# Patient Record
Sex: FEMALE | Race: WHITE | URBAN - NONMETROPOLITAN AREA
[De-identification: names, ages, dates, MRNs, and addresses within clinical notes are randomized per-mention and may not be internally consistent; named-entity substitution may affect disease eponyms.]

---

## 2024-04-11 ENCOUNTER — HOSPITAL ENCOUNTER (OUTPATIENT)
Dept: GENERAL RADIOLOGY | Age: 49
Discharge: HOME | End: 2024-04-11
Payer: COMMERCIAL

## 2024-04-11 ENCOUNTER — HOSPITAL ENCOUNTER (OUTPATIENT)
Age: 49
Discharge: HOME | End: 2024-04-11
Payer: COMMERCIAL

## 2024-04-11 DIAGNOSIS — R52 PAIN: ICD-10-CM

## 2024-04-11 PROCEDURE — 72100 X-RAY EXAM L-S SPINE 2/3 VWS: CPT

## 2024-06-30 ENCOUNTER — APPOINTMENT (OUTPATIENT)
Dept: CT IMAGING | Age: 49
DRG: 030 | End: 2024-06-30
Payer: COMMERCIAL

## 2024-06-30 ENCOUNTER — ANESTHESIA EVENT (OUTPATIENT)
Age: 49
End: 2024-06-30
Payer: COMMERCIAL

## 2024-06-30 ENCOUNTER — HOSPITAL ENCOUNTER (INPATIENT)
Age: 49
LOS: 14 days | Discharge: HOME | DRG: 030 | End: 2024-07-14
Attending: EMERGENCY MEDICINE | Admitting: INTERNAL MEDICINE
Payer: COMMERCIAL

## 2024-06-30 ENCOUNTER — APPOINTMENT (OUTPATIENT)
Age: 49
DRG: 030 | End: 2024-06-30
Attending: PSYCHIATRY & NEUROLOGY
Payer: COMMERCIAL

## 2024-06-30 ENCOUNTER — ANESTHESIA (OUTPATIENT)
Age: 49
End: 2024-06-30
Payer: COMMERCIAL

## 2024-06-30 DIAGNOSIS — I60.9 SAH (SUBARACHNOID HEMORRHAGE) (HCC): Primary | ICD-10-CM

## 2024-06-30 LAB
ABO: NORMAL
ALBUMIN SERPL BCG-MCNC: 4.1 G/DL (ref 3.5–5.1)
ALP SERPL-CCNC: 102 U/L (ref 38–126)
ALT SERPL W/O P-5'-P-CCNC: 15 U/L (ref 11–66)
AMPHETAMINES UR QL SCN: NEGATIVE
ANION GAP SERPL CALC-SCNC: 15 MEQ/L (ref 8–16)
ANTIBODY SCREEN: NORMAL
APTT PPP: 27.6 SECONDS (ref 22–38)
AST SERPL-CCNC: 24 U/L (ref 5–40)
B-HCG SERPL QL: NEGATIVE
BACTERIA URNS QL MICRO: ABNORMAL /HPF
BARBITURATES UR QL SCN: NEGATIVE
BASOPHILS ABSOLUTE: 0.1 THOU/MM3 (ref 0–0.1)
BASOPHILS NFR BLD AUTO: 0.4 %
BENZODIAZ UR QL SCN: NEGATIVE
BILIRUB SERPL-MCNC: 0.2 MG/DL (ref 0.3–1.2)
BILIRUB UR QL STRIP.AUTO: NEGATIVE
BUN SERPL-MCNC: 13 MG/DL (ref 7–22)
BZE UR QL SCN: NEGATIVE
CALCIUM SERPL-MCNC: 9.4 MG/DL (ref 8.5–10.5)
CANNABINOIDS UR QL SCN: NEGATIVE
CASTS #/AREA URNS LPF: ABNORMAL /LPF
CASTS 2: ABNORMAL /LPF
CHARACTER UR: CLEAR
CHLORIDE SERPL-SCNC: 103 MEQ/L (ref 98–111)
CO2 SERPL-SCNC: 19 MEQ/L (ref 23–33)
COLOR, UA: YELLOW
CREAT SERPL-MCNC: 1.2 MG/DL (ref 0.4–1.2)
CRYSTALS URNS MICRO: ABNORMAL
DEPRECATED RDW RBC AUTO: 43.9 FL (ref 35–45)
ECHO BSA: 1.84 M2
EOSINOPHIL NFR BLD AUTO: 0.5 %
EOSINOPHILS ABSOLUTE: 0.1 THOU/MM3 (ref 0–0.4)
EPITHELIAL CELLS, UA: ABNORMAL /HPF
ERYTHROCYTE [DISTWIDTH] IN BLOOD BY AUTOMATED COUNT: 14.2 % (ref 11.5–14.5)
FENTANYL: POSITIVE
GFR SERPL CREATININE-BSD FRML MDRD: 55 ML/MIN/1.73M2
GLUCOSE SERPL-MCNC: 179 MG/DL (ref 70–108)
GLUCOSE UR QL STRIP.AUTO: NEGATIVE MG/DL
HCT VFR BLD AUTO: 44 % (ref 37–47)
HGB BLD-MCNC: 14.5 GM/DL (ref 12–16)
HGB UR QL STRIP.AUTO: ABNORMAL
IMM GRANULOCYTES # BLD AUTO: 0.2 THOU/MM3 (ref 0–0.07)
IMM GRANULOCYTES NFR BLD AUTO: 0.9 %
INR PPP: 1.16 (ref 0.85–1.13)
KETONES UR QL STRIP.AUTO: NEGATIVE
LYMPHOCYTES ABSOLUTE: 2 THOU/MM3 (ref 1–4.8)
LYMPHOCYTES NFR BLD AUTO: 9.4 %
MCH RBC QN AUTO: 28.1 PG (ref 26–33)
MCHC RBC AUTO-ENTMCNC: 33 GM/DL (ref 32.2–35.5)
MCV RBC AUTO: 85.3 FL (ref 81–99)
MISCELLANEOUS 2: ABNORMAL
MONOCYTES ABSOLUTE: 0.8 THOU/MM3 (ref 0.4–1.3)
MONOCYTES NFR BLD AUTO: 3.8 %
NEUTROPHILS ABSOLUTE: 18.3 THOU/MM3 (ref 1.8–7.7)
NEUTROPHILS NFR BLD AUTO: 85 %
NITRITE UR QL STRIP: NEGATIVE
NRBC BLD AUTO-RTO: 0 /100 WBC
OPIATES UR QL SCN: NEGATIVE
OSMOLALITY SERPL CALC.SUM OF ELEC: 278.4 MOSMOL/KG (ref 275–300)
OXYCODONE: NEGATIVE
PCP UR QL SCN: NEGATIVE
PH UR STRIP.AUTO: 7.5 [PH] (ref 5–9)
PLATELET # BLD AUTO: 331 THOU/MM3 (ref 130–400)
PMV BLD AUTO: 9.6 FL (ref 9.4–12.4)
POTASSIUM SERPL-SCNC: 3.9 MEQ/L (ref 3.5–5.2)
PROT SERPL-MCNC: 7 G/DL (ref 6.1–8)
PROT UR STRIP.AUTO-MCNC: NEGATIVE MG/DL
RBC # BLD AUTO: 5.16 MILL/MM3 (ref 4.2–5.4)
RBC URINE: ABNORMAL /HPF
RENAL EPI CELLS #/AREA URNS HPF: ABNORMAL /[HPF]
RH FACTOR: NORMAL
SODIUM SERPL-SCNC: 137 MEQ/L (ref 135–145)
SP GR UR REFRACT.AUTO: > 1.03 (ref 1–1.03)
UROBILINOGEN, URINE: 0.2 EU/DL (ref 0–1)
WBC # BLD AUTO: 21.5 THOU/MM3 (ref 4.8–10.8)
WBC #/AREA URNS HPF: ABNORMAL /HPF
WBC #/AREA URNS HPF: ABNORMAL /[HPF]
YEAST LIKE FUNGI URNS QL MICRO: ABNORMAL

## 2024-06-30 PROCEDURE — B3131ZZ FLUOROSCOPY OF RIGHT COMMON CAROTID ARTERY USING LOW OSMOLAR CONTRAST: ICD-10-PCS | Performed by: PSYCHIATRY & NEUROLOGY

## 2024-06-30 PROCEDURE — 86900 BLOOD TYPING SEROLOGIC ABO: CPT

## 2024-06-30 PROCEDURE — 6360000002 HC RX W HCPCS: Performed by: NURSE ANESTHETIST, CERTIFIED REGISTERED

## 2024-06-30 PROCEDURE — 86901 BLOOD TYPING SEROLOGIC RH(D): CPT

## 2024-06-30 PROCEDURE — 2580000003 HC RX 258

## 2024-06-30 PROCEDURE — 80053 COMPREHEN METABOLIC PANEL: CPT

## 2024-06-30 PROCEDURE — 36415 COLL VENOUS BLD VENIPUNCTURE: CPT

## 2024-06-30 PROCEDURE — 2500000003 HC RX 250 WO HCPCS

## 2024-06-30 PROCEDURE — 2500000003 HC RX 250 WO HCPCS: Performed by: NURSE ANESTHETIST, CERTIFIED REGISTERED

## 2024-06-30 PROCEDURE — 2580000003 HC RX 258: Performed by: NURSE ANESTHETIST, CERTIFIED REGISTERED

## 2024-06-30 PROCEDURE — 6360000002 HC RX W HCPCS: Performed by: PSYCHIATRY & NEUROLOGY

## 2024-06-30 PROCEDURE — 70496 CT ANGIOGRAPHY HEAD: CPT

## 2024-06-30 PROCEDURE — 6360000002 HC RX W HCPCS

## 2024-06-30 PROCEDURE — 86850 RBC ANTIBODY SCREEN: CPT

## 2024-06-30 PROCEDURE — 61624 TCAT PERM OCCLS/EMBOLJ CNS: CPT

## 2024-06-30 PROCEDURE — 75894 X-RAYS TRANSCATH THERAPY: CPT

## 2024-06-30 PROCEDURE — 85610 PROTHROMBIN TIME: CPT

## 2024-06-30 PROCEDURE — 99285 EMERGENCY DEPT VISIT HI MDM: CPT

## 2024-06-30 PROCEDURE — C1887 CATHETER, GUIDING: HCPCS

## 2024-06-30 PROCEDURE — 81001 URINALYSIS AUTO W/SCOPE: CPT

## 2024-06-30 PROCEDURE — 3700000000 HC ANESTHESIA ATTENDED CARE

## 2024-06-30 PROCEDURE — 3700000001 HC ADD 15 MINUTES (ANESTHESIA)

## 2024-06-30 PROCEDURE — 6370000000 HC RX 637 (ALT 250 FOR IP)

## 2024-06-30 PROCEDURE — 6360000002 HC RX W HCPCS: Performed by: STUDENT IN AN ORGANIZED HEALTH CARE EDUCATION/TRAINING PROGRAM

## 2024-06-30 PROCEDURE — 6370000000 HC RX 637 (ALT 250 FOR IP): Performed by: STUDENT IN AN ORGANIZED HEALTH CARE EDUCATION/TRAINING PROGRAM

## 2024-06-30 PROCEDURE — B3161ZZ FLUOROSCOPY OF RIGHT INTERNAL CAROTID ARTERY USING LOW OSMOLAR CONTRAST: ICD-10-PCS | Performed by: PSYCHIATRY & NEUROLOGY

## 2024-06-30 PROCEDURE — 70498 CT ANGIOGRAPHY NECK: CPT

## 2024-06-30 PROCEDURE — 36224 PLACE CATH CAROTD ART: CPT

## 2024-06-30 PROCEDURE — 80307 DRUG TEST PRSMV CHEM ANLYZR: CPT

## 2024-06-30 PROCEDURE — 2000000000 HC ICU R&B

## 2024-06-30 PROCEDURE — C1760 CLOSURE DEV, VASC: HCPCS

## 2024-06-30 PROCEDURE — 85025 COMPLETE CBC W/AUTO DIFF WBC: CPT

## 2024-06-30 PROCEDURE — C1769 GUIDE WIRE: HCPCS

## 2024-06-30 PROCEDURE — 70450 CT HEAD/BRAIN W/O DYE: CPT

## 2024-06-30 PROCEDURE — 6360000004 HC RX CONTRAST MEDICATION: Performed by: EMERGENCY MEDICINE

## 2024-06-30 PROCEDURE — 36228 PLACE CATH INTRACRANIAL ART: CPT

## 2024-06-30 PROCEDURE — 36221 PLACE CATH THORACIC AORTA: CPT

## 2024-06-30 PROCEDURE — 84703 CHORIONIC GONADOTROPIN ASSAY: CPT

## 2024-06-30 PROCEDURE — 96375 TX/PRO/DX INJ NEW DRUG ADDON: CPT

## 2024-06-30 PROCEDURE — 03LG3DZ OCCLUSION OF INTRACRANIAL ARTERY WITH INTRALUMINAL DEVICE, PERCUTANEOUS APPROACH: ICD-10-PCS | Performed by: PSYCHIATRY & NEUROLOGY

## 2024-06-30 PROCEDURE — 2580000003 HC RX 258: Performed by: PSYCHIATRY & NEUROLOGY

## 2024-06-30 PROCEDURE — 85730 THROMBOPLASTIN TIME PARTIAL: CPT

## 2024-06-30 PROCEDURE — 96374 THER/PROPH/DIAG INJ IV PUSH: CPT

## 2024-06-30 PROCEDURE — 6360000004 HC RX CONTRAST MEDICATION: Performed by: PSYCHIATRY & NEUROLOGY

## 2024-06-30 PROCEDURE — 72125 CT NECK SPINE W/O DYE: CPT

## 2024-06-30 PROCEDURE — C1889 IMPLANT/INSERT DEVICE, NOC: HCPCS

## 2024-06-30 PROCEDURE — 99291 CRITICAL CARE FIRST HOUR: CPT | Performed by: NURSE PRACTITIONER

## 2024-06-30 RX ORDER — ONDANSETRON 2 MG/ML
INJECTION INTRAMUSCULAR; INTRAVENOUS PRN
Status: DISCONTINUED | OUTPATIENT
Start: 2024-06-30 | End: 2024-06-30

## 2024-06-30 RX ORDER — PROCHLORPERAZINE EDISYLATE 5 MG/ML
10 INJECTION INTRAMUSCULAR; INTRAVENOUS ONCE
Status: COMPLETED | OUTPATIENT
Start: 2024-07-01 | End: 2024-06-30

## 2024-06-30 RX ORDER — ONDANSETRON 2 MG/ML
4 INJECTION INTRAMUSCULAR; INTRAVENOUS EVERY 6 HOURS PRN
Status: DISCONTINUED | OUTPATIENT
Start: 2024-06-30 | End: 2024-07-14 | Stop reason: HOSPADM

## 2024-06-30 RX ORDER — SODIUM CHLORIDE 0.9 % (FLUSH) 0.9 %
5-40 SYRINGE (ML) INJECTION EVERY 12 HOURS SCHEDULED
Status: DISCONTINUED | OUTPATIENT
Start: 2024-06-30 | End: 2024-07-14 | Stop reason: HOSPADM

## 2024-06-30 RX ORDER — EPHEDRINE SULFATE/0.9% NACL/PF 25 MG/5 ML
SYRINGE (ML) INTRAVENOUS PRN
Status: DISCONTINUED | OUTPATIENT
Start: 2024-06-30 | End: 2024-06-30 | Stop reason: SDUPTHER

## 2024-06-30 RX ORDER — DEXAMETHASONE SODIUM PHOSPHATE 10 MG/ML
INJECTION, EMULSION INTRAMUSCULAR; INTRAVENOUS PRN
Status: DISCONTINUED | OUTPATIENT
Start: 2024-06-30 | End: 2024-06-30 | Stop reason: SDUPTHER

## 2024-06-30 RX ORDER — SODIUM CHLORIDE 0.9 % (FLUSH) 0.9 %
5-40 SYRINGE (ML) INJECTION PRN
Status: DISCONTINUED | OUTPATIENT
Start: 2024-06-30 | End: 2024-07-03 | Stop reason: SDUPTHER

## 2024-06-30 RX ORDER — FENTANYL CITRATE 50 UG/ML
50 INJECTION, SOLUTION INTRAMUSCULAR; INTRAVENOUS ONCE
Status: COMPLETED | OUTPATIENT
Start: 2024-06-30 | End: 2024-06-30

## 2024-06-30 RX ORDER — PROCHLORPERAZINE EDISYLATE 5 MG/ML
10 INJECTION INTRAMUSCULAR; INTRAVENOUS EVERY 6 HOURS PRN
Status: DISCONTINUED | OUTPATIENT
Start: 2024-06-30 | End: 2024-06-30

## 2024-06-30 RX ORDER — LIDOCAINE HYDROCHLORIDE 20 MG/ML
INJECTION, SOLUTION EPIDURAL; INFILTRATION; INTRACAUDAL; PERINEURAL PRN
Status: DISCONTINUED | OUTPATIENT
Start: 2024-06-30 | End: 2024-06-30 | Stop reason: SDUPTHER

## 2024-06-30 RX ORDER — SODIUM CHLORIDE 0.9 % (FLUSH) 0.9 %
5-40 SYRINGE (ML) INJECTION EVERY 12 HOURS SCHEDULED
Status: DISCONTINUED | OUTPATIENT
Start: 2024-06-30 | End: 2024-07-03 | Stop reason: SDUPTHER

## 2024-06-30 RX ORDER — MORPHINE SULFATE 2 MG/ML
2 INJECTION, SOLUTION INTRAMUSCULAR; INTRAVENOUS ONCE
Status: COMPLETED | OUTPATIENT
Start: 2024-06-30 | End: 2024-06-30

## 2024-06-30 RX ORDER — SODIUM CHLORIDE 9 MG/ML
INJECTION, SOLUTION INTRAVENOUS CONTINUOUS PRN
Status: DISCONTINUED | OUTPATIENT
Start: 2024-06-30 | End: 2024-06-30

## 2024-06-30 RX ORDER — PROPOFOL 10 MG/ML
INJECTION, EMULSION INTRAVENOUS PRN
Status: DISCONTINUED | OUTPATIENT
Start: 2024-06-30 | End: 2024-06-30 | Stop reason: SDUPTHER

## 2024-06-30 RX ORDER — ONDANSETRON 2 MG/ML
4 INJECTION INTRAMUSCULAR; INTRAVENOUS ONCE
Status: COMPLETED | OUTPATIENT
Start: 2024-06-30 | End: 2024-06-30

## 2024-06-30 RX ORDER — METHADONE HYDROCHLORIDE 10 MG/1
60 TABLET ORAL DAILY
Status: ON HOLD | COMMUNITY
End: 2024-07-02

## 2024-06-30 RX ORDER — ACETAMINOPHEN 325 MG/1
650 TABLET ORAL EVERY 4 HOURS PRN
Status: DISCONTINUED | OUTPATIENT
Start: 2024-06-30 | End: 2024-07-14 | Stop reason: HOSPADM

## 2024-06-30 RX ORDER — 0.9 % SODIUM CHLORIDE 0.9 %
1000 INTRAVENOUS SOLUTION INTRAVENOUS ONCE
Status: COMPLETED | OUTPATIENT
Start: 2024-06-30 | End: 2024-06-30

## 2024-06-30 RX ORDER — SODIUM CHLORIDE 0.9 % (FLUSH) 0.9 %
5-40 SYRINGE (ML) INJECTION PRN
Status: DISCONTINUED | OUTPATIENT
Start: 2024-06-30 | End: 2024-07-14 | Stop reason: HOSPADM

## 2024-06-30 RX ORDER — TRAMADOL HYDROCHLORIDE 50 MG/1
50 TABLET ORAL ONCE
Status: COMPLETED | OUTPATIENT
Start: 2024-06-30 | End: 2024-06-30

## 2024-06-30 RX ORDER — DIPHENHYDRAMINE HYDROCHLORIDE 50 MG/ML
25 INJECTION INTRAMUSCULAR; INTRAVENOUS ONCE
Status: COMPLETED | OUTPATIENT
Start: 2024-06-30 | End: 2024-06-30

## 2024-06-30 RX ORDER — METHADONE HYDROCHLORIDE 5 MG/5ML
10 SOLUTION ORAL ONCE
Status: COMPLETED | OUTPATIENT
Start: 2024-06-30 | End: 2024-06-30

## 2024-06-30 RX ORDER — FENTANYL CITRATE 50 UG/ML
INJECTION, SOLUTION INTRAMUSCULAR; INTRAVENOUS PRN
Status: DISCONTINUED | OUTPATIENT
Start: 2024-06-30 | End: 2024-06-30 | Stop reason: SDUPTHER

## 2024-06-30 RX ORDER — PROCHLORPERAZINE EDISYLATE 5 MG/ML
10 INJECTION INTRAMUSCULAR; INTRAVENOUS ONCE
Status: COMPLETED | OUTPATIENT
Start: 2024-06-30 | End: 2024-06-30

## 2024-06-30 RX ORDER — SODIUM CHLORIDE 9 MG/ML
INJECTION, SOLUTION INTRAVENOUS PRN
Status: DISCONTINUED | OUTPATIENT
Start: 2024-06-30 | End: 2024-07-14

## 2024-06-30 RX ORDER — GABAPENTIN 300 MG/1
300 CAPSULE ORAL 3 TIMES DAILY
Status: DISCONTINUED | OUTPATIENT
Start: 2024-07-01 | End: 2024-07-01

## 2024-06-30 RX ORDER — SUCCINYLCHOLINE/SOD CL,ISO/PF 200MG/10ML
SYRINGE (ML) INTRAVENOUS PRN
Status: DISCONTINUED | OUTPATIENT
Start: 2024-06-30 | End: 2024-06-30 | Stop reason: SDUPTHER

## 2024-06-30 RX ORDER — TRANEXAMIC ACID 10 MG/ML
1000 INJECTION, SOLUTION INTRAVENOUS ONCE
Status: COMPLETED | OUTPATIENT
Start: 2024-06-30 | End: 2024-06-30

## 2024-06-30 RX ADMIN — LIDOCAINE HYDROCHLORIDE 100 MG: 20 INJECTION, SOLUTION EPIDURAL; INFILTRATION; INTRACAUDAL; PERINEURAL at 18:29

## 2024-06-30 RX ADMIN — PHENYLEPHRINE HYDROCHLORIDE 200 MCG: 10 INJECTION INTRAVENOUS at 19:04

## 2024-06-30 RX ADMIN — SODIUM CHLORIDE 7.5 MG/HR: 9 INJECTION, SOLUTION INTRAVENOUS at 22:53

## 2024-06-30 RX ADMIN — TRAMADOL HYDROCHLORIDE 50 MG: 50 TABLET ORAL at 22:38

## 2024-06-30 RX ADMIN — LEVETIRACETAM 500 MG: 100 INJECTION INTRAVENOUS at 18:10

## 2024-06-30 RX ADMIN — DEXAMETHASONE SODIUM PHOSPHATE 10 MG: 10 INJECTION, EMULSION INTRAMUSCULAR; INTRAVENOUS at 19:14

## 2024-06-30 RX ADMIN — EPHEDRINE SULFATE 5 MG: 5 INJECTION INTRAVENOUS at 19:06

## 2024-06-30 RX ADMIN — PHENYLEPHRINE HYDROCHLORIDE 200 MCG: 10 INJECTION INTRAVENOUS at 18:53

## 2024-06-30 RX ADMIN — FENTANYL CITRATE 50 MCG: 50 INJECTION INTRAMUSCULAR; INTRAVENOUS at 15:46

## 2024-06-30 RX ADMIN — PROPOFOL 150 MG: 10 INJECTION, EMULSION INTRAVENOUS at 18:29

## 2024-06-30 RX ADMIN — ONDANSETRON 4 MG: 2 INJECTION INTRAMUSCULAR; INTRAVENOUS at 22:19

## 2024-06-30 RX ADMIN — PHENYLEPHRINE HYDROCHLORIDE 200 MCG: 10 INJECTION INTRAVENOUS at 19:00

## 2024-06-30 RX ADMIN — SODIUM CHLORIDE 1000 ML: 9 INJECTION, SOLUTION INTRAVENOUS at 14:07

## 2024-06-30 RX ADMIN — PROCHLORPERAZINE EDISYLATE 10 MG: 5 INJECTION INTRAMUSCULAR; INTRAVENOUS at 23:42

## 2024-06-30 RX ADMIN — IOPAMIDOL 80 ML: 755 INJECTION, SOLUTION INTRAVENOUS at 17:20

## 2024-06-30 RX ADMIN — Medication 160 MG: at 18:29

## 2024-06-30 RX ADMIN — PHENYLEPHRINE HYDROCHLORIDE 200 MCG: 10 INJECTION INTRAVENOUS at 18:50

## 2024-06-30 RX ADMIN — PHENYLEPHRINE HYDROCHLORIDE 200 MCG: 10 INJECTION INTRAVENOUS at 19:07

## 2024-06-30 RX ADMIN — METHADONE HYDROCHLORIDE 10 MG: 5 SOLUTION ORAL at 15:49

## 2024-06-30 RX ADMIN — PHENYLEPHRINE HYDROCHLORIDE 200 MCG: 10 INJECTION INTRAVENOUS at 18:57

## 2024-06-30 RX ADMIN — DIPHENHYDRAMINE HYDROCHLORIDE 25 MG: 50 INJECTION INTRAMUSCULAR; INTRAVENOUS at 14:05

## 2024-06-30 RX ADMIN — PROPOFOL 50 MG: 10 INJECTION, EMULSION INTRAVENOUS at 18:35

## 2024-06-30 RX ADMIN — SODIUM CHLORIDE 5 MG/HR: 9 INJECTION, SOLUTION INTRAVENOUS at 18:20

## 2024-06-30 RX ADMIN — FENTANYL CITRATE 100 MCG: 50 INJECTION, SOLUTION INTRAMUSCULAR; INTRAVENOUS at 18:33

## 2024-06-30 RX ADMIN — METHYLPREDNISOLONE SODIUM SUCCINATE 250 MG: 500 INJECTION INTRAMUSCULAR; INTRAVENOUS at 23:24

## 2024-06-30 RX ADMIN — ONDANSETRON 4 MG: 2 INJECTION INTRAMUSCULAR; INTRAVENOUS at 14:05

## 2024-06-30 RX ADMIN — MORPHINE SULFATE 2 MG: 2 INJECTION, SOLUTION INTRAMUSCULAR; INTRAVENOUS at 20:37

## 2024-06-30 RX ADMIN — PROCHLORPERAZINE EDISYLATE 10 MG: 5 INJECTION INTRAMUSCULAR; INTRAVENOUS at 14:05

## 2024-06-30 RX ADMIN — TRANEXAMIC ACID 1000 MG: 10 INJECTION, SOLUTION INTRAVENOUS at 17:44

## 2024-06-30 RX ADMIN — EPHEDRINE SULFATE 10 MG: 5 INJECTION INTRAVENOUS at 18:58

## 2024-06-30 RX ADMIN — MORPHINE SULFATE 2 MG: 2 INJECTION, SOLUTION INTRAMUSCULAR; INTRAVENOUS at 21:52

## 2024-06-30 RX ADMIN — ONDANSETRON 4 MG: 2 INJECTION INTRAMUSCULAR; INTRAVENOUS at 19:14

## 2024-06-30 RX ADMIN — EPHEDRINE SULFATE 10 MG: 5 INJECTION INTRAVENOUS at 19:03

## 2024-06-30 RX ADMIN — SODIUM CHLORIDE: 9 INJECTION, SOLUTION INTRAVENOUS at 18:24

## 2024-06-30 RX ADMIN — IOPAMIDOL 55 ML: 612 INJECTION, SOLUTION INTRAVENOUS at 19:27

## 2024-06-30 ASSESSMENT — PAIN DESCRIPTION - ORIENTATION
ORIENTATION: MID
ORIENTATION: MID

## 2024-06-30 ASSESSMENT — PAIN DESCRIPTION - LOCATION
LOCATION: HEAD
LOCATION: BACK
LOCATION: HEAD

## 2024-06-30 ASSESSMENT — PAIN SCALES - GENERAL
PAINLEVEL_OUTOF10: 9
PAINLEVEL_OUTOF10: 10
PAINLEVEL_OUTOF10: 9
PAINLEVEL_OUTOF10: 4
PAINLEVEL_OUTOF10: 7
PAINLEVEL_OUTOF10: 8

## 2024-06-30 ASSESSMENT — PAIN DESCRIPTION - PAIN TYPE
TYPE: ACUTE PAIN
TYPE: ACUTE PAIN

## 2024-06-30 ASSESSMENT — PAIN - FUNCTIONAL ASSESSMENT
PAIN_FUNCTIONAL_ASSESSMENT: PREVENTS OR INTERFERES SOME ACTIVE ACTIVITIES AND ADLS
PAIN_FUNCTIONAL_ASSESSMENT: 0-10
PAIN_FUNCTIONAL_ASSESSMENT: 0-10
PAIN_FUNCTIONAL_ASSESSMENT: PREVENTS OR INTERFERES SOME ACTIVE ACTIVITIES AND ADLS

## 2024-06-30 ASSESSMENT — PAIN DESCRIPTION - ONSET
ONSET: ON-GOING
ONSET: ON-GOING

## 2024-06-30 ASSESSMENT — PAIN DESCRIPTION - FREQUENCY
FREQUENCY: CONTINUOUS
FREQUENCY: CONTINUOUS

## 2024-06-30 ASSESSMENT — PAIN DESCRIPTION - DESCRIPTORS
DESCRIPTORS: SHARP
DESCRIPTORS: SHARP

## 2024-06-30 NOTE — H&P
Interventional Neurology Pre-procedure H&P Note    Date:6/30/2024       Room:[address] [place] [address]  Patient Name:Marika Peters     YOB: 1975     Age:49 y.o.    Performing Physician: Dr. Nye  Procedure: Diagnostic cerebral angiogram with coiling of ruptured ACOM aneurysm  Reason for Procedure: Ruptured ACOM aneurysm      Chief Complaint:   Chief Complaint   Patient presents with    Emesis    Headache       Subjective     Marika Peters is a 49 y.o. female with past medical history of cervical cancer, substance abuse on methadone, hypertension who presents to [facility] on 6/30/2024 with complaints of nausea, vomiting, and severe headache.  Around 11 AM this morning she took her methadone and 400 mg of caffeine pills as she was feeling tired.  Shortly thereafter she began to develop a severe headache and vomit.  Patient's family reports that she also began to clench her hands and would not respond to them.  She apparently had refused her imaging multiple times.  At 1705 she agreed to do her CT of her head and this revealed moderately extensive subarachnoid hemorrhage.  She was stroke alerted at 1718.  CTA head and neck completed and revealed ruptured ACOM aneurysm.  [facility] alert activated at 1733.  Procedure explained with patient and her daughter.  Risks(kidney toxicity, dissection, infection, hemorrhage, hematoma, allergic reaction, death, stroke) and benefits explained to patient and her daughter and they deny any further questions.  Her daughter Viola Forbes signed consent and it was placed in her heart rate.  She did not know of any history of aneurysms.  She does have an aunt that about 1 year ago underwent coiling for a ruptured aneurysm.  Patient is a current cigarette smoker and smokes about 1 pack/day.    Review of Systems   Review of Systems   Unable to perform ROS: Acuity of condition (Stroke alert)   Gastrointestinal:  Positive for nausea and vomiting.   Neurological:  
COMPARISON: No prior study. TECHNIQUE: Multiple axial 3 mm images of the entire cervical spine were obtained without the administration of intravenous contrast material.. Computer generated sagittal images of the cervical spine were reconstructed. ALL CT SCANS AT THIS FACILITY use dose modulation, iterative reconstruction, and/or weight-based dosing when appropriate to reduce radiation dose to as low as reasonably achievable. FINDINGS: Study somewhat limited by motion artifact. Mild degenerative spurring is scattered in the cervical spine. No acute fracture seen. . The bony spinal canal well-maintained. The facet joints are normally aligned. The prevertebral soft tissues are unremarkable.     No acute findings. **This report has been created using voice recognition software.  It may contain minor errors which are inherent in voice recognition technology.** Electronically signed by Dr. Jose G Rae    CT Head W/O Contrast    Result Date: 6/30/2024  PROCEDURE: NONCONTRAST CT BRAIN CLINICAL INFORMATION: headache, vomiting COMPARISON: No prior study TECHNIQUE: Multiple axial 5 mm images of the brain were obtained without administration of intravenous contrast material. ALL CT SCANS AT THIS FACILITY use dose modulation, iterative reconstruction, and/or weight-based dosing when appropriate to reduce radiation dose to as low as reasonably achievable. FINDINGS: VENTRICLES: Normal in size, contour, position... PARENCHYMA: There is subarachnoid hemorrhage in both sylvian fissures as well as in the midline superiorly. No acute infarct or mass lesion is seen. MASTOID PROCESSES: Well aerated. Normal in appearance..  PARANASAL SINUSES/CALVARIUM: Unremarkable     Moderately extensive subarachnoid hemorrhage.. **This report has been created using voice recognition software.  It may contain minor errors which are inherent in voice recognition technology.** Electronically signed by Dr. Jose G Rae      CONSULTS:-  [] Cardiology

## 2024-06-30 NOTE — ED PROVIDER NOTES
[facility] EMERGENCY DEPT  EMERGENCY DEPARTMENT ENCOUNTER          Pt Name: Marika Peters  MRN: [id number]  Birthdate 1975  Date of evaluation: 6/30/2024  Physician: dA Luna MD  Supervising Attending Physician:  Dr. Barahona       CHIEF COMPLAINT       Chief Complaint   Patient presents with    Emesis    Headache         HISTORY OF PRESENT ILLNESS    HPI  Marika Peters is a 49 y.o. female who presents to the emergency department from home, brought in by EMS for evaluation of vomiting and headache.  Patient reports that around 11:00 she took her methadone and then some caffeine pills.  Patient reports that she took 2 200 mg caffeine pills because she was feeling tired.  Patient reports that 20 minutes afterwards she began to have a headache and began to vomit.  Patient's family at bedside states that she also began to clench her hands and would not respond.  Mom was concerned that he called for EMS.  On arrival, patient states that she has a headache all over her head.  She also reports some pain in her neck.  States that the light does make the pain worse.  She reports associated nausea with vomiting.  Denies any recent trauma.  She denies any chest pain or shortness of breath.  Denies any abdominal pain.  Denies any numbness or tingling in her extremities.  She denies any weakness.  The patient has no other acute complaints at this time.            PAST MEDICAL AND SURGICAL HISTORY   No past medical history on file.  No past surgical history on file.      MEDICATIONS     Current Facility-Administered Medications:     ondansetron (ZOFRAN) injection 4 mg, 4 mg, IntraVENous, Once, Ad Luna MD    sodium chloride 0.9 % bolus 1,000 mL, 1,000 mL, IntraVENous, Once, Ad Luna MD    prochlorperazine (COMPAZINE) injection 10 mg, 10 mg, IntraVENous, Once, Ad Luna MD    diphenhydrAMINE (BENADRYL) injection 25 mg, 25 mg, IntraVENous, Once, Jeremy

## 2024-06-30 NOTE — ED TRIAGE NOTES
Pt presents to ED with complaints of emesis and headache. EMS reports that pt took her methadone this morning around 1030 and took 2 200mg caffeine pills today due to \"not having any energy\" and had a headache and vomiting about 20-30 minutes after. Pt denies chest pain or shortness of breath, HR within normal limits (65 bmp). EMS gave 4 mg zofran IV in route.

## 2024-06-30 NOTE — PROCEDURES
BRIEF POST-OPERATIVE PROCEDURE NOTE  (Full Note Pending)    Date of Procedure: 6/30/24      Surgeon(s): Popeye.    Pre-operative Diagnosis:  SAH    Post-operative Diagnosis:  Ruptured A. COm aneurysm. S/P coiling.    Procedure(s):  Aneurysm coiling through right femoral artery.     Anesthesia:  GA    Additional Procedure Details:  asa above.     Brief Findings:  asa above    Estimated Blood Loss:  < 10  ML    Specimens:  NA    Complications:  None.    Total Operative Time:  per anesthesia.     Disposition: ICU - extubated and stable.           Condition: Critical.    Surgeon:   Denise Nye MD;   Interventional Neurology, Stroke, Neuro Critical Care, Neurology  [facility]

## 2024-06-30 NOTE — PROCEDURES
DATE OF PROCEDURE: 6/30/24.    PREOPERATIVE DIAGNOSIS: SAH.     POSTOPERATIVE DIAGNOSIS: Ruptured ACOM aneurysm. S/P coiling (Target soft 3x6).     SURGEON: Denise Nye MD.      ANESTHESIA: GA.    MEDICATIONS: 3 mg IA verapamil.     CONTRAST: 55 ml iso bernice.    FLUORO TIME: 12.5 minutes.    Estimated Blood Loss: < 10 ml.     INTERVENTIONAL PROCEDURE:  ANEURYSM COILING  1-VESSEL CEREBRAL ANGIOGRAM.   US-GUIDED ARTERIAL ACCESS    CATHETERIZATION OF THE FOLLOWING VESSEL:  RIGHT COMMON CAROTID ARTERY.  RIGHT INTERNAL CAROTID ARTERY.     ANGIOGRAPHY AND INTERPRETATION OF THE FOLLOWING IMAGES  RIGHT COMMON CAROTID ARTERY CERVICAL STANDARD AP AND LATERAL ANGIOGRAM.  RIGHT INTERNAL CAROTID ARTERY CRANIAL STANDARD AP AND LATERAL ANGIOGRAM.   RIGHT INTERNAL CAROTID ARTERY CRANIAL WORKING POSITION AP AND LATERAL ANGIOGRAM.    FOLLOW UP# 1: RIGHT INTERNAL CAROTID ARTERY CRANIAL WORKING POSITION AP AND LATERAL ANGIOGRAM AFTER CANNULATION OF THE ANEURYSM WITH MICROCATHETER TO CONFIRM PROPER POSITION FOR EMBOLIZATION.  FOLLOW UP # 2: RIGHT INTERNAL CAROTID INTERNAL CAROTID CRANIAL WORKING POSITION AP AND LATERAL ANGIOGRAM AFTER DEPLOYMENT OF COIL # 1 (TARGET SOFT 3X6) BEFORE DETACHMENT TO ASSESS ANEURYSM OCCLUSION AND ENSURE PARENT VESSEL PATENCY.  FOLLOW UP # 3: RIGHT INTERNAL CAROTID ARTERY CRANIAL WORKING POSITION AP AND LATERAL ANGIOGRAM AFTER MICROCATHETER REMOVAL TO ASSESS COIL MASS MOVEMENT AND TO ASSESS ANEURYSM NECK OCCLUSION.  FOLLOW UP # 4: RIGHT INTERNAL CAROTID ARTERY CRANIAL STANDARD AP AND LATERAL ANGIOGRAM AFTER MICROCATHETER REMOVAL TO RULE OUT THROMBOEMBOLIC COMPLICATIONS.  RIGHT FEMORAL ARE TRY OBLIQUE AP ANGIOGRAM.      INDICATION:     49-year-old lady with SAH. CTA showed anterior communicating aneurysm. She is here for diagnostic cerebral angiogram and aneurysm coiling. Consent was obtained from the patient after explaining benefits and risks. All questions were answered.      DESCRIPTION OF PROCEDURE AND

## 2024-06-30 NOTE — ED NOTES
RN called CT to find out delay on scan, pt refused CT multiple times per CT, see rad tracking notes.

## 2024-07-01 ENCOUNTER — APPOINTMENT (OUTPATIENT)
Dept: CT IMAGING | Age: 49
DRG: 030 | End: 2024-07-01
Payer: COMMERCIAL

## 2024-07-01 LAB
ANION GAP SERPL CALC-SCNC: 16 MEQ/L (ref 8–16)
BUN SERPL-MCNC: 11 MG/DL (ref 7–22)
CALCIUM SERPL-MCNC: 9.1 MG/DL (ref 8.5–10.5)
CHLORIDE SERPL-SCNC: 103 MEQ/L (ref 98–111)
CO2 SERPL-SCNC: 18 MEQ/L (ref 23–33)
CREAT SERPL-MCNC: 0.9 MG/DL (ref 0.4–1.2)
DEPRECATED RDW RBC AUTO: 44.1 FL (ref 35–45)
EKG ATRIAL RATE: 62 BPM
EKG P AXIS: 60 DEGREES
EKG P-R INTERVAL: 142 MS
EKG Q-T INTERVAL: 458 MS
EKG QRS DURATION: 90 MS
EKG QTC CALCULATION (BAZETT): 464 MS
EKG R AXIS: 15 DEGREES
EKG T AXIS: 28 DEGREES
EKG VENTRICULAR RATE: 62 BPM
ERYTHROCYTE [DISTWIDTH] IN BLOOD BY AUTOMATED COUNT: 14.4 % (ref 11.5–14.5)
GFR SERPL CREATININE-BSD FRML MDRD: 78 ML/MIN/1.73M2
GLUCOSE BLD STRIP.AUTO-MCNC: 145 MG/DL (ref 70–108)
GLUCOSE SERPL-MCNC: 162 MG/DL (ref 70–108)
HCT VFR BLD AUTO: 41.3 % (ref 37–47)
HGB BLD-MCNC: 14.2 GM/DL (ref 12–16)
MCH RBC QN AUTO: 28.9 PG (ref 26–33)
MCHC RBC AUTO-ENTMCNC: 34.4 GM/DL (ref 32.2–35.5)
MCV RBC AUTO: 83.9 FL (ref 81–99)
PLATELET # BLD AUTO: 270 THOU/MM3 (ref 130–400)
PMV BLD AUTO: 10 FL (ref 9.4–12.4)
POTASSIUM SERPL-SCNC: 4 MEQ/L (ref 3.5–5.2)
RBC # BLD AUTO: 4.92 MILL/MM3 (ref 4.2–5.4)
SODIUM SERPL-SCNC: 137 MEQ/L (ref 135–145)
WBC # BLD AUTO: 18.7 THOU/MM3 (ref 4.8–10.8)

## 2024-07-01 PROCEDURE — 92610 EVALUATE SWALLOWING FUNCTION: CPT

## 2024-07-01 PROCEDURE — 82948 REAGENT STRIP/BLOOD GLUCOSE: CPT

## 2024-07-01 PROCEDURE — 2580000003 HC RX 258: Performed by: PSYCHIATRY & NEUROLOGY

## 2024-07-01 PROCEDURE — 70450 CT HEAD/BRAIN W/O DYE: CPT

## 2024-07-01 PROCEDURE — 36415 COLL VENOUS BLD VENIPUNCTURE: CPT

## 2024-07-01 PROCEDURE — 6370000000 HC RX 637 (ALT 250 FOR IP): Performed by: STUDENT IN AN ORGANIZED HEALTH CARE EDUCATION/TRAINING PROGRAM

## 2024-07-01 PROCEDURE — 99232 SBSQ HOSP IP/OBS MODERATE 35: CPT

## 2024-07-01 PROCEDURE — 6370000000 HC RX 637 (ALT 250 FOR IP)

## 2024-07-01 PROCEDURE — 93010 ELECTROCARDIOGRAM REPORT: CPT | Performed by: INTERNAL MEDICINE

## 2024-07-01 PROCEDURE — 99291 CRITICAL CARE FIRST HOUR: CPT | Performed by: INTERNAL MEDICINE

## 2024-07-01 PROCEDURE — 6370000000 HC RX 637 (ALT 250 FOR IP): Performed by: PSYCHIATRY & NEUROLOGY

## 2024-07-01 PROCEDURE — 6360000002 HC RX W HCPCS

## 2024-07-01 PROCEDURE — 2000000000 HC ICU R&B

## 2024-07-01 PROCEDURE — 80048 BASIC METABOLIC PNL TOTAL CA: CPT

## 2024-07-01 PROCEDURE — 6360000002 HC RX W HCPCS: Performed by: STUDENT IN AN ORGANIZED HEALTH CARE EDUCATION/TRAINING PROGRAM

## 2024-07-01 PROCEDURE — 2580000003 HC RX 258

## 2024-07-01 PROCEDURE — 93005 ELECTROCARDIOGRAM TRACING: CPT | Performed by: STUDENT IN AN ORGANIZED HEALTH CARE EDUCATION/TRAINING PROGRAM

## 2024-07-01 PROCEDURE — 85027 COMPLETE CBC AUTOMATED: CPT

## 2024-07-01 RX ORDER — GABAPENTIN 300 MG/1
300 CAPSULE ORAL 2 TIMES DAILY
COMMUNITY

## 2024-07-01 RX ORDER — BUPROPION HYDROCHLORIDE 300 MG/1
300 TABLET ORAL EVERY MORNING
COMMUNITY

## 2024-07-01 RX ORDER — LEVOCETIRIZINE DIHYDROCHLORIDE 5 MG/1
1 TABLET, FILM COATED ORAL NIGHTLY
COMMUNITY

## 2024-07-01 RX ORDER — HEPARIN SODIUM 5000 [USP'U]/ML
5000 INJECTION, SOLUTION INTRAVENOUS; SUBCUTANEOUS EVERY 8 HOURS SCHEDULED
Status: DISCONTINUED | OUTPATIENT
Start: 2024-07-01 | End: 2024-07-14 | Stop reason: HOSPADM

## 2024-07-01 RX ORDER — METHYLPREDNISOLONE 4 MG/1
4 TABLET ORAL
Status: COMPLETED | OUTPATIENT
Start: 2024-07-02 | End: 2024-07-03

## 2024-07-01 RX ORDER — POLYETHYLENE GLYCOL 3350 17 G/17G
17 POWDER, FOR SOLUTION ORAL DAILY PRN
Status: DISCONTINUED | OUTPATIENT
Start: 2024-07-01 | End: 2024-07-14 | Stop reason: HOSPADM

## 2024-07-01 RX ORDER — MORPHINE SULFATE 2 MG/ML
2 INJECTION, SOLUTION INTRAMUSCULAR; INTRAVENOUS ONCE
Status: COMPLETED | OUTPATIENT
Start: 2024-07-01 | End: 2024-07-01

## 2024-07-01 RX ORDER — METHYLPREDNISOLONE 4 MG/1
4 TABLET ORAL
Status: COMPLETED | OUTPATIENT
Start: 2024-07-02 | End: 2024-07-04

## 2024-07-01 RX ORDER — HYDROXYZINE 50 MG/1
50 TABLET, FILM COATED ORAL NIGHTLY
COMMUNITY

## 2024-07-01 RX ORDER — NIMODIPINE 30 MG/1
60 CAPSULE, LIQUID FILLED ORAL
Status: DISCONTINUED | OUTPATIENT
Start: 2024-07-01 | End: 2024-07-14 | Stop reason: HOSPADM

## 2024-07-01 RX ORDER — PREGABALIN 50 MG/1
50 CAPSULE ORAL 3 TIMES DAILY
Status: DISCONTINUED | OUTPATIENT
Start: 2024-07-01 | End: 2024-07-08

## 2024-07-01 RX ORDER — FENTANYL CITRATE 50 UG/ML
50 INJECTION, SOLUTION INTRAMUSCULAR; INTRAVENOUS ONCE
Status: COMPLETED | OUTPATIENT
Start: 2024-07-01 | End: 2024-07-01

## 2024-07-01 RX ORDER — MORPHINE SULFATE 2 MG/ML
2 INJECTION, SOLUTION INTRAMUSCULAR; INTRAVENOUS
Status: DISCONTINUED | OUTPATIENT
Start: 2024-07-01 | End: 2024-07-01

## 2024-07-01 RX ORDER — METHYLPREDNISOLONE 4 MG/1
4 TABLET ORAL NIGHTLY
Status: COMPLETED | OUTPATIENT
Start: 2024-07-03 | End: 2024-07-05

## 2024-07-01 RX ORDER — SENNOSIDES A AND B 8.6 MG/1
1 TABLET, FILM COATED ORAL NIGHTLY
Status: DISCONTINUED | OUTPATIENT
Start: 2024-07-01 | End: 2024-07-05

## 2024-07-01 RX ORDER — METHYLPREDNISOLONE 4 MG/1
24 TABLET ORAL ONCE
Status: COMPLETED | OUTPATIENT
Start: 2024-07-01 | End: 2024-07-01

## 2024-07-01 RX ORDER — METHYLPREDNISOLONE 4 MG/1
4 TABLET ORAL
Status: COMPLETED | OUTPATIENT
Start: 2024-07-02 | End: 2024-07-06

## 2024-07-01 RX ORDER — ASPIRIN 81 MG/1
81 TABLET, CHEWABLE ORAL DAILY
Status: DISCONTINUED | OUTPATIENT
Start: 2024-07-01 | End: 2024-07-14 | Stop reason: HOSPADM

## 2024-07-01 RX ORDER — ONDANSETRON 2 MG/ML
4 INJECTION INTRAMUSCULAR; INTRAVENOUS EVERY 6 HOURS PRN
Status: DISCONTINUED | OUTPATIENT
Start: 2024-07-01 | End: 2024-07-01 | Stop reason: SDUPTHER

## 2024-07-01 RX ORDER — METHYLPREDNISOLONE 4 MG/1
8 TABLET ORAL NIGHTLY
Status: COMPLETED | OUTPATIENT
Start: 2024-07-02 | End: 2024-07-02

## 2024-07-01 RX ORDER — DOCUSATE SODIUM 100 MG/1
100 CAPSULE, LIQUID FILLED ORAL DAILY
Status: DISCONTINUED | OUTPATIENT
Start: 2024-07-01 | End: 2024-07-05

## 2024-07-01 RX ADMIN — SODIUM CHLORIDE 5 MG/HR: 9 INJECTION, SOLUTION INTRAVENOUS at 20:44

## 2024-07-01 RX ADMIN — NIMODIPINE 60 MG: 30 CAPSULE, LIQUID FILLED ORAL at 12:05

## 2024-07-01 RX ADMIN — NIMODIPINE 60 MG: 30 CAPSULE, LIQUID FILLED ORAL at 20:23

## 2024-07-01 RX ADMIN — SODIUM CHLORIDE, PRESERVATIVE FREE 10 ML: 5 INJECTION INTRAVENOUS at 20:33

## 2024-07-01 RX ADMIN — ACETAMINOPHEN 650 MG: 325 TABLET ORAL at 20:30

## 2024-07-01 RX ADMIN — NIMODIPINE 60 MG: 30 CAPSULE, LIQUID FILLED ORAL at 23:36

## 2024-07-01 RX ADMIN — SODIUM CHLORIDE 7.5 MG/HR: 9 INJECTION, SOLUTION INTRAVENOUS at 12:21

## 2024-07-01 RX ADMIN — DOCUSATE SODIUM 100 MG: 100 CAPSULE, LIQUID FILLED ORAL at 14:03

## 2024-07-01 RX ADMIN — SENNOSIDES 8.6 MG: 8.6 TABLET, FILM COATED ORAL at 20:23

## 2024-07-01 RX ADMIN — FENTANYL CITRATE 50 MCG: 50 INJECTION, SOLUTION INTRAMUSCULAR; INTRAVENOUS at 04:15

## 2024-07-01 RX ADMIN — ACETAMINOPHEN 650 MG: 325 TABLET ORAL at 16:20

## 2024-07-01 RX ADMIN — MORPHINE SULFATE 2 MG: 2 INJECTION, SOLUTION INTRAMUSCULAR; INTRAVENOUS at 05:20

## 2024-07-01 RX ADMIN — SODIUM CHLORIDE 5 MG/HR: 9 INJECTION, SOLUTION INTRAVENOUS at 20:31

## 2024-07-01 RX ADMIN — PREGABALIN 50 MG: 50 CAPSULE ORAL at 20:23

## 2024-07-01 RX ADMIN — ACETAMINOPHEN 650 MG: 325 TABLET ORAL at 12:19

## 2024-07-01 RX ADMIN — GABAPENTIN 300 MG: 300 CAPSULE ORAL at 08:53

## 2024-07-01 RX ADMIN — ASPIRIN 81 MG 81 MG: 81 TABLET ORAL at 12:05

## 2024-07-01 RX ADMIN — METHYLPREDNISOLONE 24 MG: 4 TABLET ORAL at 16:12

## 2024-07-01 RX ADMIN — GABAPENTIN 300 MG: 300 CAPSULE ORAL at 14:00

## 2024-07-01 RX ADMIN — HEPARIN SODIUM 5000 UNITS: 5000 INJECTION INTRAVENOUS; SUBCUTANEOUS at 20:23

## 2024-07-01 RX ADMIN — NIMODIPINE 60 MG: 30 CAPSULE, LIQUID FILLED ORAL at 16:12

## 2024-07-01 RX ADMIN — MORPHINE SULFATE 2 MG: 2 INJECTION, SOLUTION INTRAMUSCULAR; INTRAVENOUS at 09:38

## 2024-07-01 RX ADMIN — SODIUM CHLORIDE, PRESERVATIVE FREE 10 ML: 5 INJECTION INTRAVENOUS at 09:38

## 2024-07-01 ASSESSMENT — PAIN DESCRIPTION - DESCRIPTORS
DESCRIPTORS: THROBBING;SHARP
DESCRIPTORS: SHARP
DESCRIPTORS: ACHING
DESCRIPTORS: SHARP;THROBBING
DESCRIPTORS: SHARP;THROBBING

## 2024-07-01 ASSESSMENT — PAIN DESCRIPTION - ORIENTATION
ORIENTATION: MID
ORIENTATION: ANTERIOR
ORIENTATION: MID

## 2024-07-01 ASSESSMENT — PAIN DESCRIPTION - LOCATION
LOCATION: HEAD

## 2024-07-01 ASSESSMENT — PAIN SCALES - GENERAL
PAINLEVEL_OUTOF10: 5
PAINLEVEL_OUTOF10: 5
PAINLEVEL_OUTOF10: 8
PAINLEVEL_OUTOF10: 9
PAINLEVEL_OUTOF10: 8
PAINLEVEL_OUTOF10: 5
PAINLEVEL_OUTOF10: 7
PAINLEVEL_OUTOF10: 6
PAINLEVEL_OUTOF10: 6
PAINLEVEL_OUTOF10: 7

## 2024-07-01 ASSESSMENT — PAIN DESCRIPTION - ONSET
ONSET: AWAKENED FROM SLEEP
ONSET: ON-GOING
ONSET: ON-GOING

## 2024-07-01 ASSESSMENT — PAIN - FUNCTIONAL ASSESSMENT
PAIN_FUNCTIONAL_ASSESSMENT: PREVENTS OR INTERFERES SOME ACTIVE ACTIVITIES AND ADLS
PAIN_FUNCTIONAL_ASSESSMENT: ACTIVITIES ARE NOT PREVENTED
PAIN_FUNCTIONAL_ASSESSMENT: PREVENTS OR INTERFERES SOME ACTIVE ACTIVITIES AND ADLS

## 2024-07-01 ASSESSMENT — PAIN DESCRIPTION - PAIN TYPE
TYPE: ACUTE PAIN

## 2024-07-01 ASSESSMENT — PAIN DESCRIPTION - FREQUENCY
FREQUENCY: CONTINUOUS

## 2024-07-01 NOTE — FLOWSHEET NOTE
Left message with the office of Kathy Buenrostro CNP at [phone] regarding medical records/ last visit summary and medication list.     Pt stated she had been seen at [facility] in [place]. Through \"care everywhere\", unable to find records of her being a patient there.     Talked with admissions nurse, Marilee. Pt unable to assist with data collection at this time. Marilee states she will reach out to pt's daughter, Viola.

## 2024-07-01 NOTE — FLOWSHEET NOTE
Fax received from [facility] from 4/10/2024 office visit with Kathy Buenrostor. Medication list included.    Medication list updated from these records.

## 2024-07-01 NOTE — CARE COORDINATION
Case Management Assessment Initial Evaluation    Date/Time of Evaluation: 2024 9:18 AM  Assessment Completed by: Katarzyna Valdivia RN    If patient is discharged prior to next notation, then this note serves as note for discharge by case management.    Patient Name: Marika Peters                   YOB: 1975  Diagnosis: SAH (subarachnoid hemorrhage) (HCC) [I60.9]  Subarachnoid bleed (HCC) [I60.9]                   Date / Time: 2024 12:06 PM  Location: [facility]     Patient Admission Status: Inpatient   Readmission Risk Low 0-14, Mod 15-19), High > 20: Readmission Risk Score: 10.1    Current PCP: Unknown, Provider, APRN - NP    Additional Case Management Notes: Presented to ED with c/o emesis and headache. Reports taking her methadone and caffeine pills, then 20-30 minutes later started having symptoms. Found to have SAH in both sylvian fissures as well as in midline superiorly. CTA revealed ruptured ACOM aneurysm. TXA given. [facility] alert. Taken to cath lab and had coiling to right ACOM. ICU post-op for monitoring.     NIH 1: facial palsy. Ox4. Follows commands. Afebrile. NSR. On room air. SLP/PT/OT. Telemetry, neuro checks, n/v checks, external urinary catheter, SCDs. Low stim guidelines. Intensivist and Neuro Intervention following. Cardene @ 7.5 mg/hr, asa, neurontin, IV solumedrol 250 mg daily, prn IV morphine, nimotop, prn zofran. Received IV benadryl x1, methadone x1, IV compazine x2, 1L fld bolus x1, 50 mg tramadol x1, and loading dose of keppra x1 yesterday. CO2 18, WBC 21.5 - now 18.7. Urine w/trace leukocytes.     Procedures:    ACOM aneurysm coiling    Imagin/30 CT Head: Moderately extensive subarachnoid hemorrhage..    CT Cervical spine: No acute findings   CTA Head/Neck: 1. 3.3 x 3 mm aneurysm arising from the anterior communicating artery. There is subarachnoid hemorrhage seen on the noncontrast CT scan of the brain.  2. Otherwise negative CTA of the head

## 2024-07-01 NOTE — NURSE NAVIGATOR
Neuro Nurse Navigator Follow Up    This RN in to attempt stroke education with patient. Patient's nurse, Collette, reports patient c/o headache this morning. Upon entering room, patient found sleeping in bed. Attempted to wake patient by calling her name. No response. This RN left stroke folder at bedside. Pt will need stroke education once awake and able to receive.     Stroke Folder given.   What is Stroke/CVA  Signs and Symptoms of stroke (BEFAST)  Treatments for Stroke  Personal Risk Factors for Stroke discussed  Education--Call [phone]

## 2024-07-01 NOTE — PROCEDURES
PROCEDURE NOTE  Date: 7/1/2024   Name: Marika Peters  YOB: 1975    Procedures  12 lead EKG completed. Results handed to Cal Tapia

## 2024-07-02 ENCOUNTER — APPOINTMENT (OUTPATIENT)
Age: 49
DRG: 030 | End: 2024-07-02
Payer: COMMERCIAL

## 2024-07-02 ENCOUNTER — APPOINTMENT (OUTPATIENT)
Dept: CT IMAGING | Age: 49
DRG: 030 | End: 2024-07-02
Payer: COMMERCIAL

## 2024-07-02 LAB
ANION GAP SERPL CALC-SCNC: 10 MEQ/L (ref 8–16)
ANION GAP SERPL CALC-SCNC: 11 MEQ/L (ref 8–16)
ANION GAP SERPL CALC-SCNC: 14 MEQ/L (ref 8–16)
BUN SERPL-MCNC: 21 MG/DL (ref 7–22)
BUN SERPL-MCNC: 22 MG/DL (ref 7–22)
BUN SERPL-MCNC: 22 MG/DL (ref 7–22)
CALCIUM SERPL-MCNC: 8.9 MG/DL (ref 8.5–10.5)
CALCIUM SERPL-MCNC: 9 MG/DL (ref 8.5–10.5)
CALCIUM SERPL-MCNC: 9 MG/DL (ref 8.5–10.5)
CHLORIDE SERPL-SCNC: 102 MEQ/L (ref 98–111)
CHLORIDE SERPL-SCNC: 102 MEQ/L (ref 98–111)
CHLORIDE SERPL-SCNC: 103 MEQ/L (ref 98–111)
CHOLEST SERPL-MCNC: 197 MG/DL (ref 100–199)
CO2 SERPL-SCNC: 18 MEQ/L (ref 23–33)
CO2 SERPL-SCNC: 18 MEQ/L (ref 23–33)
CO2 SERPL-SCNC: 22 MEQ/L (ref 23–33)
CREAT SERPL-MCNC: 0.9 MG/DL (ref 0.4–1.2)
CREAT SERPL-MCNC: 0.9 MG/DL (ref 0.4–1.2)
CREAT SERPL-MCNC: 1 MG/DL (ref 0.4–1.2)
DEPRECATED RDW RBC AUTO: 45.7 FL (ref 35–45)
ECHO AR MAX VEL PISA: 4.3 M/S
ECHO AV AREA PEAK VELOCITY: 1 CM2
ECHO AV AREA VTI: 0.9 CM2
ECHO AV AREA/BSA PEAK VELOCITY: 0.6 CM2/M2
ECHO AV AREA/BSA VTI: 0.5 CM2/M2
ECHO AV CUSP MM: 1.3 CM
ECHO AV MEAN GRADIENT: 44 MMHG
ECHO AV MEAN VELOCITY: 2.7 M/S
ECHO AV PEAK GRADIENT: 80 MMHG
ECHO AV PEAK VELOCITY: 4.5 M/S
ECHO AV REGURGITANT PHT: 623 MS
ECHO AV VELOCITY RATIO: 0.33
ECHO AV VTI: 87.9 CM
ECHO BSA: 1.84 M2
ECHO LA AREA 2C: 18.2 CM2
ECHO LA AREA 4C: 18.5 CM2
ECHO LA DIAMETER INDEX: 2.05 CM/M2
ECHO LA DIAMETER: 3.6 CM
ECHO LA MAJOR AXIS: 5 CM
ECHO LA MINOR AXIS: 4.7 CM
ECHO LA VOL BP: 57 ML (ref 22–52)
ECHO LA VOL MOD A2C: 57 ML (ref 22–52)
ECHO LA VOL MOD A4C: 55 ML (ref 22–52)
ECHO LA VOL/BSA BIPLANE: 32 ML/M2 (ref 16–34)
ECHO LA VOLUME INDEX MOD A2C: 32 ML/M2 (ref 16–34)
ECHO LA VOLUME INDEX MOD A4C: 31 ML/M2 (ref 16–34)
ECHO LV E' LATERAL VELOCITY: 11 CM/S
ECHO LV E' SEPTAL VELOCITY: 10 CM/S
ECHO LV FRACTIONAL SHORTENING: 45 % (ref 28–44)
ECHO LV INTERNAL DIMENSION DIASTOLE INDEX: 2.9 CM/M2
ECHO LV INTERNAL DIMENSION DIASTOLIC: 5.1 CM (ref 3.9–5.3)
ECHO LV INTERNAL DIMENSION SYSTOLIC INDEX: 1.59 CM/M2
ECHO LV INTERNAL DIMENSION SYSTOLIC: 2.8 CM
ECHO LV IVSD: 0.9 CM (ref 0.6–0.9)
ECHO LV MASS 2D: 163.6 G (ref 67–162)
ECHO LV MASS INDEX 2D: 92.9 G/M2 (ref 43–95)
ECHO LV POSTERIOR WALL DIASTOLIC: 0.9 CM (ref 0.6–0.9)
ECHO LV RELATIVE WALL THICKNESS RATIO: 0.35
ECHO LVOT AREA: 2.8 CM2
ECHO LVOT AV VTI INDEX: 0.34
ECHO LVOT DIAM: 1.9 CM
ECHO LVOT MEAN GRADIENT: 5 MMHG
ECHO LVOT PEAK GRADIENT: 9 MMHG
ECHO LVOT PEAK VELOCITY: 1.5 M/S
ECHO LVOT STROKE VOLUME INDEX: 48.1 ML/M2
ECHO LVOT SV: 84.7 ML
ECHO LVOT VTI: 29.9 CM
ECHO MV A VELOCITY: 1.01 M/S
ECHO MV E DECELERATION TIME (DT): 218 MS
ECHO MV E VELOCITY: 1.27 M/S
ECHO MV E/A RATIO: 1.26
ECHO MV E/E' LATERAL: 11.55
ECHO MV E/E' RATIO (AVERAGED): 12.12
ECHO MV E/E' SEPTAL: 12.7
ECHO MV REGURGITANT PEAK GRADIENT: 55 MMHG
ECHO MV REGURGITANT PEAK VELOCITY: 3.7 M/S
ECHO PV MAX VELOCITY: 0.9 M/S
ECHO PV PEAK GRADIENT: 3 MMHG
ECHO RV INTERNAL DIMENSION: 2.7 CM
ECHO TV E WAVE: 0.8 M/S
ECHO TV REGURGITANT MAX VELOCITY: 2.61 M/S
ECHO TV REGURGITANT MAX VELOCITY: 2.61 M/S
ECHO TV REGURGITANT PEAK GRADIENT: 27 MMHG
ERYTHROCYTE [DISTWIDTH] IN BLOOD BY AUTOMATED COUNT: 14.6 % (ref 11.5–14.5)
GFR SERPL CREATININE-BSD FRML MDRD: 69 ML/MIN/1.73M2
GFR SERPL CREATININE-BSD FRML MDRD: 78 ML/MIN/1.73M2
GFR SERPL CREATININE-BSD FRML MDRD: 78 ML/MIN/1.73M2
GLUCOSE SERPL-MCNC: 113 MG/DL (ref 70–108)
GLUCOSE SERPL-MCNC: 120 MG/DL (ref 70–108)
GLUCOSE SERPL-MCNC: 121 MG/DL (ref 70–108)
HCT VFR BLD AUTO: 39.4 % (ref 37–47)
HDLC SERPL-MCNC: 52 MG/DL
HGB BLD-MCNC: 13.2 GM/DL (ref 12–16)
LDLC SERPL CALC-MCNC: 123 MG/DL
MCH RBC QN AUTO: 28.6 PG (ref 26–33)
MCHC RBC AUTO-ENTMCNC: 33.5 GM/DL (ref 32.2–35.5)
MCV RBC AUTO: 85.5 FL (ref 81–99)
PLATELET # BLD AUTO: 272 THOU/MM3 (ref 130–400)
PMV BLD AUTO: 10.2 FL (ref 9.4–12.4)
POTASSIUM SERPL-SCNC: 4.6 MEQ/L (ref 3.5–5.2)
POTASSIUM SERPL-SCNC: 4.8 MEQ/L (ref 3.5–5.2)
POTASSIUM SERPL-SCNC: 5.3 MEQ/L (ref 3.5–5.2)
RBC # BLD AUTO: 4.61 MILL/MM3 (ref 4.2–5.4)
SODIUM SERPL-SCNC: 132 MEQ/L (ref 135–145)
SODIUM SERPL-SCNC: 134 MEQ/L (ref 135–145)
SODIUM SERPL-SCNC: 134 MEQ/L (ref 135–145)
TRIGL SERPL-MCNC: 112 MG/DL (ref 0–199)
WBC # BLD AUTO: 24.3 THOU/MM3 (ref 4.8–10.8)

## 2024-07-02 PROCEDURE — 99232 SBSQ HOSP IP/OBS MODERATE 35: CPT

## 2024-07-02 PROCEDURE — 6360000002 HC RX W HCPCS

## 2024-07-02 PROCEDURE — 85027 COMPLETE CBC AUTOMATED: CPT

## 2024-07-02 PROCEDURE — 97116 GAIT TRAINING THERAPY: CPT

## 2024-07-02 PROCEDURE — 6370000000 HC RX 637 (ALT 250 FOR IP)

## 2024-07-02 PROCEDURE — 70450 CT HEAD/BRAIN W/O DYE: CPT

## 2024-07-02 PROCEDURE — 99291 CRITICAL CARE FIRST HOUR: CPT | Performed by: INTERNAL MEDICINE

## 2024-07-02 PROCEDURE — 2580000003 HC RX 258

## 2024-07-02 PROCEDURE — 36415 COLL VENOUS BLD VENIPUNCTURE: CPT

## 2024-07-02 PROCEDURE — 93306 TTE W/DOPPLER COMPLETE: CPT

## 2024-07-02 PROCEDURE — 6370000000 HC RX 637 (ALT 250 FOR IP): Performed by: NURSE PRACTITIONER

## 2024-07-02 PROCEDURE — 6370000000 HC RX 637 (ALT 250 FOR IP): Performed by: PSYCHIATRY & NEUROLOGY

## 2024-07-02 PROCEDURE — 80061 LIPID PANEL: CPT

## 2024-07-02 PROCEDURE — 2000000000 HC ICU R&B

## 2024-07-02 PROCEDURE — 80048 BASIC METABOLIC PNL TOTAL CA: CPT

## 2024-07-02 PROCEDURE — 97162 PT EVAL MOD COMPLEX 30 MIN: CPT

## 2024-07-02 PROCEDURE — 93306 TTE W/DOPPLER COMPLETE: CPT | Performed by: INTERNAL MEDICINE

## 2024-07-02 RX ORDER — TRAMADOL HYDROCHLORIDE 50 MG/1
50 TABLET ORAL EVERY 6 HOURS PRN
Status: DISCONTINUED | OUTPATIENT
Start: 2024-07-02 | End: 2024-07-07

## 2024-07-02 RX ORDER — METHADONE HYDROCHLORIDE 10 MG/5ML
110 SOLUTION ORAL DAILY
Status: DISCONTINUED | OUTPATIENT
Start: 2024-07-02 | End: 2024-07-14 | Stop reason: HOSPADM

## 2024-07-02 RX ORDER — FAMOTIDINE 20 MG/1
20 TABLET, FILM COATED ORAL 2 TIMES DAILY
Status: DISCONTINUED | OUTPATIENT
Start: 2024-07-02 | End: 2024-07-14 | Stop reason: HOSPADM

## 2024-07-02 RX ORDER — METHADONE HYDROCHLORIDE 10 MG/ML
110 CONCENTRATE ORAL DAILY
COMMUNITY

## 2024-07-02 RX ORDER — 3% SODIUM CHLORIDE 3 G/100ML
60 INJECTION, SOLUTION INTRAVENOUS CONTINUOUS
Status: DISCONTINUED | OUTPATIENT
Start: 2024-07-02 | End: 2024-07-03

## 2024-07-02 RX ADMIN — SODIUM CHLORIDE 50 ML/HR: 3 INJECTION, SOLUTION INTRAVENOUS at 17:19

## 2024-07-02 RX ADMIN — FAMOTIDINE 20 MG: 20 TABLET, FILM COATED ORAL at 21:01

## 2024-07-02 RX ADMIN — HEPARIN SODIUM 5000 UNITS: 5000 INJECTION INTRAVENOUS; SUBCUTANEOUS at 06:43

## 2024-07-02 RX ADMIN — METHYLPREDNISOLONE 8 MG: 4 TABLET ORAL at 21:56

## 2024-07-02 RX ADMIN — TRAMADOL HYDROCHLORIDE 50 MG: 50 TABLET ORAL at 00:41

## 2024-07-02 RX ADMIN — TRAMADOL HYDROCHLORIDE 50 MG: 50 TABLET ORAL at 21:15

## 2024-07-02 RX ADMIN — NIMODIPINE 60 MG: 30 CAPSULE, LIQUID FILLED ORAL at 12:26

## 2024-07-02 RX ADMIN — TRAMADOL HYDROCHLORIDE 50 MG: 50 TABLET ORAL at 13:13

## 2024-07-02 RX ADMIN — DOCUSATE SODIUM 100 MG: 100 CAPSULE, LIQUID FILLED ORAL at 08:06

## 2024-07-02 RX ADMIN — METHYLPREDNISOLONE 4 MG: 4 TABLET ORAL at 06:45

## 2024-07-02 RX ADMIN — PREGABALIN 50 MG: 50 CAPSULE ORAL at 21:02

## 2024-07-02 RX ADMIN — PREGABALIN 50 MG: 50 CAPSULE ORAL at 13:14

## 2024-07-02 RX ADMIN — METHYLPREDNISOLONE 4 MG: 4 TABLET ORAL at 16:34

## 2024-07-02 RX ADMIN — NIMODIPINE 60 MG: 30 CAPSULE, LIQUID FILLED ORAL at 23:47

## 2024-07-02 RX ADMIN — NIMODIPINE 60 MG: 30 CAPSULE, LIQUID FILLED ORAL at 21:01

## 2024-07-02 RX ADMIN — METHYLPREDNISOLONE 4 MG: 4 TABLET ORAL at 12:26

## 2024-07-02 RX ADMIN — NIMODIPINE 60 MG: 30 CAPSULE, LIQUID FILLED ORAL at 08:06

## 2024-07-02 RX ADMIN — HEPARIN SODIUM 5000 UNITS: 5000 INJECTION INTRAVENOUS; SUBCUTANEOUS at 13:14

## 2024-07-02 RX ADMIN — TRAMADOL HYDROCHLORIDE 50 MG: 50 TABLET ORAL at 06:43

## 2024-07-02 RX ADMIN — ACETAMINOPHEN 650 MG: 325 TABLET ORAL at 04:14

## 2024-07-02 RX ADMIN — SENNOSIDES 8.6 MG: 8.6 TABLET, FILM COATED ORAL at 21:19

## 2024-07-02 RX ADMIN — ASPIRIN 81 MG 81 MG: 81 TABLET ORAL at 08:06

## 2024-07-02 RX ADMIN — ACETAMINOPHEN 650 MG: 325 TABLET ORAL at 08:06

## 2024-07-02 RX ADMIN — NIMODIPINE 60 MG: 30 CAPSULE, LIQUID FILLED ORAL at 16:34

## 2024-07-02 RX ADMIN — HYALURONIDASE (HUMAN RECOMBINANT) 150 UNITS: 150 INJECTION, SOLUTION SUBCUTANEOUS at 21:46

## 2024-07-02 RX ADMIN — HEPARIN SODIUM 5000 UNITS: 5000 INJECTION INTRAVENOUS; SUBCUTANEOUS at 21:02

## 2024-07-02 RX ADMIN — PREGABALIN 50 MG: 50 CAPSULE ORAL at 08:10

## 2024-07-02 RX ADMIN — NIMODIPINE 60 MG: 30 CAPSULE, LIQUID FILLED ORAL at 04:16

## 2024-07-02 RX ADMIN — METHADONE HYDROCHLORIDE 110 MG: 10 SOLUTION ORAL at 12:27

## 2024-07-02 ASSESSMENT — PAIN SCALES - WONG BAKER
WONGBAKER_NUMERICALRESPONSE: NO HURT
WONGBAKER_NUMERICALRESPONSE: NO HURT

## 2024-07-02 ASSESSMENT — PAIN SCALES - GENERAL
PAINLEVEL_OUTOF10: 9
PAINLEVEL_OUTOF10: 8
PAINLEVEL_OUTOF10: 6
PAINLEVEL_OUTOF10: 9
PAINLEVEL_OUTOF10: 9
PAINLEVEL_OUTOF10: 0
PAINLEVEL_OUTOF10: 6
PAINLEVEL_OUTOF10: 5
PAINLEVEL_OUTOF10: 7
PAINLEVEL_OUTOF10: 9
PAINLEVEL_OUTOF10: 7
PAINLEVEL_OUTOF10: 8
PAINLEVEL_OUTOF10: 8

## 2024-07-02 ASSESSMENT — PAIN DESCRIPTION - DESCRIPTORS
DESCRIPTORS: ACHING;DISCOMFORT

## 2024-07-02 ASSESSMENT — PAIN DESCRIPTION - LOCATION
LOCATION: HEAD

## 2024-07-03 ENCOUNTER — APPOINTMENT (OUTPATIENT)
Dept: INTERVENTIONAL RADIOLOGY/VASCULAR | Age: 49
DRG: 030 | End: 2024-07-03
Payer: COMMERCIAL

## 2024-07-03 LAB
ANION GAP SERPL CALC-SCNC: 10 MEQ/L (ref 8–16)
ANION GAP SERPL CALC-SCNC: 10 MEQ/L (ref 8–16)
ANION GAP SERPL CALC-SCNC: 11 MEQ/L (ref 8–16)
ANION GAP SERPL CALC-SCNC: 14 MEQ/L (ref 8–16)
ANION GAP SERPL CALC-SCNC: 14 MEQ/L (ref 8–16)
ANION GAP SERPL CALC-SCNC: 7 MEQ/L (ref 8–16)
ANION GAP SERPL CALC-SCNC: 9 MEQ/L (ref 8–16)
BUN SERPL-MCNC: 15 MG/DL (ref 7–22)
BUN SERPL-MCNC: 15 MG/DL (ref 7–22)
BUN SERPL-MCNC: 18 MG/DL (ref 7–22)
BUN SERPL-MCNC: 20 MG/DL (ref 7–22)
BUN SERPL-MCNC: 20 MG/DL (ref 7–22)
BUN SERPL-MCNC: 21 MG/DL (ref 7–22)
BUN SERPL-MCNC: 21 MG/DL (ref 7–22)
CALCIUM SERPL-MCNC: 8.2 MG/DL (ref 8.5–10.5)
CALCIUM SERPL-MCNC: 8.4 MG/DL (ref 8.5–10.5)
CALCIUM SERPL-MCNC: 8.4 MG/DL (ref 8.5–10.5)
CALCIUM SERPL-MCNC: 8.5 MG/DL (ref 8.5–10.5)
CALCIUM SERPL-MCNC: 8.6 MG/DL (ref 8.5–10.5)
CALCIUM SERPL-MCNC: 8.9 MG/DL (ref 8.5–10.5)
CALCIUM SERPL-MCNC: 9 MG/DL (ref 8.5–10.5)
CHLORIDE SERPL-SCNC: 104 MEQ/L (ref 98–111)
CHLORIDE SERPL-SCNC: 104 MEQ/L (ref 98–111)
CHLORIDE SERPL-SCNC: 105 MEQ/L (ref 98–111)
CHLORIDE SERPL-SCNC: 106 MEQ/L (ref 98–111)
CHLORIDE SERPL-SCNC: 106 MEQ/L (ref 98–111)
CHLORIDE SERPL-SCNC: 107 MEQ/L (ref 98–111)
CHLORIDE SERPL-SCNC: 110 MEQ/L (ref 98–111)
CO2 SERPL-SCNC: 17 MEQ/L (ref 23–33)
CO2 SERPL-SCNC: 17 MEQ/L (ref 23–33)
CO2 SERPL-SCNC: 20 MEQ/L (ref 23–33)
CO2 SERPL-SCNC: 21 MEQ/L (ref 23–33)
CO2 SERPL-SCNC: 21 MEQ/L (ref 23–33)
CO2 SERPL-SCNC: 22 MEQ/L (ref 23–33)
CO2 SERPL-SCNC: 23 MEQ/L (ref 23–33)
CREAT SERPL-MCNC: 0.8 MG/DL (ref 0.4–1.2)
CREAT SERPL-MCNC: 0.9 MG/DL (ref 0.4–1.2)
DEPRECATED RDW RBC AUTO: 44.9 FL (ref 35–45)
ECHO BSA: 1.84 M2
ERYTHROCYTE [DISTWIDTH] IN BLOOD BY AUTOMATED COUNT: 14.6 % (ref 11.5–14.5)
GFR SERPL CREATININE-BSD FRML MDRD: 78 ML/MIN/1.73M2
GFR SERPL CREATININE-BSD FRML MDRD: 90 ML/MIN/1.73M2
GLUCOSE SERPL-MCNC: 109 MG/DL (ref 70–108)
GLUCOSE SERPL-MCNC: 111 MG/DL (ref 70–108)
GLUCOSE SERPL-MCNC: 113 MG/DL (ref 70–108)
GLUCOSE SERPL-MCNC: 114 MG/DL (ref 70–108)
GLUCOSE SERPL-MCNC: 140 MG/DL (ref 70–108)
GLUCOSE SERPL-MCNC: 146 MG/DL (ref 70–108)
GLUCOSE SERPL-MCNC: 95 MG/DL (ref 70–108)
HCT VFR BLD AUTO: 40.1 % (ref 37–47)
HGB BLD-MCNC: 13.8 GM/DL (ref 12–16)
MCH RBC QN AUTO: 29.2 PG (ref 26–33)
MCHC RBC AUTO-ENTMCNC: 34.4 GM/DL (ref 32.2–35.5)
MCV RBC AUTO: 84.8 FL (ref 81–99)
PLATELET # BLD AUTO: 214 THOU/MM3 (ref 130–400)
PMV BLD AUTO: 10.2 FL (ref 9.4–12.4)
POTASSIUM SERPL-SCNC: 3.9 MEQ/L (ref 3.5–5.2)
POTASSIUM SERPL-SCNC: 4 MEQ/L (ref 3.5–5.2)
POTASSIUM SERPL-SCNC: 4.1 MEQ/L (ref 3.5–5.2)
POTASSIUM SERPL-SCNC: 4.2 MEQ/L (ref 3.5–5.2)
POTASSIUM SERPL-SCNC: 4.3 MEQ/L (ref 3.5–5.2)
POTASSIUM SERPL-SCNC: 4.3 MEQ/L (ref 3.5–5.2)
POTASSIUM SERPL-SCNC: 4.4 MEQ/L (ref 3.5–5.2)
RBC # BLD AUTO: 4.73 MILL/MM3 (ref 4.2–5.4)
REASON FOR REJECTION: NORMAL
REJECTED TEST: NORMAL
SODIUM SERPL-SCNC: 135 MEQ/L (ref 135–145)
SODIUM SERPL-SCNC: 135 MEQ/L (ref 135–145)
SODIUM SERPL-SCNC: 136 MEQ/L (ref 135–145)
SODIUM SERPL-SCNC: 137 MEQ/L (ref 135–145)
SODIUM SERPL-SCNC: 137 MEQ/L (ref 135–145)
SODIUM SERPL-SCNC: 138 MEQ/L (ref 135–145)
SODIUM SERPL-SCNC: 140 MEQ/L (ref 135–145)
WBC # BLD AUTO: 14.5 THOU/MM3 (ref 4.8–10.8)

## 2024-07-03 PROCEDURE — 2580000003 HC RX 258: Performed by: PSYCHIATRY & NEUROLOGY

## 2024-07-03 PROCEDURE — 80048 BASIC METABOLIC PNL TOTAL CA: CPT

## 2024-07-03 PROCEDURE — 97129 THER IVNTJ 1ST 15 MIN: CPT

## 2024-07-03 PROCEDURE — 93886 INTRACRANIAL COMPLETE STUDY: CPT

## 2024-07-03 PROCEDURE — 6360000002 HC RX W HCPCS

## 2024-07-03 PROCEDURE — 99232 SBSQ HOSP IP/OBS MODERATE 35: CPT

## 2024-07-03 PROCEDURE — 99291 CRITICAL CARE FIRST HOUR: CPT | Performed by: INTERNAL MEDICINE

## 2024-07-03 PROCEDURE — 36415 COLL VENOUS BLD VENIPUNCTURE: CPT

## 2024-07-03 PROCEDURE — 2000000000 HC ICU R&B

## 2024-07-03 PROCEDURE — C1751 CATH, INF, PER/CENT/MIDLINE: HCPCS

## 2024-07-03 PROCEDURE — 36592 COLLECT BLOOD FROM PICC: CPT

## 2024-07-03 PROCEDURE — 92523 SPEECH SOUND LANG COMPREHEN: CPT

## 2024-07-03 PROCEDURE — 76937 US GUIDE VASCULAR ACCESS: CPT

## 2024-07-03 PROCEDURE — 02HV33Z INSERTION OF INFUSION DEVICE INTO SUPERIOR VENA CAVA, PERCUTANEOUS APPROACH: ICD-10-PCS | Performed by: INTERNAL MEDICINE

## 2024-07-03 PROCEDURE — 6370000000 HC RX 637 (ALT 250 FOR IP): Performed by: NURSE PRACTITIONER

## 2024-07-03 PROCEDURE — 2580000003 HC RX 258

## 2024-07-03 PROCEDURE — 6370000000 HC RX 637 (ALT 250 FOR IP): Performed by: PSYCHIATRY & NEUROLOGY

## 2024-07-03 PROCEDURE — 36569 INSJ PICC 5 YR+ W/O IMAGING: CPT

## 2024-07-03 PROCEDURE — 6370000000 HC RX 637 (ALT 250 FOR IP)

## 2024-07-03 PROCEDURE — 97530 THERAPEUTIC ACTIVITIES: CPT

## 2024-07-03 PROCEDURE — 97116 GAIT TRAINING THERAPY: CPT

## 2024-07-03 PROCEDURE — 85027 COMPLETE CBC AUTOMATED: CPT

## 2024-07-03 RX ORDER — BUPROPION HYDROCHLORIDE 300 MG/1
300 TABLET ORAL EVERY MORNING
Status: DISCONTINUED | OUTPATIENT
Start: 2024-07-03 | End: 2024-07-14 | Stop reason: HOSPADM

## 2024-07-03 RX ORDER — AMLODIPINE BESYLATE 10 MG/1
10 TABLET ORAL DAILY
Status: DISCONTINUED | OUTPATIENT
Start: 2024-07-04 | End: 2024-07-14 | Stop reason: HOSPADM

## 2024-07-03 RX ORDER — SODIUM CHLORIDE 0.9 % (FLUSH) 0.9 %
5-40 SYRINGE (ML) INJECTION PRN
Status: DISCONTINUED | OUTPATIENT
Start: 2024-07-03 | End: 2024-07-03 | Stop reason: SDUPTHER

## 2024-07-03 RX ORDER — SODIUM CHLORIDE 0.9 % (FLUSH) 0.9 %
5-40 SYRINGE (ML) INJECTION EVERY 12 HOURS SCHEDULED
Status: DISCONTINUED | OUTPATIENT
Start: 2024-07-03 | End: 2024-07-03 | Stop reason: SDUPTHER

## 2024-07-03 RX ORDER — HYDRALAZINE HYDROCHLORIDE 25 MG/1
25 TABLET, FILM COATED ORAL EVERY 8 HOURS SCHEDULED
Status: DISCONTINUED | OUTPATIENT
Start: 2024-07-03 | End: 2024-07-03

## 2024-07-03 RX ORDER — AMLODIPINE BESYLATE 5 MG/1
5 TABLET ORAL DAILY
Status: DISCONTINUED | OUTPATIENT
Start: 2024-07-04 | End: 2024-07-03

## 2024-07-03 RX ORDER — HYDROXYZINE HYDROCHLORIDE 25 MG/1
50 TABLET, FILM COATED ORAL NIGHTLY
Status: DISCONTINUED | OUTPATIENT
Start: 2024-07-03 | End: 2024-07-14 | Stop reason: HOSPADM

## 2024-07-03 RX ORDER — LISINOPRIL 10 MG/1
10 TABLET ORAL DAILY
Status: DISCONTINUED | OUTPATIENT
Start: 2024-07-03 | End: 2024-07-05

## 2024-07-03 RX ORDER — HYDRALAZINE HYDROCHLORIDE 25 MG/1
25 TABLET, FILM COATED ORAL AS NEEDED
Status: DISCONTINUED | OUTPATIENT
Start: 2024-07-03 | End: 2024-07-04

## 2024-07-03 RX ORDER — 3% SODIUM CHLORIDE 3 G/100ML
115 INJECTION, SOLUTION INTRAVENOUS CONTINUOUS
Status: DISPENSED | OUTPATIENT
Start: 2024-07-03 | End: 2024-07-07

## 2024-07-03 RX ORDER — SODIUM CHLORIDE 9 MG/ML
INJECTION, SOLUTION INTRAVENOUS PRN
Status: DISCONTINUED | OUTPATIENT
Start: 2024-07-03 | End: 2024-07-03 | Stop reason: SDUPTHER

## 2024-07-03 RX ORDER — AMLODIPINE BESYLATE 5 MG/1
5 TABLET ORAL ONCE
Status: COMPLETED | OUTPATIENT
Start: 2024-07-03 | End: 2024-07-03

## 2024-07-03 RX ADMIN — LISINOPRIL 10 MG: 10 TABLET ORAL at 16:16

## 2024-07-03 RX ADMIN — HYDRALAZINE HYDROCHLORIDE 25 MG: 25 TABLET ORAL at 13:15

## 2024-07-03 RX ADMIN — METHYLPREDNISOLONE 4 MG: 4 TABLET ORAL at 13:16

## 2024-07-03 RX ADMIN — TRAMADOL HYDROCHLORIDE 50 MG: 50 TABLET ORAL at 08:55

## 2024-07-03 RX ADMIN — SODIUM CHLORIDE, PRESERVATIVE FREE 10 ML: 5 INJECTION INTRAVENOUS at 08:53

## 2024-07-03 RX ADMIN — FAMOTIDINE 20 MG: 20 TABLET, FILM COATED ORAL at 08:56

## 2024-07-03 RX ADMIN — HYDROXYZINE HYDROCHLORIDE 50 MG: 25 TABLET ORAL at 20:13

## 2024-07-03 RX ADMIN — NIMODIPINE 60 MG: 30 CAPSULE, LIQUID FILLED ORAL at 23:08

## 2024-07-03 RX ADMIN — NIMODIPINE 60 MG: 30 CAPSULE, LIQUID FILLED ORAL at 20:13

## 2024-07-03 RX ADMIN — PREGABALIN 50 MG: 50 CAPSULE ORAL at 08:55

## 2024-07-03 RX ADMIN — FAMOTIDINE 20 MG: 20 TABLET, FILM COATED ORAL at 20:13

## 2024-07-03 RX ADMIN — SODIUM CHLORIDE 7.5 MG/HR: 9 INJECTION, SOLUTION INTRAVENOUS at 14:54

## 2024-07-03 RX ADMIN — TRAMADOL HYDROCHLORIDE 50 MG: 50 TABLET ORAL at 04:21

## 2024-07-03 RX ADMIN — SODIUM CHLORIDE 60 ML/HR: 3 INJECTION, SOLUTION INTRAVENOUS at 14:55

## 2024-07-03 RX ADMIN — PREGABALIN 50 MG: 50 CAPSULE ORAL at 20:14

## 2024-07-03 RX ADMIN — AMLODIPINE BESYLATE 5 MG: 5 TABLET ORAL at 06:47

## 2024-07-03 RX ADMIN — SENNOSIDES 8.6 MG: 8.6 TABLET, FILM COATED ORAL at 20:13

## 2024-07-03 RX ADMIN — NIMODIPINE 60 MG: 30 CAPSULE, LIQUID FILLED ORAL at 04:21

## 2024-07-03 RX ADMIN — ACETAMINOPHEN 650 MG: 325 TABLET ORAL at 18:42

## 2024-07-03 RX ADMIN — SODIUM CHLORIDE 2.5 MG/HR: 9 INJECTION, SOLUTION INTRAVENOUS at 20:23

## 2024-07-03 RX ADMIN — METHYLPREDNISOLONE 4 MG: 4 TABLET ORAL at 20:13

## 2024-07-03 RX ADMIN — METHYLPREDNISOLONE 4 MG: 4 TABLET ORAL at 06:47

## 2024-07-03 RX ADMIN — ASPIRIN 81 MG 81 MG: 81 TABLET ORAL at 08:56

## 2024-07-03 RX ADMIN — HEPARIN SODIUM 5000 UNITS: 5000 INJECTION INTRAVENOUS; SUBCUTANEOUS at 06:47

## 2024-07-03 RX ADMIN — HEPARIN SODIUM 5000 UNITS: 5000 INJECTION INTRAVENOUS; SUBCUTANEOUS at 13:15

## 2024-07-03 RX ADMIN — SODIUM CHLORIDE 5 MG/HR: 9 INJECTION, SOLUTION INTRAVENOUS at 10:40

## 2024-07-03 RX ADMIN — TRAMADOL HYDROCHLORIDE 50 MG: 50 TABLET ORAL at 16:59

## 2024-07-03 RX ADMIN — SODIUM CHLORIDE 5 MG/HR: 9 INJECTION, SOLUTION INTRAVENOUS at 00:11

## 2024-07-03 RX ADMIN — METHADONE HYDROCHLORIDE 110 MG: 10 SOLUTION ORAL at 08:54

## 2024-07-03 RX ADMIN — METHYLPREDNISOLONE 4 MG: 4 TABLET ORAL at 16:59

## 2024-07-03 RX ADMIN — NIMODIPINE 60 MG: 30 CAPSULE, LIQUID FILLED ORAL at 13:16

## 2024-07-03 RX ADMIN — DOCUSATE SODIUM 100 MG: 100 CAPSULE, LIQUID FILLED ORAL at 08:56

## 2024-07-03 RX ADMIN — HEPARIN SODIUM 5000 UNITS: 5000 INJECTION INTRAVENOUS; SUBCUTANEOUS at 20:14

## 2024-07-03 RX ADMIN — SODIUM CHLORIDE 50 ML/HR: 3 INJECTION, SOLUTION INTRAVENOUS at 04:27

## 2024-07-03 RX ADMIN — PREGABALIN 50 MG: 50 CAPSULE ORAL at 13:15

## 2024-07-03 RX ADMIN — BUPROPION HYDROCHLORIDE 300 MG: 300 TABLET, EXTENDED RELEASE ORAL at 08:56

## 2024-07-03 RX ADMIN — NIMODIPINE 60 MG: 30 CAPSULE, LIQUID FILLED ORAL at 08:55

## 2024-07-03 RX ADMIN — NIMODIPINE 60 MG: 30 CAPSULE, LIQUID FILLED ORAL at 16:18

## 2024-07-03 ASSESSMENT — PAIN SCALES - GENERAL
PAINLEVEL_OUTOF10: 4
PAINLEVEL_OUTOF10: 7
PAINLEVEL_OUTOF10: 3
PAINLEVEL_OUTOF10: 5
PAINLEVEL_OUTOF10: 6
PAINLEVEL_OUTOF10: 7
PAINLEVEL_OUTOF10: 7

## 2024-07-03 ASSESSMENT — PAIN DESCRIPTION - ORIENTATION
ORIENTATION: MID
ORIENTATION: MID
ORIENTATION: ANTERIOR;POSTERIOR

## 2024-07-03 ASSESSMENT — PAIN - FUNCTIONAL ASSESSMENT
PAIN_FUNCTIONAL_ASSESSMENT: PREVENTS OR INTERFERES SOME ACTIVE ACTIVITIES AND ADLS
PAIN_FUNCTIONAL_ASSESSMENT: PREVENTS OR INTERFERES SOME ACTIVE ACTIVITIES AND ADLS

## 2024-07-03 ASSESSMENT — PAIN DESCRIPTION - DESCRIPTORS
DESCRIPTORS: SHARP

## 2024-07-03 ASSESSMENT — PAIN DESCRIPTION - LOCATION
LOCATION: HEAD

## 2024-07-04 PROBLEM — I60.7 RUPTURED CEREBRAL ANEURYSM (HCC): Status: ACTIVE | Noted: 2024-07-04

## 2024-07-04 LAB
ANION GAP SERPL CALC-SCNC: 10 MEQ/L (ref 8–16)
ANION GAP SERPL CALC-SCNC: 11 MEQ/L (ref 8–16)
ANION GAP SERPL CALC-SCNC: 7 MEQ/L (ref 8–16)
ANION GAP SERPL CALC-SCNC: 8 MEQ/L (ref 8–16)
ANION GAP SERPL CALC-SCNC: 9 MEQ/L (ref 8–16)
BUN SERPL-MCNC: 12 MG/DL (ref 7–22)
BUN SERPL-MCNC: 13 MG/DL (ref 7–22)
BUN SERPL-MCNC: 16 MG/DL (ref 7–22)
CALCIUM SERPL-MCNC: 8.2 MG/DL (ref 8.5–10.5)
CALCIUM SERPL-MCNC: 8.3 MG/DL (ref 8.5–10.5)
CALCIUM SERPL-MCNC: 8.4 MG/DL (ref 8.5–10.5)
CHLORIDE SERPL-SCNC: 107 MEQ/L (ref 98–111)
CHLORIDE SERPL-SCNC: 107 MEQ/L (ref 98–111)
CHLORIDE SERPL-SCNC: 108 MEQ/L (ref 98–111)
CHLORIDE SERPL-SCNC: 110 MEQ/L (ref 98–111)
CHLORIDE SERPL-SCNC: 112 MEQ/L (ref 98–111)
CO2 SERPL-SCNC: 21 MEQ/L (ref 23–33)
CO2 SERPL-SCNC: 23 MEQ/L (ref 23–33)
CO2 SERPL-SCNC: 23 MEQ/L (ref 23–33)
CREAT SERPL-MCNC: 0.8 MG/DL (ref 0.4–1.2)
CREAT SERPL-MCNC: 0.9 MG/DL (ref 0.4–1.2)
CREAT SERPL-MCNC: 0.9 MG/DL (ref 0.4–1.2)
DEPRECATED RDW RBC AUTO: 45.6 FL (ref 35–45)
ERYTHROCYTE [DISTWIDTH] IN BLOOD BY AUTOMATED COUNT: 14.4 % (ref 11.5–14.5)
GFR SERPL CREATININE-BSD FRML MDRD: 78 ML/MIN/1.73M2
GFR SERPL CREATININE-BSD FRML MDRD: 78 ML/MIN/1.73M2
GFR SERPL CREATININE-BSD FRML MDRD: 90 ML/MIN/1.73M2
GLUCOSE SERPL-MCNC: 108 MG/DL (ref 70–108)
GLUCOSE SERPL-MCNC: 123 MG/DL (ref 70–108)
GLUCOSE SERPL-MCNC: 132 MG/DL (ref 70–108)
GLUCOSE SERPL-MCNC: 138 MG/DL (ref 70–108)
GLUCOSE SERPL-MCNC: 85 MG/DL (ref 70–108)
HCT VFR BLD AUTO: 39.9 % (ref 37–47)
HGB BLD-MCNC: 12.7 GM/DL (ref 12–16)
MCH RBC QN AUTO: 27.8 PG (ref 26–33)
MCHC RBC AUTO-ENTMCNC: 31.8 GM/DL (ref 32.2–35.5)
MCV RBC AUTO: 87.3 FL (ref 81–99)
PLATELET # BLD AUTO: 233 THOU/MM3 (ref 130–400)
PMV BLD AUTO: 10 FL (ref 9.4–12.4)
POTASSIUM SERPL-SCNC: 3.7 MEQ/L (ref 3.5–5.2)
POTASSIUM SERPL-SCNC: 3.9 MEQ/L (ref 3.5–5.2)
POTASSIUM SERPL-SCNC: 4 MEQ/L (ref 3.5–5.2)
POTASSIUM SERPL-SCNC: 4.2 MEQ/L (ref 3.5–5.2)
POTASSIUM SERPL-SCNC: 4.2 MEQ/L (ref 3.5–5.2)
RBC # BLD AUTO: 4.57 MILL/MM3 (ref 4.2–5.4)
SODIUM SERPL-SCNC: 138 MEQ/L (ref 135–145)
SODIUM SERPL-SCNC: 138 MEQ/L (ref 135–145)
SODIUM SERPL-SCNC: 140 MEQ/L (ref 135–145)
SODIUM SERPL-SCNC: 140 MEQ/L (ref 135–145)
SODIUM SERPL-SCNC: 142 MEQ/L (ref 135–145)
WBC # BLD AUTO: 13.9 THOU/MM3 (ref 4.8–10.8)

## 2024-07-04 PROCEDURE — 99291 CRITICAL CARE FIRST HOUR: CPT | Performed by: SURGERY

## 2024-07-04 PROCEDURE — 6360000002 HC RX W HCPCS

## 2024-07-04 PROCEDURE — 6370000000 HC RX 637 (ALT 250 FOR IP): Performed by: NURSE PRACTITIONER

## 2024-07-04 PROCEDURE — 2580000003 HC RX 258: Performed by: PSYCHIATRY & NEUROLOGY

## 2024-07-04 PROCEDURE — 36592 COLLECT BLOOD FROM PICC: CPT

## 2024-07-04 PROCEDURE — 6370000000 HC RX 637 (ALT 250 FOR IP): Performed by: PSYCHIATRY & NEUROLOGY

## 2024-07-04 PROCEDURE — 2580000003 HC RX 258

## 2024-07-04 PROCEDURE — 6370000000 HC RX 637 (ALT 250 FOR IP)

## 2024-07-04 PROCEDURE — 99232 SBSQ HOSP IP/OBS MODERATE 35: CPT | Performed by: NURSE PRACTITIONER

## 2024-07-04 PROCEDURE — 2000000000 HC ICU R&B

## 2024-07-04 PROCEDURE — 80048 BASIC METABOLIC PNL TOTAL CA: CPT

## 2024-07-04 PROCEDURE — 85027 COMPLETE CBC AUTOMATED: CPT

## 2024-07-04 PROCEDURE — 2580000003 HC RX 258: Performed by: SURGERY

## 2024-07-04 PROCEDURE — 36415 COLL VENOUS BLD VENIPUNCTURE: CPT

## 2024-07-04 RX ORDER — CLONIDINE HYDROCHLORIDE 0.1 MG/1
0.1 TABLET ORAL 2 TIMES DAILY
Status: DISCONTINUED | OUTPATIENT
Start: 2024-07-04 | End: 2024-07-05

## 2024-07-04 RX ORDER — HYDRALAZINE HYDROCHLORIDE 25 MG/1
25 TABLET, FILM COATED ORAL EVERY 8 HOURS PRN
Status: DISCONTINUED | OUTPATIENT
Start: 2024-07-04 | End: 2024-07-05

## 2024-07-04 RX ADMIN — NIMODIPINE 60 MG: 30 CAPSULE, LIQUID FILLED ORAL at 20:42

## 2024-07-04 RX ADMIN — SENNOSIDES 8.6 MG: 8.6 TABLET, FILM COATED ORAL at 20:43

## 2024-07-04 RX ADMIN — PREGABALIN 50 MG: 50 CAPSULE ORAL at 20:43

## 2024-07-04 RX ADMIN — NIMODIPINE 60 MG: 30 CAPSULE, LIQUID FILLED ORAL at 08:53

## 2024-07-04 RX ADMIN — HYDRALAZINE HYDROCHLORIDE 25 MG: 25 TABLET ORAL at 17:35

## 2024-07-04 RX ADMIN — ACETAMINOPHEN 650 MG: 325 TABLET ORAL at 23:19

## 2024-07-04 RX ADMIN — NIMODIPINE 60 MG: 30 CAPSULE, LIQUID FILLED ORAL at 15:06

## 2024-07-04 RX ADMIN — METHYLPREDNISOLONE 4 MG: 4 TABLET ORAL at 05:54

## 2024-07-04 RX ADMIN — ACETAMINOPHEN 650 MG: 325 TABLET ORAL at 05:53

## 2024-07-04 RX ADMIN — METHYLPREDNISOLONE 4 MG: 4 TABLET ORAL at 11:39

## 2024-07-04 RX ADMIN — HYDROXYZINE HYDROCHLORIDE 50 MG: 25 TABLET ORAL at 20:42

## 2024-07-04 RX ADMIN — SODIUM CHLORIDE 70 ML/HR: 3 INJECTION, SOLUTION INTRAVENOUS at 00:08

## 2024-07-04 RX ADMIN — ACETAMINOPHEN 650 MG: 325 TABLET ORAL at 17:36

## 2024-07-04 RX ADMIN — PREGABALIN 50 MG: 50 CAPSULE ORAL at 08:53

## 2024-07-04 RX ADMIN — NIMODIPINE 60 MG: 30 CAPSULE, LIQUID FILLED ORAL at 11:39

## 2024-07-04 RX ADMIN — FAMOTIDINE 20 MG: 20 TABLET, FILM COATED ORAL at 08:53

## 2024-07-04 RX ADMIN — HEPARIN SODIUM 5000 UNITS: 5000 INJECTION INTRAVENOUS; SUBCUTANEOUS at 05:54

## 2024-07-04 RX ADMIN — SODIUM CHLORIDE 7.5 MG/HR: 9 INJECTION, SOLUTION INTRAVENOUS at 09:04

## 2024-07-04 RX ADMIN — BUPROPION HYDROCHLORIDE 300 MG: 300 TABLET, EXTENDED RELEASE ORAL at 08:53

## 2024-07-04 RX ADMIN — TRAMADOL HYDROCHLORIDE 50 MG: 50 TABLET ORAL at 15:06

## 2024-07-04 RX ADMIN — LISINOPRIL 10 MG: 10 TABLET ORAL at 08:53

## 2024-07-04 RX ADMIN — TRAMADOL HYDROCHLORIDE 50 MG: 50 TABLET ORAL at 02:26

## 2024-07-04 RX ADMIN — CLONIDINE HYDROCHLORIDE 0.1 MG: 0.1 TABLET ORAL at 20:43

## 2024-07-04 RX ADMIN — METHADONE HYDROCHLORIDE 110 MG: 10 SOLUTION ORAL at 08:53

## 2024-07-04 RX ADMIN — SODIUM CHLORIDE, PRESERVATIVE FREE 10 ML: 5 INJECTION INTRAVENOUS at 20:44

## 2024-07-04 RX ADMIN — METHYLPREDNISOLONE 4 MG: 4 TABLET ORAL at 20:42

## 2024-07-04 RX ADMIN — AMLODIPINE BESYLATE 10 MG: 10 TABLET ORAL at 08:53

## 2024-07-04 RX ADMIN — FAMOTIDINE 20 MG: 20 TABLET, FILM COATED ORAL at 20:42

## 2024-07-04 RX ADMIN — HEPARIN SODIUM 5000 UNITS: 5000 INJECTION INTRAVENOUS; SUBCUTANEOUS at 15:06

## 2024-07-04 RX ADMIN — HEPARIN SODIUM 5000 UNITS: 5000 INJECTION INTRAVENOUS; SUBCUTANEOUS at 23:20

## 2024-07-04 RX ADMIN — TRAMADOL HYDROCHLORIDE 50 MG: 50 TABLET ORAL at 20:43

## 2024-07-04 RX ADMIN — PREGABALIN 50 MG: 50 CAPSULE ORAL at 15:05

## 2024-07-04 RX ADMIN — DOCUSATE SODIUM 100 MG: 100 CAPSULE, LIQUID FILLED ORAL at 08:53

## 2024-07-04 RX ADMIN — SODIUM CHLORIDE 7.5 MG/HR: 9 INJECTION, SOLUTION INTRAVENOUS at 05:43

## 2024-07-04 RX ADMIN — SODIUM CHLORIDE 75 ML/HR: 3 INJECTION, SOLUTION INTRAVENOUS at 16:04

## 2024-07-04 RX ADMIN — SODIUM CHLORIDE 75 ML/HR: 3 INJECTION, SOLUTION INTRAVENOUS at 09:27

## 2024-07-04 RX ADMIN — ASPIRIN 81 MG 81 MG: 81 TABLET ORAL at 08:53

## 2024-07-04 RX ADMIN — NIMODIPINE 60 MG: 30 CAPSULE, LIQUID FILLED ORAL at 03:32

## 2024-07-04 RX ADMIN — SODIUM CHLORIDE 85 ML/HR: 3 INJECTION, SOLUTION INTRAVENOUS at 23:28

## 2024-07-04 ASSESSMENT — PAIN DESCRIPTION - LOCATION
LOCATION: HEAD
LOCATION: HEAD;NECK
LOCATION: HEAD

## 2024-07-04 ASSESSMENT — PAIN DESCRIPTION - DESCRIPTORS
DESCRIPTORS: ACHING

## 2024-07-04 ASSESSMENT — PAIN DESCRIPTION - ORIENTATION
ORIENTATION: MID
ORIENTATION: MID

## 2024-07-04 ASSESSMENT — PAIN SCALES - GENERAL
PAINLEVEL_OUTOF10: 7
PAINLEVEL_OUTOF10: 4
PAINLEVEL_OUTOF10: 3
PAINLEVEL_OUTOF10: 3
PAINLEVEL_OUTOF10: 4
PAINLEVEL_OUTOF10: 7
PAINLEVEL_OUTOF10: 6
PAINLEVEL_OUTOF10: 5
PAINLEVEL_OUTOF10: 7
PAINLEVEL_OUTOF10: 7

## 2024-07-04 NOTE — PROCEDURES
PROCEDURE NOTE  Date: 7/4/2024   Name: Marika Peters  YOB: 1975    Procedures  This is a brief report for Trans Cranial Doppler performed yesterday on the patient.  This study was suboptimal however failed to show significant velocity or pulsitivity index in the major insonated intracranial vessel suggesting severe vasospasm.

## 2024-07-05 ENCOUNTER — APPOINTMENT (OUTPATIENT)
Dept: CT IMAGING | Age: 49
DRG: 030 | End: 2024-07-05
Payer: COMMERCIAL

## 2024-07-05 PROBLEM — I10 HYPERTENSION: Status: ACTIVE | Noted: 2024-07-05

## 2024-07-05 LAB
ANION GAP SERPL CALC-SCNC: 10 MEQ/L (ref 8–16)
ANION GAP SERPL CALC-SCNC: 10 MEQ/L (ref 8–16)
ANION GAP SERPL CALC-SCNC: 12 MEQ/L (ref 8–16)
ANION GAP SERPL CALC-SCNC: 12 MEQ/L (ref 8–16)
ANION GAP SERPL CALC-SCNC: 8 MEQ/L (ref 8–16)
BUN SERPL-MCNC: 10 MG/DL (ref 7–22)
BUN SERPL-MCNC: 11 MG/DL (ref 7–22)
BUN SERPL-MCNC: 9 MG/DL (ref 7–22)
CALCIUM SERPL-MCNC: 8 MG/DL (ref 8.5–10.5)
CALCIUM SERPL-MCNC: 8.1 MG/DL (ref 8.5–10.5)
CALCIUM SERPL-MCNC: 8.2 MG/DL (ref 8.5–10.5)
CHLORIDE SERPL-SCNC: 106 MEQ/L (ref 98–111)
CHLORIDE SERPL-SCNC: 107 MEQ/L (ref 98–111)
CHLORIDE SERPL-SCNC: 108 MEQ/L (ref 98–111)
CO2 SERPL-SCNC: 19 MEQ/L (ref 23–33)
CO2 SERPL-SCNC: 20 MEQ/L (ref 23–33)
CO2 SERPL-SCNC: 20 MEQ/L (ref 23–33)
CO2 SERPL-SCNC: 21 MEQ/L (ref 23–33)
CO2 SERPL-SCNC: 23 MEQ/L (ref 23–33)
CREAT SERPL-MCNC: 0.6 MG/DL (ref 0.4–1.2)
CREAT SERPL-MCNC: 0.7 MG/DL (ref 0.4–1.2)
DEPRECATED RDW RBC AUTO: 46.3 FL (ref 35–45)
ERYTHROCYTE [DISTWIDTH] IN BLOOD BY AUTOMATED COUNT: 14.3 % (ref 11.5–14.5)
GFR SERPL CREATININE-BSD FRML MDRD: > 90 ML/MIN/1.73M2
GLUCOSE SERPL-MCNC: 104 MG/DL (ref 70–108)
GLUCOSE SERPL-MCNC: 111 MG/DL (ref 70–108)
GLUCOSE SERPL-MCNC: 137 MG/DL (ref 70–108)
GLUCOSE SERPL-MCNC: 190 MG/DL (ref 70–108)
GLUCOSE SERPL-MCNC: 92 MG/DL (ref 70–108)
HCT VFR BLD AUTO: 39.3 % (ref 37–47)
HGB BLD-MCNC: 12.6 GM/DL (ref 12–16)
MCH RBC QN AUTO: 28.4 PG (ref 26–33)
MCHC RBC AUTO-ENTMCNC: 32.1 GM/DL (ref 32.2–35.5)
MCV RBC AUTO: 88.7 FL (ref 81–99)
PLATELET # BLD AUTO: 221 THOU/MM3 (ref 130–400)
PMV BLD AUTO: 9.8 FL (ref 9.4–12.4)
POTASSIUM SERPL-SCNC: 3.3 MEQ/L (ref 3.5–5.2)
POTASSIUM SERPL-SCNC: 3.5 MEQ/L (ref 3.5–5.2)
POTASSIUM SERPL-SCNC: 3.6 MEQ/L (ref 3.5–5.2)
POTASSIUM SERPL-SCNC: 3.9 MEQ/L (ref 3.5–5.2)
POTASSIUM SERPL-SCNC: 3.9 MEQ/L (ref 3.5–5.2)
RBC # BLD AUTO: 4.43 MILL/MM3 (ref 4.2–5.4)
SODIUM SERPL-SCNC: 136 MEQ/L (ref 135–145)
SODIUM SERPL-SCNC: 139 MEQ/L (ref 135–145)
WBC # BLD AUTO: 14.6 THOU/MM3 (ref 4.8–10.8)

## 2024-07-05 PROCEDURE — 6360000002 HC RX W HCPCS

## 2024-07-05 PROCEDURE — 6370000000 HC RX 637 (ALT 250 FOR IP)

## 2024-07-05 PROCEDURE — 6360000004 HC RX CONTRAST MEDICATION: Performed by: NURSE PRACTITIONER

## 2024-07-05 PROCEDURE — 85027 COMPLETE CBC AUTOMATED: CPT

## 2024-07-05 PROCEDURE — 99232 SBSQ HOSP IP/OBS MODERATE 35: CPT

## 2024-07-05 PROCEDURE — 2000000000 HC ICU R&B

## 2024-07-05 PROCEDURE — 36415 COLL VENOUS BLD VENIPUNCTURE: CPT

## 2024-07-05 PROCEDURE — 80048 BASIC METABOLIC PNL TOTAL CA: CPT

## 2024-07-05 PROCEDURE — 6370000000 HC RX 637 (ALT 250 FOR IP): Performed by: PSYCHIATRY & NEUROLOGY

## 2024-07-05 PROCEDURE — 2580000003 HC RX 258: Performed by: PSYCHIATRY & NEUROLOGY

## 2024-07-05 PROCEDURE — 6360000002 HC RX W HCPCS: Performed by: STUDENT IN AN ORGANIZED HEALTH CARE EDUCATION/TRAINING PROGRAM

## 2024-07-05 PROCEDURE — 2580000003 HC RX 258

## 2024-07-05 PROCEDURE — 70496 CT ANGIOGRAPHY HEAD: CPT

## 2024-07-05 PROCEDURE — 99291 CRITICAL CARE FIRST HOUR: CPT | Performed by: SURGERY

## 2024-07-05 PROCEDURE — 36592 COLLECT BLOOD FROM PICC: CPT

## 2024-07-05 PROCEDURE — 70450 CT HEAD/BRAIN W/O DYE: CPT

## 2024-07-05 PROCEDURE — 6370000000 HC RX 637 (ALT 250 FOR IP): Performed by: NURSE PRACTITIONER

## 2024-07-05 RX ORDER — HYDRALAZINE HYDROCHLORIDE 25 MG/1
25 TABLET, FILM COATED ORAL EVERY 8 HOURS SCHEDULED
Status: DISCONTINUED | OUTPATIENT
Start: 2024-07-05 | End: 2024-07-05

## 2024-07-05 RX ORDER — LISINOPRIL 10 MG/1
10 TABLET ORAL DAILY
Status: DISCONTINUED | OUTPATIENT
Start: 2024-07-06 | End: 2024-07-14 | Stop reason: HOSPADM

## 2024-07-05 RX ORDER — DOCUSATE SODIUM 100 MG/1
100 CAPSULE, LIQUID FILLED ORAL 2 TIMES DAILY
Status: DISCONTINUED | OUTPATIENT
Start: 2024-07-05 | End: 2024-07-08

## 2024-07-05 RX ORDER — POTASSIUM CHLORIDE 7.45 MG/ML
10 INJECTION INTRAVENOUS PRN
Status: DISCONTINUED | OUTPATIENT
Start: 2024-07-05 | End: 2024-07-14 | Stop reason: HOSPADM

## 2024-07-05 RX ORDER — HYDRALAZINE HYDROCHLORIDE 25 MG/1
25 TABLET, FILM COATED ORAL EVERY 8 HOURS PRN
Status: DISCONTINUED | OUTPATIENT
Start: 2024-07-05 | End: 2024-07-06

## 2024-07-05 RX ORDER — POTASSIUM CHLORIDE 29.8 MG/ML
20 INJECTION INTRAVENOUS PRN
Status: DISCONTINUED | OUTPATIENT
Start: 2024-07-05 | End: 2024-07-14 | Stop reason: HOSPADM

## 2024-07-05 RX ORDER — LISINOPRIL 20 MG/1
20 TABLET ORAL DAILY
Status: DISCONTINUED | OUTPATIENT
Start: 2024-07-05 | End: 2024-07-05

## 2024-07-05 RX ORDER — SENNOSIDES A AND B 8.6 MG/1
2 TABLET, FILM COATED ORAL NIGHTLY
Status: DISCONTINUED | OUTPATIENT
Start: 2024-07-05 | End: 2024-07-10

## 2024-07-05 RX ADMIN — NALOXEGOL OXALATE 12.5 MG: 12.5 TABLET, FILM COATED ORAL at 05:16

## 2024-07-05 RX ADMIN — METHADONE HYDROCHLORIDE 110 MG: 10 SOLUTION ORAL at 08:23

## 2024-07-05 RX ADMIN — NIMODIPINE 60 MG: 30 CAPSULE, LIQUID FILLED ORAL at 23:55

## 2024-07-05 RX ADMIN — HYDRALAZINE HYDROCHLORIDE 25 MG: 25 TABLET ORAL at 08:19

## 2024-07-05 RX ADMIN — METHYLPREDNISOLONE 4 MG: 4 TABLET ORAL at 23:54

## 2024-07-05 RX ADMIN — NIMODIPINE 60 MG: 30 CAPSULE, LIQUID FILLED ORAL at 12:17

## 2024-07-05 RX ADMIN — PREGABALIN 50 MG: 50 CAPSULE ORAL at 14:35

## 2024-07-05 RX ADMIN — TRAMADOL HYDROCHLORIDE 50 MG: 50 TABLET ORAL at 23:52

## 2024-07-05 RX ADMIN — IOPAMIDOL 80 ML: 755 INJECTION, SOLUTION INTRAVENOUS at 10:49

## 2024-07-05 RX ADMIN — TRAMADOL HYDROCHLORIDE 50 MG: 50 TABLET ORAL at 18:16

## 2024-07-05 RX ADMIN — PREGABALIN 50 MG: 50 CAPSULE ORAL at 20:28

## 2024-07-05 RX ADMIN — FAMOTIDINE 20 MG: 20 TABLET, FILM COATED ORAL at 20:27

## 2024-07-05 RX ADMIN — NIMODIPINE 60 MG: 30 CAPSULE, LIQUID FILLED ORAL at 03:45

## 2024-07-05 RX ADMIN — NIMODIPINE 60 MG: 30 CAPSULE, LIQUID FILLED ORAL at 08:19

## 2024-07-05 RX ADMIN — HEPARIN SODIUM 5000 UNITS: 5000 INJECTION INTRAVENOUS; SUBCUTANEOUS at 22:03

## 2024-07-05 RX ADMIN — ACETAMINOPHEN 650 MG: 325 TABLET ORAL at 20:49

## 2024-07-05 RX ADMIN — ACETAMINOPHEN 650 MG: 325 TABLET ORAL at 23:51

## 2024-07-05 RX ADMIN — POTASSIUM CHLORIDE 20 MEQ: 29.8 INJECTION, SOLUTION INTRAVENOUS at 23:51

## 2024-07-05 RX ADMIN — HYDROXYZINE HYDROCHLORIDE 50 MG: 25 TABLET ORAL at 23:54

## 2024-07-05 RX ADMIN — NIMODIPINE 60 MG: 30 CAPSULE, LIQUID FILLED ORAL at 20:25

## 2024-07-05 RX ADMIN — SODIUM CHLORIDE 105 ML/HR: 3 INJECTION, SOLUTION INTRAVENOUS at 06:26

## 2024-07-05 RX ADMIN — HEPARIN SODIUM 5000 UNITS: 5000 INJECTION INTRAVENOUS; SUBCUTANEOUS at 05:16

## 2024-07-05 RX ADMIN — TRAMADOL HYDROCHLORIDE 50 MG: 50 TABLET ORAL at 03:45

## 2024-07-05 RX ADMIN — NIMODIPINE 60 MG: 30 CAPSULE, LIQUID FILLED ORAL at 00:05

## 2024-07-05 RX ADMIN — ONDANSETRON 4 MG: 2 INJECTION INTRAMUSCULAR; INTRAVENOUS at 04:33

## 2024-07-05 RX ADMIN — SODIUM CHLORIDE, PRESERVATIVE FREE 10 ML: 5 INJECTION INTRAVENOUS at 08:23

## 2024-07-05 RX ADMIN — ASPIRIN 81 MG 81 MG: 81 TABLET ORAL at 08:20

## 2024-07-05 RX ADMIN — LISINOPRIL 20 MG: 20 TABLET ORAL at 08:21

## 2024-07-05 RX ADMIN — AMLODIPINE BESYLATE 10 MG: 10 TABLET ORAL at 08:20

## 2024-07-05 RX ADMIN — FAMOTIDINE 20 MG: 20 TABLET, FILM COATED ORAL at 08:20

## 2024-07-05 RX ADMIN — BUPROPION HYDROCHLORIDE 300 MG: 300 TABLET, EXTENDED RELEASE ORAL at 08:20

## 2024-07-05 RX ADMIN — NIMODIPINE 60 MG: 30 CAPSULE, LIQUID FILLED ORAL at 17:11

## 2024-07-05 RX ADMIN — CLONIDINE HYDROCHLORIDE 0.1 MG: 0.1 TABLET ORAL at 08:20

## 2024-07-05 RX ADMIN — DOCUSATE SODIUM 100 MG: 100 CAPSULE, LIQUID FILLED ORAL at 08:24

## 2024-07-05 RX ADMIN — METHYLPREDNISOLONE 4 MG: 4 TABLET ORAL at 05:16

## 2024-07-05 RX ADMIN — SODIUM CHLORIDE 5 MG/HR: 9 INJECTION, SOLUTION INTRAVENOUS at 04:21

## 2024-07-05 RX ADMIN — PREGABALIN 50 MG: 50 CAPSULE ORAL at 08:20

## 2024-07-05 RX ADMIN — ACETAMINOPHEN 650 MG: 325 TABLET ORAL at 06:29

## 2024-07-05 RX ADMIN — SODIUM CHLORIDE 5 MG/HR: 9 INJECTION, SOLUTION INTRAVENOUS at 04:34

## 2024-07-05 RX ADMIN — HEPARIN SODIUM 5000 UNITS: 5000 INJECTION INTRAVENOUS; SUBCUTANEOUS at 14:35

## 2024-07-05 ASSESSMENT — PAIN DESCRIPTION - PAIN TYPE
TYPE: ACUTE PAIN
TYPE: ACUTE PAIN
TYPE: ACUTE PAIN;CHRONIC PAIN

## 2024-07-05 ASSESSMENT — PAIN DESCRIPTION - DESCRIPTORS
DESCRIPTORS: ACHING
DESCRIPTORS: ACHING;DISCOMFORT;PRESSURE;SORE
DESCRIPTORS: ACHING;POUNDING
DESCRIPTORS: ACHING;DISCOMFORT;PRESSURE
DESCRIPTORS: ACHING;POUNDING

## 2024-07-05 ASSESSMENT — PAIN DESCRIPTION - FREQUENCY
FREQUENCY: CONTINUOUS

## 2024-07-05 ASSESSMENT — PAIN SCALES - GENERAL
PAINLEVEL_OUTOF10: 7
PAINLEVEL_OUTOF10: 7
PAINLEVEL_OUTOF10: 5
PAINLEVEL_OUTOF10: 6
PAINLEVEL_OUTOF10: 4
PAINLEVEL_OUTOF10: 0
PAINLEVEL_OUTOF10: 0
PAINLEVEL_OUTOF10: 4
PAINLEVEL_OUTOF10: 0
PAINLEVEL_OUTOF10: 5
PAINLEVEL_OUTOF10: 0
PAINLEVEL_OUTOF10: 6
PAINLEVEL_OUTOF10: 7

## 2024-07-05 ASSESSMENT — PAIN DESCRIPTION - ORIENTATION
ORIENTATION: ANTERIOR
ORIENTATION: MID
ORIENTATION: ANTERIOR

## 2024-07-05 ASSESSMENT — PAIN - FUNCTIONAL ASSESSMENT
PAIN_FUNCTIONAL_ASSESSMENT: ACTIVITIES ARE NOT PREVENTED

## 2024-07-05 ASSESSMENT — PAIN SCALES - WONG BAKER: WONGBAKER_NUMERICALRESPONSE: NO HURT

## 2024-07-05 ASSESSMENT — PAIN DESCRIPTION - ONSET
ONSET: ON-GOING

## 2024-07-05 ASSESSMENT — PAIN DESCRIPTION - LOCATION
LOCATION: HEAD

## 2024-07-05 NOTE — CARE COORDINATION
7/5/24, 1:59 PM EDT    DISCHARGE ON GOING EVALUATION    [facility] day: 5  Location: 4D-06/006-A Reason for admit: SAH (subarachnoid hemorrhage) (HCC) [I60.9]  Subarachnoid bleed (HCC) [I60.9]     Procedures:   6/30 ACOM aneurysm coiling   7/3 PICC right basilic    Imaging since last note:   7/1 CT Head: 1. Decreasing small/moderate volume subarachnoid hemorrhage.  2. Similar trace intraventricular hemorrhage.  3. Worsening hydrocephalus.  7/2 CT Head: 1. Continued extensive subarachnoid hemorrhage.  2. Status post coiling of an anterior communicating artery aneurysm..   3. Diminished attenuation in the white matter possibly representing ischemic  changes.  7/3 Transcranial doppler  7/5 CT Head: 1. 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  2. Residual subarachnoid and intraventricular hemorrhage which appears improved  since previous study dated 7/2/2024.  3. Otherwise negative noncontrast CT scan of the brain.  7/5 CTA Head: 1. 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  2. Otherwise negative CTA brain.  3. There is subarachnoid and intraventricular hemorrhage seen best on the  noncontrast CT scan of the brain.    Barriers to Discharge: Restarted methadone on 7/2 and started 3% saline on 7/2. Cardene drip on and off, currently off. Remains on 3% saline with Q4H Na+ levels.     NIH 0. Ox4. Follows commands. Afebrile. NSR. On room air. SLP/PT/OT. Telemetry, PICC, neuro checks, n/v checks, external urinary catheter, SCDs. Low stim guidelines. Intensivist and Neuro Intervention following. 3% saline @ 105 ml/hr, norvasc, asa, wellbutrin xl, colace, pepcid, sq heparin, lisinopril, atarax, methadone, medrol, movantik, nimotop, prn zofran, lyrica, senna, prn ultram. Na+ 139, wbc 14.6.     PCP: Kathy Buenrostro, APRN - CNP  Readmission Risk Score: 10.3    Patient Goals/Plan/Treatment Preferences: From home w/daughters. Uses a cane. Monitor therapy evals

## 2024-07-06 ENCOUNTER — APPOINTMENT (OUTPATIENT)
Dept: GENERAL RADIOLOGY | Age: 49
DRG: 030 | End: 2024-07-06
Payer: COMMERCIAL

## 2024-07-06 LAB
ANION GAP SERPL CALC-SCNC: 12 MEQ/L (ref 8–16)
ANION GAP SERPL CALC-SCNC: 5 MEQ/L (ref 8–16)
ANION GAP SERPL CALC-SCNC: 7 MEQ/L (ref 8–16)
ANION GAP SERPL CALC-SCNC: 8 MEQ/L (ref 8–16)
ANION GAP SERPL CALC-SCNC: 9 MEQ/L (ref 8–16)
BUN SERPL-MCNC: 10 MG/DL (ref 7–22)
BUN SERPL-MCNC: 7 MG/DL (ref 7–22)
BUN SERPL-MCNC: 8 MG/DL (ref 7–22)
BUN SERPL-MCNC: 8 MG/DL (ref 7–22)
BUN SERPL-MCNC: 9 MG/DL (ref 7–22)
CALCIUM SERPL-MCNC: 7.8 MG/DL (ref 8.5–10.5)
CALCIUM SERPL-MCNC: 7.8 MG/DL (ref 8.5–10.5)
CALCIUM SERPL-MCNC: 8.1 MG/DL (ref 8.5–10.5)
CHLORIDE SERPL-SCNC: 106 MEQ/L (ref 98–111)
CHLORIDE SERPL-SCNC: 110 MEQ/L (ref 98–111)
CHLORIDE SERPL-SCNC: 111 MEQ/L (ref 98–111)
CHLORIDE SERPL-SCNC: 113 MEQ/L (ref 98–111)
CHLORIDE SERPL-SCNC: 113 MEQ/L (ref 98–111)
CO2 SERPL-SCNC: 20 MEQ/L (ref 23–33)
CO2 SERPL-SCNC: 20 MEQ/L (ref 23–33)
CO2 SERPL-SCNC: 22 MEQ/L (ref 23–33)
CO2 SERPL-SCNC: 22 MEQ/L (ref 23–33)
CO2 SERPL-SCNC: 23 MEQ/L (ref 23–33)
CREAT SERPL-MCNC: 0.7 MG/DL (ref 0.4–1.2)
CREAT SERPL-MCNC: 0.7 MG/DL (ref 0.4–1.2)
CREAT SERPL-MCNC: 0.8 MG/DL (ref 0.4–1.2)
DEPRECATED RDW RBC AUTO: 46.7 FL (ref 35–45)
ERYTHROCYTE [DISTWIDTH] IN BLOOD BY AUTOMATED COUNT: 14.5 % (ref 11.5–14.5)
GFR SERPL CREATININE-BSD FRML MDRD: 90 ML/MIN/1.73M2
GFR SERPL CREATININE-BSD FRML MDRD: > 90 ML/MIN/1.73M2
GFR SERPL CREATININE-BSD FRML MDRD: > 90 ML/MIN/1.73M2
GLUCOSE SERPL-MCNC: 111 MG/DL (ref 70–108)
GLUCOSE SERPL-MCNC: 113 MG/DL (ref 70–108)
GLUCOSE SERPL-MCNC: 96 MG/DL (ref 70–108)
GLUCOSE SERPL-MCNC: 96 MG/DL (ref 70–108)
GLUCOSE SERPL-MCNC: 97 MG/DL (ref 70–108)
HCT VFR BLD AUTO: 35 % (ref 37–47)
HGB BLD-MCNC: 11 GM/DL (ref 12–16)
MAGNESIUM SERPL-MCNC: 1.6 MG/DL (ref 1.6–2.4)
MCH RBC QN AUTO: 28.1 PG (ref 26–33)
MCHC RBC AUTO-ENTMCNC: 31.4 GM/DL (ref 32.2–35.5)
MCV RBC AUTO: 89.3 FL (ref 81–99)
OSMOLALITY UR: 440 MOSMOL/KG (ref 250–750)
PLATELET # BLD AUTO: 220 THOU/MM3 (ref 130–400)
PMV BLD AUTO: 10.1 FL (ref 9.4–12.4)
POTASSIUM SERPL-SCNC: 3.4 MEQ/L (ref 3.5–5.2)
POTASSIUM SERPL-SCNC: 3.7 MEQ/L (ref 3.5–5.2)
POTASSIUM SERPL-SCNC: 3.9 MEQ/L (ref 3.5–5.2)
POTASSIUM SERPL-SCNC: 4 MEQ/L (ref 3.5–5.2)
POTASSIUM SERPL-SCNC: 4 MEQ/L (ref 3.5–5.2)
RBC # BLD AUTO: 3.92 MILL/MM3 (ref 4.2–5.4)
REASON FOR REJECTION: NORMAL
REJECTED TEST: NORMAL
SODIUM SERPL-SCNC: 139 MEQ/L (ref 135–145)
SODIUM SERPL-SCNC: 140 MEQ/L (ref 135–145)
SODIUM SERPL-SCNC: 140 MEQ/L (ref 135–145)
SODIUM SERPL-SCNC: 141 MEQ/L (ref 135–145)
SODIUM SERPL-SCNC: 141 MEQ/L (ref 135–145)
SODIUM UR-SCNC: 189 MEQ/L
WBC # BLD AUTO: 12.2 THOU/MM3 (ref 4.8–10.8)

## 2024-07-06 PROCEDURE — 6370000000 HC RX 637 (ALT 250 FOR IP)

## 2024-07-06 PROCEDURE — 84300 ASSAY OF URINE SODIUM: CPT

## 2024-07-06 PROCEDURE — 97530 THERAPEUTIC ACTIVITIES: CPT

## 2024-07-06 PROCEDURE — 6360000002 HC RX W HCPCS: Performed by: STUDENT IN AN ORGANIZED HEALTH CARE EDUCATION/TRAINING PROGRAM

## 2024-07-06 PROCEDURE — 6360000002 HC RX W HCPCS

## 2024-07-06 PROCEDURE — 6360000002 HC RX W HCPCS: Performed by: NURSE PRACTITIONER

## 2024-07-06 PROCEDURE — 80048 BASIC METABOLIC PNL TOTAL CA: CPT

## 2024-07-06 PROCEDURE — 6370000000 HC RX 637 (ALT 250 FOR IP): Performed by: PSYCHIATRY & NEUROLOGY

## 2024-07-06 PROCEDURE — 2000000000 HC ICU R&B

## 2024-07-06 PROCEDURE — 83735 ASSAY OF MAGNESIUM: CPT

## 2024-07-06 PROCEDURE — 2580000003 HC RX 258: Performed by: PSYCHIATRY & NEUROLOGY

## 2024-07-06 PROCEDURE — 36415 COLL VENOUS BLD VENIPUNCTURE: CPT

## 2024-07-06 PROCEDURE — 74018 RADEX ABDOMEN 1 VIEW: CPT

## 2024-07-06 PROCEDURE — 83930 ASSAY OF BLOOD OSMOLALITY: CPT

## 2024-07-06 PROCEDURE — 99233 SBSQ HOSP IP/OBS HIGH 50: CPT | Performed by: INTERNAL MEDICINE

## 2024-07-06 PROCEDURE — 97166 OT EVAL MOD COMPLEX 45 MIN: CPT

## 2024-07-06 PROCEDURE — 83935 ASSAY OF URINE OSMOLALITY: CPT

## 2024-07-06 PROCEDURE — 85027 COMPLETE CBC AUTOMATED: CPT

## 2024-07-06 PROCEDURE — 2580000003 HC RX 258

## 2024-07-06 PROCEDURE — 6370000000 HC RX 637 (ALT 250 FOR IP): Performed by: NURSE PRACTITIONER

## 2024-07-06 RX ORDER — MORPHINE SULFATE 2 MG/ML
2 INJECTION, SOLUTION INTRAMUSCULAR; INTRAVENOUS ONCE
Status: COMPLETED | OUTPATIENT
Start: 2024-07-06 | End: 2024-07-06

## 2024-07-06 RX ORDER — HYDRALAZINE HYDROCHLORIDE 20 MG/ML
10 INJECTION INTRAMUSCULAR; INTRAVENOUS EVERY 6 HOURS PRN
Status: DISCONTINUED | OUTPATIENT
Start: 2024-07-06 | End: 2024-07-07

## 2024-07-06 RX ORDER — MORPHINE SULFATE 2 MG/ML
INJECTION, SOLUTION INTRAMUSCULAR; INTRAVENOUS
Status: COMPLETED
Start: 2024-07-06 | End: 2024-07-06

## 2024-07-06 RX ADMIN — SODIUM CHLORIDE 115 ML/HR: 3 INJECTION, SOLUTION INTRAVENOUS at 14:16

## 2024-07-06 RX ADMIN — MORPHINE SULFATE 2 MG: 2 INJECTION, SOLUTION INTRAMUSCULAR; INTRAVENOUS at 07:53

## 2024-07-06 RX ADMIN — TRAMADOL HYDROCHLORIDE 50 MG: 50 TABLET ORAL at 18:55

## 2024-07-06 RX ADMIN — DOCUSATE SODIUM 100 MG: 100 CAPSULE, LIQUID FILLED ORAL at 08:54

## 2024-07-06 RX ADMIN — FAMOTIDINE 20 MG: 20 TABLET, FILM COATED ORAL at 08:54

## 2024-07-06 RX ADMIN — ONDANSETRON 4 MG: 2 INJECTION INTRAMUSCULAR; INTRAVENOUS at 18:34

## 2024-07-06 RX ADMIN — PREGABALIN 50 MG: 50 CAPSULE ORAL at 21:50

## 2024-07-06 RX ADMIN — TRAMADOL HYDROCHLORIDE 50 MG: 50 TABLET ORAL at 12:37

## 2024-07-06 RX ADMIN — LISINOPRIL 10 MG: 10 TABLET ORAL at 08:54

## 2024-07-06 RX ADMIN — NIMODIPINE 60 MG: 30 CAPSULE, LIQUID FILLED ORAL at 08:54

## 2024-07-06 RX ADMIN — AMLODIPINE BESYLATE 10 MG: 10 TABLET ORAL at 08:54

## 2024-07-06 RX ADMIN — HEPARIN SODIUM 5000 UNITS: 5000 INJECTION INTRAVENOUS; SUBCUTANEOUS at 14:17

## 2024-07-06 RX ADMIN — NIMODIPINE 60 MG: 30 CAPSULE, LIQUID FILLED ORAL at 16:55

## 2024-07-06 RX ADMIN — TRAMADOL HYDROCHLORIDE 50 MG: 50 TABLET ORAL at 06:38

## 2024-07-06 RX ADMIN — HEPARIN SODIUM 5000 UNITS: 5000 INJECTION INTRAVENOUS; SUBCUTANEOUS at 21:51

## 2024-07-06 RX ADMIN — ACETAMINOPHEN 650 MG: 325 TABLET ORAL at 18:55

## 2024-07-06 RX ADMIN — HEPARIN SODIUM 5000 UNITS: 5000 INJECTION INTRAVENOUS; SUBCUTANEOUS at 08:50

## 2024-07-06 RX ADMIN — NALOXEGOL OXALATE 12.5 MG: 12.5 TABLET, FILM COATED ORAL at 08:54

## 2024-07-06 RX ADMIN — PREGABALIN 50 MG: 50 CAPSULE ORAL at 08:54

## 2024-07-06 RX ADMIN — NIMODIPINE 60 MG: 30 CAPSULE, LIQUID FILLED ORAL at 11:38

## 2024-07-06 RX ADMIN — SODIUM CHLORIDE 115 ML/HR: 3 INJECTION, SOLUTION INTRAVENOUS at 09:09

## 2024-07-06 RX ADMIN — METHYLPREDNISOLONE 4 MG: 4 TABLET ORAL at 08:54

## 2024-07-06 RX ADMIN — HYDRALAZINE HYDROCHLORIDE 10 MG: 20 INJECTION INTRAMUSCULAR; INTRAVENOUS at 18:36

## 2024-07-06 RX ADMIN — SODIUM CHLORIDE 115 ML/HR: 3 INJECTION, SOLUTION INTRAVENOUS at 04:03

## 2024-07-06 RX ADMIN — METHADONE HYDROCHLORIDE 110 MG: 10 SOLUTION ORAL at 09:01

## 2024-07-06 RX ADMIN — NIMODIPINE 60 MG: 30 CAPSULE, LIQUID FILLED ORAL at 20:02

## 2024-07-06 RX ADMIN — ACETAMINOPHEN 650 MG: 325 TABLET ORAL at 06:42

## 2024-07-06 RX ADMIN — POTASSIUM CHLORIDE 20 MEQ: 29.8 INJECTION, SOLUTION INTRAVENOUS at 00:48

## 2024-07-06 RX ADMIN — ASPIRIN 81 MG 81 MG: 81 TABLET ORAL at 08:54

## 2024-07-06 RX ADMIN — FAMOTIDINE 20 MG: 20 TABLET, FILM COATED ORAL at 21:51

## 2024-07-06 RX ADMIN — SODIUM CHLORIDE, PRESERVATIVE FREE 10 ML: 5 INJECTION INTRAVENOUS at 08:54

## 2024-07-06 RX ADMIN — BUPROPION HYDROCHLORIDE 300 MG: 300 TABLET, EXTENDED RELEASE ORAL at 08:54

## 2024-07-06 RX ADMIN — HYDROXYZINE HYDROCHLORIDE 50 MG: 25 TABLET ORAL at 21:50

## 2024-07-06 RX ADMIN — ACETAMINOPHEN 650 MG: 325 TABLET ORAL at 12:37

## 2024-07-06 RX ADMIN — ONDANSETRON 4 MG: 2 INJECTION INTRAMUSCULAR; INTRAVENOUS at 07:28

## 2024-07-06 RX ADMIN — NIMODIPINE 60 MG: 30 CAPSULE, LIQUID FILLED ORAL at 03:57

## 2024-07-06 RX ADMIN — PREGABALIN 50 MG: 50 CAPSULE ORAL at 12:37

## 2024-07-06 RX ADMIN — SODIUM CHLORIDE 115 ML/HR: 3 INJECTION, SOLUTION INTRAVENOUS at 19:00

## 2024-07-06 ASSESSMENT — PAIN DESCRIPTION - DESCRIPTORS
DESCRIPTORS: ACHING
DESCRIPTORS: ACHING

## 2024-07-06 ASSESSMENT — PAIN SCALES - GENERAL
PAINLEVEL_OUTOF10: 8
PAINLEVEL_OUTOF10: 6
PAINLEVEL_OUTOF10: 3
PAINLEVEL_OUTOF10: 9
PAINLEVEL_OUTOF10: 3
PAINLEVEL_OUTOF10: 3
PAINLEVEL_OUTOF10: 7
PAINLEVEL_OUTOF10: 5
PAINLEVEL_OUTOF10: 4
PAINLEVEL_OUTOF10: 6
PAINLEVEL_OUTOF10: 8

## 2024-07-06 ASSESSMENT — PAIN DESCRIPTION - LOCATION
LOCATION: HEAD

## 2024-07-06 ASSESSMENT — PAIN - FUNCTIONAL ASSESSMENT: PAIN_FUNCTIONAL_ASSESSMENT: ACTIVITIES ARE NOT PREVENTED

## 2024-07-07 ENCOUNTER — APPOINTMENT (OUTPATIENT)
Dept: CT IMAGING | Age: 49
DRG: 030 | End: 2024-07-07
Payer: COMMERCIAL

## 2024-07-07 LAB
ANION GAP SERPL CALC-SCNC: 10 MEQ/L (ref 8–16)
ANION GAP SERPL CALC-SCNC: 11 MEQ/L (ref 8–16)
ANION GAP SERPL CALC-SCNC: 12 MEQ/L (ref 8–16)
ANION GAP SERPL CALC-SCNC: 13 MEQ/L (ref 8–16)
BUN SERPL-MCNC: 6 MG/DL (ref 7–22)
BUN SERPL-MCNC: 7 MG/DL (ref 7–22)
CA-I BLD ISE-SCNC: 0.85 MMOL/L (ref 1.12–1.32)
CALCIUM SERPL-MCNC: 8.3 MG/DL (ref 8.5–10.5)
CALCIUM SERPL-MCNC: 8.4 MG/DL (ref 8.5–10.5)
CALCIUM SERPL-MCNC: 8.5 MG/DL (ref 8.5–10.5)
CALCIUM SERPL-MCNC: 8.6 MG/DL (ref 8.5–10.5)
CALCIUM SERPL-MCNC: 8.6 MG/DL (ref 8.5–10.5)
CALCIUM SERPL-MCNC: 8.8 MG/DL (ref 8.5–10.5)
CHLORIDE SERPL-SCNC: 108 MEQ/L (ref 98–111)
CHLORIDE SERPL-SCNC: 109 MEQ/L (ref 98–111)
CHLORIDE SERPL-SCNC: 110 MEQ/L (ref 98–111)
CHLORIDE SERPL-SCNC: 110 MEQ/L (ref 98–111)
CO2 SERPL-SCNC: 21 MEQ/L (ref 23–33)
CO2 SERPL-SCNC: 21 MEQ/L (ref 23–33)
CO2 SERPL-SCNC: 22 MEQ/L (ref 23–33)
CO2 SERPL-SCNC: 23 MEQ/L (ref 23–33)
CREAT SERPL-MCNC: 0.6 MG/DL (ref 0.4–1.2)
CREAT SERPL-MCNC: 0.7 MG/DL (ref 0.4–1.2)
DEPRECATED RDW RBC AUTO: 46.5 FL (ref 35–45)
ERYTHROCYTE [DISTWIDTH] IN BLOOD BY AUTOMATED COUNT: 14.6 % (ref 11.5–14.5)
GFR SERPL CREATININE-BSD FRML MDRD: > 90 ML/MIN/1.73M2
GLUCOSE SERPL-MCNC: 106 MG/DL (ref 70–108)
GLUCOSE SERPL-MCNC: 107 MG/DL (ref 70–108)
GLUCOSE SERPL-MCNC: 107 MG/DL (ref 70–108)
GLUCOSE SERPL-MCNC: 115 MG/DL (ref 70–108)
GLUCOSE SERPL-MCNC: 126 MG/DL (ref 70–108)
GLUCOSE SERPL-MCNC: 91 MG/DL (ref 70–108)
HCT VFR BLD AUTO: 35.8 % (ref 37–47)
HGB BLD-MCNC: 11.6 GM/DL (ref 12–16)
MAGNESIUM SERPL-MCNC: 1.6 MG/DL (ref 1.6–2.4)
MCH RBC QN AUTO: 28.3 PG (ref 26–33)
MCHC RBC AUTO-ENTMCNC: 32.4 GM/DL (ref 32.2–35.5)
MCV RBC AUTO: 87.3 FL (ref 81–99)
PLATELET # BLD AUTO: 250 THOU/MM3 (ref 130–400)
PMV BLD AUTO: 10.3 FL (ref 9.4–12.4)
POTASSIUM SERPL-SCNC: 3.3 MEQ/L (ref 3.5–5.2)
POTASSIUM SERPL-SCNC: 3.3 MEQ/L (ref 3.5–5.2)
POTASSIUM SERPL-SCNC: 3.4 MEQ/L (ref 3.5–5.2)
POTASSIUM SERPL-SCNC: 3.5 MEQ/L (ref 3.5–5.2)
POTASSIUM SERPL-SCNC: 3.6 MEQ/L (ref 3.5–5.2)
POTASSIUM SERPL-SCNC: 3.8 MEQ/L (ref 3.5–5.2)
RBC # BLD AUTO: 4.1 MILL/MM3 (ref 4.2–5.4)
SODIUM SERPL-SCNC: 141 MEQ/L (ref 135–145)
SODIUM SERPL-SCNC: 141 MEQ/L (ref 135–145)
SODIUM SERPL-SCNC: 142 MEQ/L (ref 135–145)
SODIUM SERPL-SCNC: 143 MEQ/L (ref 135–145)
SODIUM SERPL-SCNC: 143 MEQ/L (ref 135–145)
SODIUM SERPL-SCNC: 144 MEQ/L (ref 135–145)
WBC # BLD AUTO: 17.1 THOU/MM3 (ref 4.8–10.8)

## 2024-07-07 PROCEDURE — 82330 ASSAY OF CALCIUM: CPT

## 2024-07-07 PROCEDURE — 6370000000 HC RX 637 (ALT 250 FOR IP): Performed by: PSYCHIATRY & NEUROLOGY

## 2024-07-07 PROCEDURE — 2580000003 HC RX 258: Performed by: NURSE PRACTITIONER

## 2024-07-07 PROCEDURE — 6360000002 HC RX W HCPCS

## 2024-07-07 PROCEDURE — 6370000000 HC RX 637 (ALT 250 FOR IP)

## 2024-07-07 PROCEDURE — 83735 ASSAY OF MAGNESIUM: CPT

## 2024-07-07 PROCEDURE — 2000000000 HC ICU R&B

## 2024-07-07 PROCEDURE — 80048 BASIC METABOLIC PNL TOTAL CA: CPT

## 2024-07-07 PROCEDURE — 2580000003 HC RX 258: Performed by: PSYCHIATRY & NEUROLOGY

## 2024-07-07 PROCEDURE — 6360000002 HC RX W HCPCS: Performed by: NURSE PRACTITIONER

## 2024-07-07 PROCEDURE — 36415 COLL VENOUS BLD VENIPUNCTURE: CPT

## 2024-07-07 PROCEDURE — 2500000003 HC RX 250 WO HCPCS: Performed by: NURSE PRACTITIONER

## 2024-07-07 PROCEDURE — 2580000003 HC RX 258

## 2024-07-07 PROCEDURE — 70450 CT HEAD/BRAIN W/O DYE: CPT

## 2024-07-07 PROCEDURE — 6370000000 HC RX 637 (ALT 250 FOR IP): Performed by: NURSE PRACTITIONER

## 2024-07-07 PROCEDURE — 99233 SBSQ HOSP IP/OBS HIGH 50: CPT | Performed by: INTERNAL MEDICINE

## 2024-07-07 PROCEDURE — 85027 COMPLETE CBC AUTOMATED: CPT

## 2024-07-07 PROCEDURE — 6360000002 HC RX W HCPCS: Performed by: STUDENT IN AN ORGANIZED HEALTH CARE EDUCATION/TRAINING PROGRAM

## 2024-07-07 PROCEDURE — 2500000003 HC RX 250 WO HCPCS

## 2024-07-07 RX ORDER — PROCHLORPERAZINE EDISYLATE 5 MG/ML
5 INJECTION INTRAMUSCULAR; INTRAVENOUS EVERY 6 HOURS PRN
Status: DISCONTINUED | OUTPATIENT
Start: 2024-07-07 | End: 2024-07-14 | Stop reason: HOSPADM

## 2024-07-07 RX ORDER — MORPHINE SULFATE 2 MG/ML
2 INJECTION, SOLUTION INTRAMUSCULAR; INTRAVENOUS ONCE
Status: COMPLETED | OUTPATIENT
Start: 2024-07-07 | End: 2024-07-07

## 2024-07-07 RX ORDER — METOPROLOL TARTRATE 1 MG/ML
5 INJECTION, SOLUTION INTRAVENOUS EVERY 6 HOURS PRN
Status: DISCONTINUED | OUTPATIENT
Start: 2024-07-07 | End: 2024-07-14 | Stop reason: HOSPADM

## 2024-07-07 RX ORDER — 3% SODIUM CHLORIDE 3 G/100ML
125 INJECTION, SOLUTION INTRAVENOUS CONTINUOUS
Status: DISPENSED | OUTPATIENT
Start: 2024-07-07 | End: 2024-07-08

## 2024-07-07 RX ORDER — CALCIUM GLUCONATE 20 MG/ML
2000 INJECTION, SOLUTION INTRAVENOUS PRN
Status: DISCONTINUED | OUTPATIENT
Start: 2024-07-07 | End: 2024-07-14

## 2024-07-07 RX ORDER — MORPHINE SULFATE 2 MG/ML
INJECTION, SOLUTION INTRAMUSCULAR; INTRAVENOUS
Status: DISCONTINUED
Start: 2024-07-07 | End: 2024-07-08

## 2024-07-07 RX ORDER — MORPHINE SULFATE 4 MG/ML
4 INJECTION, SOLUTION INTRAMUSCULAR; INTRAVENOUS
Status: DISCONTINUED | OUTPATIENT
Start: 2024-07-07 | End: 2024-07-08

## 2024-07-07 RX ORDER — MAGNESIUM SULFATE IN WATER 40 MG/ML
2000 INJECTION, SOLUTION INTRAVENOUS ONCE
Status: COMPLETED | OUTPATIENT
Start: 2024-07-07 | End: 2024-07-07

## 2024-07-07 RX ORDER — MORPHINE SULFATE 2 MG/ML
2 INJECTION, SOLUTION INTRAMUSCULAR; INTRAVENOUS
Status: DISCONTINUED | OUTPATIENT
Start: 2024-07-07 | End: 2024-07-08

## 2024-07-07 RX ORDER — TRAMADOL HYDROCHLORIDE 50 MG/1
50 TABLET ORAL EVERY 4 HOURS PRN
Status: DISCONTINUED | OUTPATIENT
Start: 2024-07-07 | End: 2024-07-07

## 2024-07-07 RX ADMIN — MAGNESIUM SULFATE HEPTAHYDRATE 2000 MG: 40 INJECTION, SOLUTION INTRAVENOUS at 21:05

## 2024-07-07 RX ADMIN — HYDRALAZINE HYDROCHLORIDE 10 MG: 20 INJECTION INTRAMUSCULAR; INTRAVENOUS at 19:57

## 2024-07-07 RX ADMIN — SODIUM CHLORIDE 115 ML/HR: 3 INJECTION, SOLUTION INTRAVENOUS at 09:47

## 2024-07-07 RX ADMIN — HEPARIN SODIUM 5000 UNITS: 5000 INJECTION INTRAVENOUS; SUBCUTANEOUS at 14:39

## 2024-07-07 RX ADMIN — SODIUM CHLORIDE, PRESERVATIVE FREE 10 ML: 5 INJECTION INTRAVENOUS at 08:17

## 2024-07-07 RX ADMIN — SODIUM CHLORIDE 115 ML/HR: 3 INJECTION, SOLUTION INTRAVENOUS at 00:05

## 2024-07-07 RX ADMIN — PREGABALIN 50 MG: 50 CAPSULE ORAL at 22:53

## 2024-07-07 RX ADMIN — TRAMADOL HYDROCHLORIDE 50 MG: 50 TABLET ORAL at 05:23

## 2024-07-07 RX ADMIN — ACETAMINOPHEN 650 MG: 325 TABLET ORAL at 05:23

## 2024-07-07 RX ADMIN — MORPHINE SULFATE 4 MG: 4 INJECTION, SOLUTION INTRAMUSCULAR; INTRAVENOUS at 22:51

## 2024-07-07 RX ADMIN — TRAMADOL HYDROCHLORIDE 50 MG: 50 TABLET ORAL at 18:39

## 2024-07-07 RX ADMIN — TRAMADOL HYDROCHLORIDE 50 MG: 50 TABLET ORAL at 14:38

## 2024-07-07 RX ADMIN — MORPHINE SULFATE 2 MG: 2 INJECTION, SOLUTION INTRAMUSCULAR; INTRAVENOUS at 19:43

## 2024-07-07 RX ADMIN — ONDANSETRON 4 MG: 2 INJECTION INTRAMUSCULAR; INTRAVENOUS at 19:13

## 2024-07-07 RX ADMIN — HYDROXYZINE HYDROCHLORIDE 50 MG: 25 TABLET ORAL at 23:46

## 2024-07-07 RX ADMIN — NIMODIPINE 60 MG: 30 CAPSULE, LIQUID FILLED ORAL at 20:16

## 2024-07-07 RX ADMIN — PREGABALIN 50 MG: 50 CAPSULE ORAL at 14:38

## 2024-07-07 RX ADMIN — NIMODIPINE 60 MG: 30 CAPSULE, LIQUID FILLED ORAL at 16:16

## 2024-07-07 RX ADMIN — ACETAMINOPHEN 650 MG: 325 TABLET ORAL at 18:40

## 2024-07-07 RX ADMIN — METOPROLOL TARTRATE 5 MG: 5 INJECTION INTRAVENOUS at 23:47

## 2024-07-07 RX ADMIN — ASPIRIN 81 MG 81 MG: 81 TABLET ORAL at 08:16

## 2024-07-07 RX ADMIN — AMLODIPINE BESYLATE 10 MG: 10 TABLET ORAL at 08:16

## 2024-07-07 RX ADMIN — HEPARIN SODIUM 5000 UNITS: 5000 INJECTION INTRAVENOUS; SUBCUTANEOUS at 05:24

## 2024-07-07 RX ADMIN — SODIUM CHLORIDE 115 ML/HR: 3 INJECTION, SOLUTION INTRAVENOUS at 14:45

## 2024-07-07 RX ADMIN — POTASSIUM CHLORIDE 20 MEQ: 29.8 INJECTION, SOLUTION INTRAVENOUS at 04:56

## 2024-07-07 RX ADMIN — NIMODIPINE 60 MG: 30 CAPSULE, LIQUID FILLED ORAL at 00:09

## 2024-07-07 RX ADMIN — HEPARIN SODIUM 5000 UNITS: 5000 INJECTION INTRAVENOUS; SUBCUTANEOUS at 22:52

## 2024-07-07 RX ADMIN — PREGABALIN 50 MG: 50 CAPSULE ORAL at 08:15

## 2024-07-07 RX ADMIN — NIMODIPINE 60 MG: 30 CAPSULE, LIQUID FILLED ORAL at 23:47

## 2024-07-07 RX ADMIN — POTASSIUM CHLORIDE 20 MEQ: 29.8 INJECTION, SOLUTION INTRAVENOUS at 03:56

## 2024-07-07 RX ADMIN — TRAMADOL HYDROCHLORIDE 50 MG: 50 TABLET ORAL at 09:57

## 2024-07-07 RX ADMIN — DOCUSATE SODIUM 100 MG: 100 CAPSULE, LIQUID FILLED ORAL at 22:54

## 2024-07-07 RX ADMIN — NIMODIPINE 60 MG: 30 CAPSULE, LIQUID FILLED ORAL at 12:27

## 2024-07-07 RX ADMIN — FAMOTIDINE 20 MG: 20 TABLET, FILM COATED ORAL at 08:16

## 2024-07-07 RX ADMIN — HYDRALAZINE HYDROCHLORIDE 10 MG: 20 INJECTION INTRAMUSCULAR; INTRAVENOUS at 00:08

## 2024-07-07 RX ADMIN — BUPROPION HYDROCHLORIDE 300 MG: 300 TABLET, EXTENDED RELEASE ORAL at 08:16

## 2024-07-07 RX ADMIN — TRAMADOL HYDROCHLORIDE 50 MG: 50 TABLET ORAL at 00:09

## 2024-07-07 RX ADMIN — CALCIUM GLUCONATE 2000 MG: 20 INJECTION, SOLUTION INTRAVENOUS at 09:54

## 2024-07-07 RX ADMIN — FAMOTIDINE 20 MG: 20 TABLET, FILM COATED ORAL at 22:53

## 2024-07-07 RX ADMIN — ACETAMINOPHEN 650 MG: 325 TABLET ORAL at 09:56

## 2024-07-07 RX ADMIN — ACETAMINOPHEN 650 MG: 325 TABLET ORAL at 00:08

## 2024-07-07 RX ADMIN — NIMODIPINE 60 MG: 30 CAPSULE, LIQUID FILLED ORAL at 08:16

## 2024-07-07 RX ADMIN — NIMODIPINE 60 MG: 30 CAPSULE, LIQUID FILLED ORAL at 03:54

## 2024-07-07 RX ADMIN — METHADONE HYDROCHLORIDE 110 MG: 10 SOLUTION ORAL at 08:17

## 2024-07-07 RX ADMIN — SODIUM CHLORIDE, PRESERVATIVE FREE 10 ML: 5 INJECTION INTRAVENOUS at 22:54

## 2024-07-07 RX ADMIN — ACETAMINOPHEN 650 MG: 325 TABLET ORAL at 14:38

## 2024-07-07 RX ADMIN — SODIUM CHLORIDE 115 ML/HR: 3 INJECTION, SOLUTION INTRAVENOUS at 04:54

## 2024-07-07 RX ADMIN — LISINOPRIL 10 MG: 10 TABLET ORAL at 08:15

## 2024-07-07 RX ADMIN — SODIUM CHLORIDE 115 ML/HR: 3 INJECTION, SOLUTION INTRAVENOUS at 19:50

## 2024-07-07 ASSESSMENT — PAIN DESCRIPTION - DESCRIPTORS
DESCRIPTORS: ACHING
DESCRIPTORS: POUNDING
DESCRIPTORS: ACHING;THROBBING
DESCRIPTORS: ACHING;THROBBING

## 2024-07-07 ASSESSMENT — PAIN SCALES - GENERAL
PAINLEVEL_OUTOF10: 6
PAINLEVEL_OUTOF10: 9
PAINLEVEL_OUTOF10: 9
PAINLEVEL_OUTOF10: 8
PAINLEVEL_OUTOF10: 8
PAINLEVEL_OUTOF10: 9
PAINLEVEL_OUTOF10: 8
PAINLEVEL_OUTOF10: 10

## 2024-07-07 ASSESSMENT — PAIN DESCRIPTION - FREQUENCY: FREQUENCY: CONTINUOUS

## 2024-07-07 ASSESSMENT — PAIN - FUNCTIONAL ASSESSMENT
PAIN_FUNCTIONAL_ASSESSMENT: ACTIVITIES ARE NOT PREVENTED
PAIN_FUNCTIONAL_ASSESSMENT: PREVENTS OR INTERFERES SOME ACTIVE ACTIVITIES AND ADLS
PAIN_FUNCTIONAL_ASSESSMENT: ACTIVITIES ARE NOT PREVENTED
PAIN_FUNCTIONAL_ASSESSMENT: PREVENTS OR INTERFERES SOME ACTIVE ACTIVITIES AND ADLS

## 2024-07-07 ASSESSMENT — PAIN DESCRIPTION - ONSET: ONSET: ON-GOING

## 2024-07-07 ASSESSMENT — PAIN DESCRIPTION - LOCATION
LOCATION: HEAD

## 2024-07-07 ASSESSMENT — PAIN DESCRIPTION - PAIN TYPE: TYPE: ACUTE PAIN

## 2024-07-08 ENCOUNTER — APPOINTMENT (OUTPATIENT)
Dept: GENERAL RADIOLOGY | Age: 49
DRG: 030 | End: 2024-07-08
Payer: COMMERCIAL

## 2024-07-08 ENCOUNTER — APPOINTMENT (OUTPATIENT)
Dept: INTERVENTIONAL RADIOLOGY/VASCULAR | Age: 49
DRG: 030 | End: 2024-07-08
Payer: COMMERCIAL

## 2024-07-08 LAB
ANION GAP SERPL CALC-SCNC: 10 MEQ/L (ref 8–16)
ANION GAP SERPL CALC-SCNC: 10 MEQ/L (ref 8–16)
ANION GAP SERPL CALC-SCNC: 12 MEQ/L (ref 8–16)
ANION GAP SERPL CALC-SCNC: 13 MEQ/L (ref 8–16)
ANION GAP SERPL CALC-SCNC: 14 MEQ/L (ref 8–16)
ANION GAP SERPL CALC-SCNC: 15 MEQ/L (ref 8–16)
BUN SERPL-MCNC: 7 MG/DL (ref 7–22)
BUN SERPL-MCNC: 8 MG/DL (ref 7–22)
BUN SERPL-MCNC: 8 MG/DL (ref 7–22)
CA-I BLD ISE-SCNC: 1.24 MMOL/L (ref 1.12–1.32)
CALCIUM SERPL-MCNC: 8.4 MG/DL (ref 8.5–10.5)
CALCIUM SERPL-MCNC: 8.6 MG/DL (ref 8.5–10.5)
CALCIUM SERPL-MCNC: 8.7 MG/DL (ref 8.5–10.5)
CALCIUM SERPL-MCNC: 8.8 MG/DL (ref 8.5–10.5)
CALCIUM SERPL-MCNC: 8.8 MG/DL (ref 8.5–10.5)
CALCIUM SERPL-MCNC: 8.9 MG/DL (ref 8.5–10.5)
CHLORIDE SERPL-SCNC: 102 MEQ/L (ref 98–111)
CHLORIDE SERPL-SCNC: 103 MEQ/L (ref 98–111)
CHLORIDE SERPL-SCNC: 103 MEQ/L (ref 98–111)
CHLORIDE SERPL-SCNC: 104 MEQ/L (ref 98–111)
CHLORIDE SERPL-SCNC: 105 MEQ/L (ref 98–111)
CHLORIDE SERPL-SCNC: 114 MEQ/L (ref 98–111)
CO2 SERPL-SCNC: 21 MEQ/L (ref 23–33)
CO2 SERPL-SCNC: 22 MEQ/L (ref 23–33)
CO2 SERPL-SCNC: 22 MEQ/L (ref 23–33)
CO2 SERPL-SCNC: 23 MEQ/L (ref 23–33)
CO2 SERPL-SCNC: 25 MEQ/L (ref 23–33)
CO2 SERPL-SCNC: 26 MEQ/L (ref 23–33)
CREAT SERPL-MCNC: 0.5 MG/DL (ref 0.4–1.2)
CREAT SERPL-MCNC: 0.6 MG/DL (ref 0.4–1.2)
DEPRECATED RDW RBC AUTO: 43.8 FL (ref 35–45)
DEPRECATED RDW RBC AUTO: 45.7 FL (ref 35–45)
ECHO BSA: 1.84 M2
ERYTHROCYTE [DISTWIDTH] IN BLOOD BY AUTOMATED COUNT: 14.2 % (ref 11.5–14.5)
ERYTHROCYTE [DISTWIDTH] IN BLOOD BY AUTOMATED COUNT: 14.3 % (ref 11.5–14.5)
GFR SERPL CREATININE-BSD FRML MDRD: > 90 ML/MIN/1.73M2
GLUCOSE SERPL-MCNC: 103 MG/DL (ref 70–108)
GLUCOSE SERPL-MCNC: 108 MG/DL (ref 70–108)
GLUCOSE SERPL-MCNC: 116 MG/DL (ref 70–108)
GLUCOSE SERPL-MCNC: 121 MG/DL (ref 70–108)
GLUCOSE SERPL-MCNC: 122 MG/DL (ref 70–108)
GLUCOSE SERPL-MCNC: 128 MG/DL (ref 70–108)
HCT VFR BLD AUTO: 27.9 % (ref 37–47)
HCT VFR BLD AUTO: 37.2 % (ref 37–47)
HGB BLD-MCNC: 12.4 GM/DL (ref 12–16)
HGB BLD-MCNC: 9.2 GM/DL (ref 12–16)
MCH RBC QN AUTO: 28.4 PG (ref 26–33)
MCH RBC QN AUTO: 28.8 PG (ref 26–33)
MCHC RBC AUTO-ENTMCNC: 33 GM/DL (ref 32.2–35.5)
MCHC RBC AUTO-ENTMCNC: 33.3 GM/DL (ref 32.2–35.5)
MCV RBC AUTO: 85.1 FL (ref 81–99)
MCV RBC AUTO: 87.5 FL (ref 81–99)
OSMOLALITY SERPL: 298 MOSMOL/KG (ref 275–295)
PLATELET # BLD AUTO: 214 THOU/MM3 (ref 130–400)
PLATELET # BLD AUTO: 275 THOU/MM3 (ref 130–400)
PMV BLD AUTO: 10 FL (ref 9.4–12.4)
PMV BLD AUTO: 10 FL (ref 9.4–12.4)
POTASSIUM SERPL-SCNC: 3.3 MEQ/L (ref 3.5–5.2)
POTASSIUM SERPL-SCNC: 3.3 MEQ/L (ref 3.5–5.2)
POTASSIUM SERPL-SCNC: 3.6 MEQ/L (ref 3.5–5.2)
POTASSIUM SERPL-SCNC: 3.6 MEQ/L (ref 3.5–5.2)
POTASSIUM SERPL-SCNC: 3.7 MEQ/L (ref 3.5–5.2)
POTASSIUM SERPL-SCNC: 4.2 MEQ/L (ref 3.5–5.2)
RBC # BLD AUTO: 3.19 MILL/MM3 (ref 4.2–5.4)
RBC # BLD AUTO: 4.37 MILL/MM3 (ref 4.2–5.4)
SODIUM SERPL-SCNC: 136 MEQ/L (ref 135–145)
SODIUM SERPL-SCNC: 139 MEQ/L (ref 135–145)
SODIUM SERPL-SCNC: 140 MEQ/L (ref 135–145)
SODIUM SERPL-SCNC: 140 MEQ/L (ref 135–145)
SODIUM SERPL-SCNC: 141 MEQ/L (ref 135–145)
SODIUM SERPL-SCNC: 148 MEQ/L (ref 135–145)
WBC # BLD AUTO: 13.6 THOU/MM3 (ref 4.8–10.8)
WBC # BLD AUTO: 17.8 THOU/MM3 (ref 4.8–10.8)

## 2024-07-08 PROCEDURE — 6360000002 HC RX W HCPCS

## 2024-07-08 PROCEDURE — 99291 CRITICAL CARE FIRST HOUR: CPT | Performed by: INTERNAL MEDICINE

## 2024-07-08 PROCEDURE — 82330 ASSAY OF CALCIUM: CPT

## 2024-07-08 PROCEDURE — 36415 COLL VENOUS BLD VENIPUNCTURE: CPT

## 2024-07-08 PROCEDURE — 99232 SBSQ HOSP IP/OBS MODERATE 35: CPT | Performed by: NURSE PRACTITIONER

## 2024-07-08 PROCEDURE — 2580000003 HC RX 258: Performed by: PSYCHIATRY & NEUROLOGY

## 2024-07-08 PROCEDURE — 6360000002 HC RX W HCPCS: Performed by: STUDENT IN AN ORGANIZED HEALTH CARE EDUCATION/TRAINING PROGRAM

## 2024-07-08 PROCEDURE — 93970 EXTREMITY STUDY: CPT

## 2024-07-08 PROCEDURE — 2580000003 HC RX 258

## 2024-07-08 PROCEDURE — 80048 BASIC METABOLIC PNL TOTAL CA: CPT

## 2024-07-08 PROCEDURE — 6370000000 HC RX 637 (ALT 250 FOR IP): Performed by: NURSE PRACTITIONER

## 2024-07-08 PROCEDURE — 6370000000 HC RX 637 (ALT 250 FOR IP)

## 2024-07-08 PROCEDURE — 71045 X-RAY EXAM CHEST 1 VIEW: CPT

## 2024-07-08 PROCEDURE — 2000000000 HC ICU R&B

## 2024-07-08 PROCEDURE — 6360000002 HC RX W HCPCS: Performed by: NURSE PRACTITIONER

## 2024-07-08 PROCEDURE — 2580000003 HC RX 258: Performed by: NURSE PRACTITIONER

## 2024-07-08 PROCEDURE — 6370000000 HC RX 637 (ALT 250 FOR IP): Performed by: PSYCHIATRY & NEUROLOGY

## 2024-07-08 PROCEDURE — P9047 ALBUMIN (HUMAN), 25%, 50ML: HCPCS | Performed by: NURSE PRACTITIONER

## 2024-07-08 PROCEDURE — 94761 N-INVAS EAR/PLS OXIMETRY MLT: CPT

## 2024-07-08 PROCEDURE — 85027 COMPLETE CBC AUTOMATED: CPT

## 2024-07-08 RX ORDER — PREGABALIN 75 MG/1
75 CAPSULE ORAL 3 TIMES DAILY
Status: DISCONTINUED | OUTPATIENT
Start: 2024-07-08 | End: 2024-07-14 | Stop reason: HOSPADM

## 2024-07-08 RX ORDER — DOCUSATE SODIUM 100 MG/1
200 CAPSULE, LIQUID FILLED ORAL 2 TIMES DAILY
Status: DISCONTINUED | OUTPATIENT
Start: 2024-07-08 | End: 2024-07-10

## 2024-07-08 RX ORDER — 3% SODIUM CHLORIDE 3 G/100ML
120 INJECTION, SOLUTION INTRAVENOUS CONTINUOUS
Status: DISCONTINUED | OUTPATIENT
Start: 2024-07-08 | End: 2024-07-09

## 2024-07-08 RX ORDER — 3% SODIUM CHLORIDE 3 G/100ML
100 INJECTION, SOLUTION INTRAVENOUS CONTINUOUS
Status: DISCONTINUED | OUTPATIENT
Start: 2024-07-08 | End: 2024-07-08

## 2024-07-08 RX ORDER — HYDRALAZINE HYDROCHLORIDE 25 MG/1
25 TABLET, FILM COATED ORAL EVERY 8 HOURS PRN
Status: DISCONTINUED | OUTPATIENT
Start: 2024-07-08 | End: 2024-07-14 | Stop reason: HOSPADM

## 2024-07-08 RX ORDER — HYDROXYZINE HYDROCHLORIDE 50 MG/ML
25 INJECTION, SOLUTION INTRAMUSCULAR EVERY 6 HOURS PRN
Status: DISCONTINUED | OUTPATIENT
Start: 2024-07-08 | End: 2024-07-14 | Stop reason: HOSPADM

## 2024-07-08 RX ORDER — ALBUMIN (HUMAN) 12.5 G/50ML
25 SOLUTION INTRAVENOUS EVERY 8 HOURS
Status: COMPLETED | OUTPATIENT
Start: 2024-07-08 | End: 2024-07-10

## 2024-07-08 RX ADMIN — HEPARIN SODIUM 5000 UNITS: 5000 INJECTION INTRAVENOUS; SUBCUTANEOUS at 20:31

## 2024-07-08 RX ADMIN — SODIUM CHLORIDE 100 ML/HR: 3 INJECTION, SOLUTION INTRAVENOUS at 20:45

## 2024-07-08 RX ADMIN — PREGABALIN 75 MG: 75 CAPSULE ORAL at 20:30

## 2024-07-08 RX ADMIN — SODIUM CHLORIDE 125 ML/HR: 3 INJECTION, SOLUTION INTRAVENOUS at 10:54

## 2024-07-08 RX ADMIN — POTASSIUM CHLORIDE 20 MEQ: 29.8 INJECTION, SOLUTION INTRAVENOUS at 15:13

## 2024-07-08 RX ADMIN — NALOXEGOL OXALATE 12.5 MG: 12.5 TABLET, FILM COATED ORAL at 10:05

## 2024-07-08 RX ADMIN — MORPHINE SULFATE 4 MG: 4 INJECTION, SOLUTION INTRAMUSCULAR; INTRAVENOUS at 05:50

## 2024-07-08 RX ADMIN — FAMOTIDINE 20 MG: 20 TABLET, FILM COATED ORAL at 20:30

## 2024-07-08 RX ADMIN — SODIUM CHLORIDE 2.5 MG/HR: 9 INJECTION, SOLUTION INTRAVENOUS at 03:56

## 2024-07-08 RX ADMIN — ALBUMIN (HUMAN) 25 G: 0.25 INJECTION, SOLUTION INTRAVENOUS at 14:21

## 2024-07-08 RX ADMIN — DOCUSATE SODIUM 100 MG: 100 CAPSULE, LIQUID FILLED ORAL at 10:06

## 2024-07-08 RX ADMIN — ONDANSETRON 4 MG: 2 INJECTION INTRAMUSCULAR; INTRAVENOUS at 10:13

## 2024-07-08 RX ADMIN — FAMOTIDINE 20 MG: 20 TABLET, FILM COATED ORAL at 09:40

## 2024-07-08 RX ADMIN — NIMODIPINE 60 MG: 30 CAPSULE, LIQUID FILLED ORAL at 12:05

## 2024-07-08 RX ADMIN — LISINOPRIL 10 MG: 10 TABLET ORAL at 10:05

## 2024-07-08 RX ADMIN — SODIUM CHLORIDE, PRESERVATIVE FREE 20 ML: 5 INJECTION INTRAVENOUS at 09:10

## 2024-07-08 RX ADMIN — METHADONE HYDROCHLORIDE 110 MG: 10 SOLUTION ORAL at 10:07

## 2024-07-08 RX ADMIN — NIMODIPINE 60 MG: 30 CAPSULE, LIQUID FILLED ORAL at 23:45

## 2024-07-08 RX ADMIN — AMLODIPINE BESYLATE 10 MG: 10 TABLET ORAL at 10:06

## 2024-07-08 RX ADMIN — POTASSIUM CHLORIDE 20 MEQ: 29.8 INJECTION, SOLUTION INTRAVENOUS at 03:04

## 2024-07-08 RX ADMIN — ONDANSETRON 4 MG: 2 INJECTION INTRAMUSCULAR; INTRAVENOUS at 20:37

## 2024-07-08 RX ADMIN — NIMODIPINE 60 MG: 30 CAPSULE, LIQUID FILLED ORAL at 08:44

## 2024-07-08 RX ADMIN — MORPHINE SULFATE 4 MG: 4 INJECTION, SOLUTION INTRAMUSCULAR; INTRAVENOUS at 03:26

## 2024-07-08 RX ADMIN — NIMODIPINE 60 MG: 30 CAPSULE, LIQUID FILLED ORAL at 20:30

## 2024-07-08 RX ADMIN — ACETAMINOPHEN 650 MG: 325 TABLET ORAL at 12:05

## 2024-07-08 RX ADMIN — MORPHINE SULFATE 4 MG: 4 INJECTION, SOLUTION INTRAMUSCULAR; INTRAVENOUS at 09:40

## 2024-07-08 RX ADMIN — MORPHINE SULFATE 4 MG: 4 INJECTION, SOLUTION INTRAMUSCULAR; INTRAVENOUS at 01:25

## 2024-07-08 RX ADMIN — ACETAMINOPHEN 650 MG: 325 TABLET ORAL at 20:30

## 2024-07-08 RX ADMIN — ASPIRIN 81 MG 81 MG: 81 TABLET ORAL at 09:40

## 2024-07-08 RX ADMIN — BUPROPION HYDROCHLORIDE 300 MG: 300 TABLET, EXTENDED RELEASE ORAL at 10:06

## 2024-07-08 RX ADMIN — PREGABALIN 50 MG: 50 CAPSULE ORAL at 09:40

## 2024-07-08 RX ADMIN — SODIUM CHLORIDE, PRESERVATIVE FREE 10 ML: 5 INJECTION INTRAVENOUS at 20:31

## 2024-07-08 RX ADMIN — SODIUM CHLORIDE 115 ML/HR: 3 INJECTION, SOLUTION INTRAVENOUS at 05:26

## 2024-07-08 RX ADMIN — ALBUMIN (HUMAN) 25 G: 0.25 INJECTION, SOLUTION INTRAVENOUS at 20:50

## 2024-07-08 RX ADMIN — NIMODIPINE 60 MG: 30 CAPSULE, LIQUID FILLED ORAL at 16:52

## 2024-07-08 RX ADMIN — PREGABALIN 75 MG: 75 CAPSULE ORAL at 14:43

## 2024-07-08 RX ADMIN — POTASSIUM CHLORIDE 20 MEQ: 29.8 INJECTION, SOLUTION INTRAVENOUS at 01:27

## 2024-07-08 RX ADMIN — MAGNESIUM HYDROXIDE 30 ML: 1200 LIQUID ORAL at 14:43

## 2024-07-08 RX ADMIN — HYDROXYZINE HYDROCHLORIDE 50 MG: 25 TABLET ORAL at 20:55

## 2024-07-08 RX ADMIN — SODIUM CHLORIDE 115 ML/HR: 3 INJECTION, SOLUTION INTRAVENOUS at 00:42

## 2024-07-08 RX ADMIN — POTASSIUM CHLORIDE 20 MEQ: 29.8 INJECTION, SOLUTION INTRAVENOUS at 14:16

## 2024-07-08 RX ADMIN — HEPARIN SODIUM 5000 UNITS: 5000 INJECTION INTRAVENOUS; SUBCUTANEOUS at 14:43

## 2024-07-08 RX ADMIN — SODIUM CHLORIDE 125 ML/HR: 3 INJECTION, SOLUTION INTRAVENOUS at 15:16

## 2024-07-08 ASSESSMENT — PAIN DESCRIPTION - PAIN TYPE: TYPE: ACUTE PAIN

## 2024-07-08 ASSESSMENT — PAIN SCALES - GENERAL
PAINLEVEL_OUTOF10: 9
PAINLEVEL_OUTOF10: 8
PAINLEVEL_OUTOF10: 8
PAINLEVEL_OUTOF10: 7
PAINLEVEL_OUTOF10: 9
PAINLEVEL_OUTOF10: 8
PAINLEVEL_OUTOF10: 10
PAINLEVEL_OUTOF10: 5

## 2024-07-08 ASSESSMENT — PAIN DESCRIPTION - LOCATION
LOCATION: HEAD

## 2024-07-08 ASSESSMENT — PAIN DESCRIPTION - DESCRIPTORS
DESCRIPTORS: ACHING

## 2024-07-08 ASSESSMENT — PAIN DESCRIPTION - FREQUENCY: FREQUENCY: CONTINUOUS

## 2024-07-08 ASSESSMENT — PAIN DESCRIPTION - ORIENTATION
ORIENTATION: POSTERIOR

## 2024-07-08 ASSESSMENT — PAIN DESCRIPTION - ONSET: ONSET: ON-GOING

## 2024-07-09 LAB
ANION GAP SERPL CALC-SCNC: 10 MEQ/L (ref 8–16)
ANION GAP SERPL CALC-SCNC: 11 MEQ/L (ref 8–16)
ANION GAP SERPL CALC-SCNC: 12 MEQ/L (ref 8–16)
BUN SERPL-MCNC: 6 MG/DL (ref 7–22)
BUN SERPL-MCNC: 7 MG/DL (ref 7–22)
BUN SERPL-MCNC: 8 MG/DL (ref 7–22)
CALCIUM SERPL-MCNC: 8.2 MG/DL (ref 8.5–10.5)
CALCIUM SERPL-MCNC: 8.5 MG/DL (ref 8.5–10.5)
CALCIUM SERPL-MCNC: 8.5 MG/DL (ref 8.5–10.5)
CALCIUM SERPL-MCNC: 8.6 MG/DL (ref 8.5–10.5)
CALCIUM SERPL-MCNC: 8.7 MG/DL (ref 8.5–10.5)
CALCIUM SERPL-MCNC: 8.7 MG/DL (ref 8.5–10.5)
CHLORIDE SERPL-SCNC: 105 MEQ/L (ref 98–111)
CHLORIDE SERPL-SCNC: 105 MEQ/L (ref 98–111)
CHLORIDE SERPL-SCNC: 106 MEQ/L (ref 98–111)
CHLORIDE SERPL-SCNC: 107 MEQ/L (ref 98–111)
CHLORIDE SERPL-SCNC: 108 MEQ/L (ref 98–111)
CHLORIDE SERPL-SCNC: 108 MEQ/L (ref 98–111)
CO2 SERPL-SCNC: 21 MEQ/L (ref 23–33)
CO2 SERPL-SCNC: 21 MEQ/L (ref 23–33)
CO2 SERPL-SCNC: 22 MEQ/L (ref 23–33)
CO2 SERPL-SCNC: 22 MEQ/L (ref 23–33)
CO2 SERPL-SCNC: 23 MEQ/L (ref 23–33)
CO2 SERPL-SCNC: 23 MEQ/L (ref 23–33)
CREAT SERPL-MCNC: 0.6 MG/DL (ref 0.4–1.2)
CREAT SERPL-MCNC: 0.7 MG/DL (ref 0.4–1.2)
DEPRECATED RDW RBC AUTO: 48.8 FL (ref 35–45)
DEPRECATED RDW RBC AUTO: NORMAL FL (ref 35–45)
ERYTHROCYTE [DISTWIDTH] IN BLOOD BY AUTOMATED COUNT: 14.7 % (ref 11.5–14.5)
ERYTHROCYTE [DISTWIDTH] IN BLOOD BY AUTOMATED COUNT: NORMAL % (ref 11.5–14.5)
GFR SERPL CREATININE-BSD FRML MDRD: > 90 ML/MIN/1.73M2
GLUCOSE SERPL-MCNC: 100 MG/DL (ref 70–108)
GLUCOSE SERPL-MCNC: 101 MG/DL (ref 70–108)
GLUCOSE SERPL-MCNC: 103 MG/DL (ref 70–108)
GLUCOSE SERPL-MCNC: 106 MG/DL (ref 70–108)
GLUCOSE SERPL-MCNC: 111 MG/DL (ref 70–108)
GLUCOSE SERPL-MCNC: 93 MG/DL (ref 70–108)
HCT VFR BLD AUTO: 33.1 % (ref 37–47)
HCT VFR BLD AUTO: NORMAL % (ref 37–47)
HGB BLD-MCNC: 10.4 GM/DL (ref 12–16)
HGB BLD-MCNC: NORMAL GM/DL (ref 12–16)
MAGNESIUM SERPL-MCNC: 1.7 MG/DL (ref 1.6–2.4)
MAGNESIUM SERPL-MCNC: 1.8 MG/DL (ref 1.6–2.4)
MCH RBC QN AUTO: 28.5 PG (ref 26–33)
MCH RBC QN AUTO: NORMAL PG (ref 26–33)
MCHC RBC AUTO-ENTMCNC: 31.4 GM/DL (ref 32.2–35.5)
MCHC RBC AUTO-ENTMCNC: NORMAL GM/DL (ref 32.2–35.5)
MCV RBC AUTO: 90.7 FL (ref 81–99)
MCV RBC AUTO: NORMAL FL (ref 81–99)
PLATELET # BLD AUTO: 244 THOU/MM3 (ref 130–400)
PLATELET # BLD AUTO: NORMAL THOU/MM3 (ref 130–400)
PMV BLD AUTO: 9.9 FL (ref 9.4–12.4)
PMV BLD AUTO: NORMAL FL (ref 9.4–12.4)
POTASSIUM SERPL-SCNC: 3.3 MEQ/L (ref 3.5–5.2)
POTASSIUM SERPL-SCNC: 3.4 MEQ/L (ref 3.5–5.2)
POTASSIUM SERPL-SCNC: 3.6 MEQ/L (ref 3.5–5.2)
POTASSIUM SERPL-SCNC: 3.7 MEQ/L (ref 3.5–5.2)
RBC # BLD AUTO: 3.65 MILL/MM3 (ref 4.2–5.4)
RBC # BLD AUTO: NORMAL MILL/MM3 (ref 4.2–5.4)
REASON FOR REJECTION: NORMAL
REASON FOR REJECTION: NORMAL
REJECTED TEST: NORMAL
REJECTED TEST: NORMAL
SODIUM SERPL-SCNC: 138 MEQ/L (ref 135–145)
SODIUM SERPL-SCNC: 139 MEQ/L (ref 135–145)
SODIUM SERPL-SCNC: 140 MEQ/L (ref 135–145)
SODIUM SERPL-SCNC: 140 MEQ/L (ref 135–145)
SODIUM SERPL-SCNC: 141 MEQ/L (ref 135–145)
SODIUM SERPL-SCNC: 142 MEQ/L (ref 135–145)
WBC # BLD AUTO: 11.5 THOU/MM3 (ref 4.8–10.8)
WBC # BLD AUTO: NORMAL THOU/MM3 (ref 4.8–10.8)

## 2024-07-09 PROCEDURE — 6360000002 HC RX W HCPCS

## 2024-07-09 PROCEDURE — 6370000000 HC RX 637 (ALT 250 FOR IP)

## 2024-07-09 PROCEDURE — 6370000000 HC RX 637 (ALT 250 FOR IP): Performed by: NURSE PRACTITIONER

## 2024-07-09 PROCEDURE — 6360000002 HC RX W HCPCS: Performed by: NURSE PRACTITIONER

## 2024-07-09 PROCEDURE — P9047 ALBUMIN (HUMAN), 25%, 50ML: HCPCS | Performed by: NURSE PRACTITIONER

## 2024-07-09 PROCEDURE — 97530 THERAPEUTIC ACTIVITIES: CPT

## 2024-07-09 PROCEDURE — 2580000003 HC RX 258

## 2024-07-09 PROCEDURE — 80048 BASIC METABOLIC PNL TOTAL CA: CPT

## 2024-07-09 PROCEDURE — 97116 GAIT TRAINING THERAPY: CPT

## 2024-07-09 PROCEDURE — 2000000000 HC ICU R&B

## 2024-07-09 PROCEDURE — 2580000003 HC RX 258: Performed by: PSYCHIATRY & NEUROLOGY

## 2024-07-09 PROCEDURE — 85027 COMPLETE CBC AUTOMATED: CPT

## 2024-07-09 PROCEDURE — 2580000003 HC RX 258: Performed by: INTERNAL MEDICINE

## 2024-07-09 PROCEDURE — 6370000000 HC RX 637 (ALT 250 FOR IP): Performed by: PSYCHIATRY & NEUROLOGY

## 2024-07-09 PROCEDURE — 83735 ASSAY OF MAGNESIUM: CPT

## 2024-07-09 PROCEDURE — 36415 COLL VENOUS BLD VENIPUNCTURE: CPT

## 2024-07-09 PROCEDURE — 99291 CRITICAL CARE FIRST HOUR: CPT | Performed by: INTERNAL MEDICINE

## 2024-07-09 PROCEDURE — 99232 SBSQ HOSP IP/OBS MODERATE 35: CPT | Performed by: NURSE PRACTITIONER

## 2024-07-09 RX ORDER — ONDANSETRON 4 MG/1
4 TABLET, ORALLY DISINTEGRATING ORAL EVERY 24 HOURS
Status: DISCONTINUED | OUTPATIENT
Start: 2024-07-10 | End: 2024-07-14 | Stop reason: HOSPADM

## 2024-07-09 RX ORDER — MAGNESIUM SULFATE IN WATER 40 MG/ML
2000 INJECTION, SOLUTION INTRAVENOUS ONCE
Status: COMPLETED | OUTPATIENT
Start: 2024-07-09 | End: 2024-07-09

## 2024-07-09 RX ORDER — KETOROLAC TROMETHAMINE 30 MG/ML
30 INJECTION, SOLUTION INTRAMUSCULAR; INTRAVENOUS EVERY 6 HOURS PRN
Status: DISCONTINUED | OUTPATIENT
Start: 2024-07-09 | End: 2024-07-14 | Stop reason: HOSPADM

## 2024-07-09 RX ORDER — 3% SODIUM CHLORIDE 3 G/100ML
125 INJECTION, SOLUTION INTRAVENOUS CONTINUOUS
Status: DISPENSED | OUTPATIENT
Start: 2024-07-09 | End: 2024-07-10

## 2024-07-09 RX ADMIN — HYDROXYZINE HYDROCHLORIDE 50 MG: 25 TABLET ORAL at 21:01

## 2024-07-09 RX ADMIN — NIMODIPINE 60 MG: 30 CAPSULE, LIQUID FILLED ORAL at 16:38

## 2024-07-09 RX ADMIN — POTASSIUM CHLORIDE 20 MEQ: 29.8 INJECTION, SOLUTION INTRAVENOUS at 07:24

## 2024-07-09 RX ADMIN — ALBUMIN (HUMAN) 25 G: 0.25 INJECTION, SOLUTION INTRAVENOUS at 22:38

## 2024-07-09 RX ADMIN — ASPIRIN 81 MG 81 MG: 81 TABLET ORAL at 09:18

## 2024-07-09 RX ADMIN — KETOROLAC TROMETHAMINE 30 MG: 30 INJECTION, SOLUTION INTRAMUSCULAR at 21:02

## 2024-07-09 RX ADMIN — HEPARIN SODIUM 5000 UNITS: 5000 INJECTION INTRAVENOUS; SUBCUTANEOUS at 06:48

## 2024-07-09 RX ADMIN — POTASSIUM CHLORIDE 20 MEQ: 29.8 INJECTION, SOLUTION INTRAVENOUS at 22:26

## 2024-07-09 RX ADMIN — PREGABALIN 75 MG: 75 CAPSULE ORAL at 13:32

## 2024-07-09 RX ADMIN — SODIUM CHLORIDE 125 ML/HR: 3 INJECTION, SOLUTION INTRAVENOUS at 21:26

## 2024-07-09 RX ADMIN — FAMOTIDINE 20 MG: 20 TABLET, FILM COATED ORAL at 21:02

## 2024-07-09 RX ADMIN — MAGNESIUM HYDROXIDE 30 ML: 1200 LIQUID ORAL at 11:14

## 2024-07-09 RX ADMIN — ACETAMINOPHEN 650 MG: 325 TABLET ORAL at 04:31

## 2024-07-09 RX ADMIN — ACETAMINOPHEN 650 MG: 325 TABLET ORAL at 15:37

## 2024-07-09 RX ADMIN — POTASSIUM CHLORIDE 20 MEQ: 29.8 INJECTION, SOLUTION INTRAVENOUS at 06:36

## 2024-07-09 RX ADMIN — AMLODIPINE BESYLATE 10 MG: 10 TABLET ORAL at 09:21

## 2024-07-09 RX ADMIN — NIMODIPINE 60 MG: 30 CAPSULE, LIQUID FILLED ORAL at 08:23

## 2024-07-09 RX ADMIN — PREGABALIN 75 MG: 75 CAPSULE ORAL at 09:18

## 2024-07-09 RX ADMIN — SODIUM CHLORIDE 120 ML/HR: 3 INJECTION, SOLUTION INTRAVENOUS at 06:47

## 2024-07-09 RX ADMIN — NIMODIPINE 60 MG: 30 CAPSULE, LIQUID FILLED ORAL at 12:01

## 2024-07-09 RX ADMIN — SODIUM CHLORIDE 125 ML/HR: 3 INJECTION, SOLUTION INTRAVENOUS at 16:56

## 2024-07-09 RX ADMIN — SODIUM CHLORIDE 125 ML/HR: 3 INJECTION, SOLUTION INTRAVENOUS at 12:23

## 2024-07-09 RX ADMIN — SODIUM CHLORIDE 115 ML/HR: 3 INJECTION, SOLUTION INTRAVENOUS at 01:47

## 2024-07-09 RX ADMIN — ALBUMIN (HUMAN) 25 G: 0.25 INJECTION, SOLUTION INTRAVENOUS at 13:32

## 2024-07-09 RX ADMIN — ACETAMINOPHEN 650 MG: 325 TABLET ORAL at 09:18

## 2024-07-09 RX ADMIN — MAGNESIUM SULFATE HEPTAHYDRATE 2000 MG: 40 INJECTION, SOLUTION INTRAVENOUS at 11:13

## 2024-07-09 RX ADMIN — ACETAMINOPHEN 650 MG: 325 TABLET ORAL at 00:30

## 2024-07-09 RX ADMIN — DOCUSATE SODIUM 200 MG: 100 CAPSULE, LIQUID FILLED ORAL at 21:01

## 2024-07-09 RX ADMIN — SODIUM CHLORIDE, PRESERVATIVE FREE 10 ML: 5 INJECTION INTRAVENOUS at 22:32

## 2024-07-09 RX ADMIN — ALBUMIN (HUMAN) 25 G: 0.25 INJECTION, SOLUTION INTRAVENOUS at 04:07

## 2024-07-09 RX ADMIN — LISINOPRIL 10 MG: 10 TABLET ORAL at 09:19

## 2024-07-09 RX ADMIN — FAMOTIDINE 20 MG: 20 TABLET, FILM COATED ORAL at 09:18

## 2024-07-09 RX ADMIN — PREGABALIN 75 MG: 75 CAPSULE ORAL at 21:02

## 2024-07-09 RX ADMIN — BUPROPION HYDROCHLORIDE 300 MG: 300 TABLET, EXTENDED RELEASE ORAL at 09:21

## 2024-07-09 RX ADMIN — METHADONE HYDROCHLORIDE 110 MG: 10 SOLUTION ORAL at 08:25

## 2024-07-09 RX ADMIN — NALOXEGOL OXALATE 12.5 MG: 12.5 TABLET, FILM COATED ORAL at 06:48

## 2024-07-09 RX ADMIN — POTASSIUM CHLORIDE 20 MEQ: 29.8 INJECTION, SOLUTION INTRAVENOUS at 21:19

## 2024-07-09 RX ADMIN — NIMODIPINE 60 MG: 30 CAPSULE, LIQUID FILLED ORAL at 20:03

## 2024-07-09 RX ADMIN — NIMODIPINE 60 MG: 30 CAPSULE, LIQUID FILLED ORAL at 03:52

## 2024-07-09 RX ADMIN — HEPARIN SODIUM 5000 UNITS: 5000 INJECTION INTRAVENOUS; SUBCUTANEOUS at 13:32

## 2024-07-09 ASSESSMENT — PAIN SCALES - GENERAL
PAINLEVEL_OUTOF10: 2
PAINLEVEL_OUTOF10: 6
PAINLEVEL_OUTOF10: 3
PAINLEVEL_OUTOF10: 5
PAINLEVEL_OUTOF10: 0
PAINLEVEL_OUTOF10: 9
PAINLEVEL_OUTOF10: 6
PAINLEVEL_OUTOF10: 3
PAINLEVEL_OUTOF10: 3
PAINLEVEL_OUTOF10: 5
PAINLEVEL_OUTOF10: 5

## 2024-07-09 ASSESSMENT — PAIN DESCRIPTION - DESCRIPTORS
DESCRIPTORS: ACHING;DISCOMFORT
DESCRIPTORS: ACHING;DISCOMFORT
DESCRIPTORS: ACHING
DESCRIPTORS: ACHING

## 2024-07-09 ASSESSMENT — PAIN DESCRIPTION - LOCATION
LOCATION: HEAD
LOCATION: HAND
LOCATION: HEAD

## 2024-07-09 ASSESSMENT — PAIN DESCRIPTION - ORIENTATION
ORIENTATION: POSTERIOR
ORIENTATION: POSTERIOR

## 2024-07-09 ASSESSMENT — PAIN - FUNCTIONAL ASSESSMENT: PAIN_FUNCTIONAL_ASSESSMENT: ACTIVITIES ARE NOT PREVENTED

## 2024-07-10 ENCOUNTER — APPOINTMENT (OUTPATIENT)
Dept: CT IMAGING | Age: 49
DRG: 030 | End: 2024-07-10
Payer: COMMERCIAL

## 2024-07-10 LAB
ANION GAP SERPL CALC-SCNC: 10 MEQ/L (ref 8–16)
ANION GAP SERPL CALC-SCNC: 10 MEQ/L (ref 8–16)
ANION GAP SERPL CALC-SCNC: 12 MEQ/L (ref 8–16)
ANION GAP SERPL CALC-SCNC: 14 MEQ/L (ref 8–16)
ANION GAP SERPL CALC-SCNC: 8 MEQ/L (ref 8–16)
BUN SERPL-MCNC: 11 MG/DL (ref 7–22)
BUN SERPL-MCNC: 6 MG/DL (ref 7–22)
BUN SERPL-MCNC: 8 MG/DL (ref 7–22)
BUN SERPL-MCNC: 8 MG/DL (ref 7–22)
BUN SERPL-MCNC: 9 MG/DL (ref 7–22)
CALCIUM SERPL-MCNC: 8.3 MG/DL (ref 8.5–10.5)
CALCIUM SERPL-MCNC: 8.4 MG/DL (ref 8.5–10.5)
CALCIUM SERPL-MCNC: 8.6 MG/DL (ref 8.5–10.5)
CALCIUM SERPL-MCNC: 8.7 MG/DL (ref 8.5–10.5)
CALCIUM SERPL-MCNC: 9.1 MG/DL (ref 8.5–10.5)
CHLORIDE SERPL-SCNC: 107 MEQ/L (ref 98–111)
CHLORIDE SERPL-SCNC: 108 MEQ/L (ref 98–111)
CHLORIDE SERPL-SCNC: 110 MEQ/L (ref 98–111)
CO2 SERPL-SCNC: 22 MEQ/L (ref 23–33)
CO2 SERPL-SCNC: 23 MEQ/L (ref 23–33)
CO2 SERPL-SCNC: 24 MEQ/L (ref 23–33)
CREAT SERPL-MCNC: 0.6 MG/DL (ref 0.4–1.2)
CREAT SERPL-MCNC: 0.6 MG/DL (ref 0.4–1.2)
CREAT SERPL-MCNC: 0.7 MG/DL (ref 0.4–1.2)
DEPRECATED RDW RBC AUTO: 47.1 FL (ref 35–45)
ERYTHROCYTE [DISTWIDTH] IN BLOOD BY AUTOMATED COUNT: 14.8 % (ref 11.5–14.5)
GFR SERPL CREATININE-BSD FRML MDRD: > 90 ML/MIN/1.73M2
GLUCOSE SERPL-MCNC: 103 MG/DL (ref 70–108)
GLUCOSE SERPL-MCNC: 124 MG/DL (ref 70–108)
GLUCOSE SERPL-MCNC: 89 MG/DL (ref 70–108)
GLUCOSE SERPL-MCNC: 91 MG/DL (ref 70–108)
GLUCOSE SERPL-MCNC: 94 MG/DL (ref 70–108)
HCT VFR BLD AUTO: 29.4 % (ref 37–47)
HGB BLD-MCNC: 9.7 GM/DL (ref 12–16)
MAGNESIUM SERPL-MCNC: 1.6 MG/DL (ref 1.6–2.4)
MCH RBC QN AUTO: 29 PG (ref 26–33)
MCHC RBC AUTO-ENTMCNC: 33 GM/DL (ref 32.2–35.5)
MCV RBC AUTO: 87.8 FL (ref 81–99)
PLATELET # BLD AUTO: 243 THOU/MM3 (ref 130–400)
PMV BLD AUTO: 9.7 FL (ref 9.4–12.4)
POTASSIUM SERPL-SCNC: 3.4 MEQ/L (ref 3.5–5.2)
POTASSIUM SERPL-SCNC: 3.6 MEQ/L (ref 3.5–5.2)
POTASSIUM SERPL-SCNC: 3.8 MEQ/L (ref 3.5–5.2)
POTASSIUM SERPL-SCNC: 3.9 MEQ/L (ref 3.5–5.2)
POTASSIUM SERPL-SCNC: 4.1 MEQ/L (ref 3.5–5.2)
RBC # BLD AUTO: 3.35 MILL/MM3 (ref 4.2–5.4)
SODIUM SERPL-SCNC: 141 MEQ/L (ref 135–145)
SODIUM SERPL-SCNC: 142 MEQ/L (ref 135–145)
SODIUM SERPL-SCNC: 143 MEQ/L (ref 135–145)
SODIUM SERPL-SCNC: 143 MEQ/L (ref 135–145)
WBC # BLD AUTO: 9.9 THOU/MM3 (ref 4.8–10.8)

## 2024-07-10 PROCEDURE — 84295 ASSAY OF SERUM SODIUM: CPT

## 2024-07-10 PROCEDURE — 6370000000 HC RX 637 (ALT 250 FOR IP)

## 2024-07-10 PROCEDURE — P9047 ALBUMIN (HUMAN), 25%, 50ML: HCPCS | Performed by: NURSE PRACTITIONER

## 2024-07-10 PROCEDURE — 2000000000 HC ICU R&B

## 2024-07-10 PROCEDURE — 92526 ORAL FUNCTION THERAPY: CPT

## 2024-07-10 PROCEDURE — 2580000003 HC RX 258

## 2024-07-10 PROCEDURE — 6370000000 HC RX 637 (ALT 250 FOR IP): Performed by: NURSE PRACTITIONER

## 2024-07-10 PROCEDURE — 80048 BASIC METABOLIC PNL TOTAL CA: CPT

## 2024-07-10 PROCEDURE — 6360000002 HC RX W HCPCS: Performed by: NURSE PRACTITIONER

## 2024-07-10 PROCEDURE — P9047 ALBUMIN (HUMAN), 25%, 50ML: HCPCS

## 2024-07-10 PROCEDURE — 6360000004 HC RX CONTRAST MEDICATION: Performed by: NURSE PRACTITIONER

## 2024-07-10 PROCEDURE — 83735 ASSAY OF MAGNESIUM: CPT

## 2024-07-10 PROCEDURE — 97530 THERAPEUTIC ACTIVITIES: CPT

## 2024-07-10 PROCEDURE — 97129 THER IVNTJ 1ST 15 MIN: CPT

## 2024-07-10 PROCEDURE — 6360000002 HC RX W HCPCS

## 2024-07-10 PROCEDURE — 70496 CT ANGIOGRAPHY HEAD: CPT

## 2024-07-10 PROCEDURE — 99291 CRITICAL CARE FIRST HOUR: CPT | Performed by: INTERNAL MEDICINE

## 2024-07-10 PROCEDURE — 6360000002 HC RX W HCPCS: Performed by: STUDENT IN AN ORGANIZED HEALTH CARE EDUCATION/TRAINING PROGRAM

## 2024-07-10 PROCEDURE — 85027 COMPLETE CBC AUTOMATED: CPT

## 2024-07-10 PROCEDURE — 36415 COLL VENOUS BLD VENIPUNCTURE: CPT

## 2024-07-10 PROCEDURE — 99232 SBSQ HOSP IP/OBS MODERATE 35: CPT

## 2024-07-10 RX ORDER — DOCUSATE SODIUM 100 MG/1
100 CAPSULE, LIQUID FILLED ORAL 2 TIMES DAILY
Status: DISCONTINUED | OUTPATIENT
Start: 2024-07-10 | End: 2024-07-14 | Stop reason: HOSPADM

## 2024-07-10 RX ORDER — MAGNESIUM SULFATE IN WATER 40 MG/ML
2000 INJECTION, SOLUTION INTRAVENOUS ONCE
Status: COMPLETED | OUTPATIENT
Start: 2024-07-10 | End: 2024-07-10

## 2024-07-10 RX ORDER — ALBUMIN (HUMAN) 12.5 G/50ML
25 SOLUTION INTRAVENOUS EVERY 8 HOURS
Status: DISCONTINUED | OUTPATIENT
Start: 2024-07-10 | End: 2024-07-10

## 2024-07-10 RX ORDER — SODIUM CHLORIDE 9 MG/ML
INJECTION, SOLUTION INTRAVENOUS CONTINUOUS
Status: DISCONTINUED | OUTPATIENT
Start: 2024-07-10 | End: 2024-07-11

## 2024-07-10 RX ORDER — SENNOSIDES A AND B 8.6 MG/1
1 TABLET, FILM COATED ORAL NIGHTLY
Status: DISCONTINUED | OUTPATIENT
Start: 2024-07-10 | End: 2024-07-14 | Stop reason: HOSPADM

## 2024-07-10 RX ORDER — 3% SODIUM CHLORIDE 3 G/100ML
75 INJECTION, SOLUTION INTRAVENOUS CONTINUOUS
Status: DISPENSED | OUTPATIENT
Start: 2024-07-10 | End: 2024-07-11

## 2024-07-10 RX ADMIN — KETOROLAC TROMETHAMINE 30 MG: 30 INJECTION, SOLUTION INTRAMUSCULAR at 23:05

## 2024-07-10 RX ADMIN — NIMODIPINE 60 MG: 30 CAPSULE, LIQUID FILLED ORAL at 12:00

## 2024-07-10 RX ADMIN — SODIUM CHLORIDE 125 ML/HR: 3 INJECTION, SOLUTION INTRAVENOUS at 01:45

## 2024-07-10 RX ADMIN — FAMOTIDINE 20 MG: 20 TABLET, FILM COATED ORAL at 20:31

## 2024-07-10 RX ADMIN — NIMODIPINE 60 MG: 30 CAPSULE, LIQUID FILLED ORAL at 00:26

## 2024-07-10 RX ADMIN — DOCUSATE SODIUM 200 MG: 100 CAPSULE, LIQUID FILLED ORAL at 08:53

## 2024-07-10 RX ADMIN — AMLODIPINE BESYLATE 10 MG: 10 TABLET ORAL at 08:53

## 2024-07-10 RX ADMIN — SODIUM CHLORIDE 125 ML/HR: 3 INJECTION, SOLUTION INTRAVENOUS at 17:06

## 2024-07-10 RX ADMIN — ALBUMIN (HUMAN) 25 G: 0.25 INJECTION, SOLUTION INTRAVENOUS at 11:46

## 2024-07-10 RX ADMIN — HEPARIN SODIUM 5000 UNITS: 5000 INJECTION INTRAVENOUS; SUBCUTANEOUS at 22:10

## 2024-07-10 RX ADMIN — HYDROXYZINE HYDROCHLORIDE 50 MG: 25 TABLET ORAL at 20:31

## 2024-07-10 RX ADMIN — NIMODIPINE 60 MG: 30 CAPSULE, LIQUID FILLED ORAL at 16:57

## 2024-07-10 RX ADMIN — SODIUM CHLORIDE 125 ML/HR: 3 INJECTION, SOLUTION INTRAVENOUS at 11:48

## 2024-07-10 RX ADMIN — NIMODIPINE 60 MG: 30 CAPSULE, LIQUID FILLED ORAL at 03:52

## 2024-07-10 RX ADMIN — PREGABALIN 75 MG: 75 CAPSULE ORAL at 20:31

## 2024-07-10 RX ADMIN — POTASSIUM CHLORIDE 20 MEQ: 29.8 INJECTION, SOLUTION INTRAVENOUS at 13:24

## 2024-07-10 RX ADMIN — FAMOTIDINE 20 MG: 20 TABLET, FILM COATED ORAL at 08:54

## 2024-07-10 RX ADMIN — NIMODIPINE 60 MG: 30 CAPSULE, LIQUID FILLED ORAL at 08:53

## 2024-07-10 RX ADMIN — ONDANSETRON 4 MG: 4 TABLET, ORALLY DISINTEGRATING ORAL at 03:53

## 2024-07-10 RX ADMIN — ALBUMIN (HUMAN) 25 G: 0.25 INJECTION, SOLUTION INTRAVENOUS at 05:15

## 2024-07-10 RX ADMIN — POTASSIUM CHLORIDE 20 MEQ: 29.8 INJECTION, SOLUTION INTRAVENOUS at 14:51

## 2024-07-10 RX ADMIN — HEPARIN SODIUM 5000 UNITS: 5000 INJECTION INTRAVENOUS; SUBCUTANEOUS at 05:17

## 2024-07-10 RX ADMIN — PREGABALIN 75 MG: 75 CAPSULE ORAL at 14:51

## 2024-07-10 RX ADMIN — KETOROLAC TROMETHAMINE 30 MG: 30 INJECTION, SOLUTION INTRAMUSCULAR at 02:36

## 2024-07-10 RX ADMIN — HEPARIN SODIUM 5000 UNITS: 5000 INJECTION INTRAVENOUS; SUBCUTANEOUS at 14:51

## 2024-07-10 RX ADMIN — METHADONE HYDROCHLORIDE 110 MG: 10 SOLUTION ORAL at 10:29

## 2024-07-10 RX ADMIN — SODIUM CHLORIDE 125 ML/HR: 3 INJECTION, SOLUTION INTRAVENOUS at 06:11

## 2024-07-10 RX ADMIN — LISINOPRIL 10 MG: 10 TABLET ORAL at 08:53

## 2024-07-10 RX ADMIN — IOPAMIDOL 8 ML: 755 INJECTION, SOLUTION INTRAVENOUS at 17:48

## 2024-07-10 RX ADMIN — SODIUM CHLORIDE: 9 INJECTION, SOLUTION INTRAVENOUS at 21:11

## 2024-07-10 RX ADMIN — KETOROLAC TROMETHAMINE 30 MG: 30 INJECTION, SOLUTION INTRAMUSCULAR at 16:54

## 2024-07-10 RX ADMIN — PREGABALIN 75 MG: 75 CAPSULE ORAL at 08:53

## 2024-07-10 RX ADMIN — KETOROLAC TROMETHAMINE 30 MG: 30 INJECTION, SOLUTION INTRAMUSCULAR at 09:10

## 2024-07-10 RX ADMIN — MAGNESIUM SULFATE HEPTAHYDRATE 2000 MG: 40 INJECTION, SOLUTION INTRAVENOUS at 17:04

## 2024-07-10 RX ADMIN — ONDANSETRON 4 MG: 2 INJECTION INTRAMUSCULAR; INTRAVENOUS at 16:54

## 2024-07-10 RX ADMIN — SODIUM CHLORIDE 100 ML/HR: 3 INJECTION, SOLUTION INTRAVENOUS at 23:00

## 2024-07-10 RX ADMIN — BUPROPION HYDROCHLORIDE 300 MG: 300 TABLET, EXTENDED RELEASE ORAL at 08:54

## 2024-07-10 RX ADMIN — ASPIRIN 81 MG 81 MG: 81 TABLET ORAL at 08:54

## 2024-07-10 RX ADMIN — NIMODIPINE 60 MG: 30 CAPSULE, LIQUID FILLED ORAL at 20:31

## 2024-07-10 ASSESSMENT — PAIN DESCRIPTION - DESCRIPTORS
DESCRIPTORS: ACHING
DESCRIPTORS: ACHING;CRUSHING

## 2024-07-10 ASSESSMENT — PAIN DESCRIPTION - ORIENTATION
ORIENTATION: RIGHT
ORIENTATION: RIGHT
ORIENTATION: MID

## 2024-07-10 ASSESSMENT — PAIN - FUNCTIONAL ASSESSMENT
PAIN_FUNCTIONAL_ASSESSMENT: PREVENTS OR INTERFERES SOME ACTIVE ACTIVITIES AND ADLS
PAIN_FUNCTIONAL_ASSESSMENT: ACTIVITIES ARE NOT PREVENTED

## 2024-07-10 ASSESSMENT — PAIN DESCRIPTION - LOCATION
LOCATION: HAND
LOCATION: HEAD

## 2024-07-10 ASSESSMENT — PAIN SCALES - GENERAL
PAINLEVEL_OUTOF10: 5
PAINLEVEL_OUTOF10: 8
PAINLEVEL_OUTOF10: 10
PAINLEVEL_OUTOF10: 0
PAINLEVEL_OUTOF10: 7
PAINLEVEL_OUTOF10: 10
PAINLEVEL_OUTOF10: 9
PAINLEVEL_OUTOF10: 2

## 2024-07-10 ASSESSMENT — PAIN DESCRIPTION - PAIN TYPE: TYPE: ACUTE PAIN

## 2024-07-10 ASSESSMENT — PAIN DESCRIPTION - ONSET: ONSET: ON-GOING

## 2024-07-10 ASSESSMENT — PAIN DESCRIPTION - FREQUENCY: FREQUENCY: CONTINUOUS

## 2024-07-10 NOTE — FLOWSHEET NOTE
07/10/24 1902   Treatment Team Notification   Reason for Communication Critical results  (CTA head results received from [facility] radiolgy via phone)   Type of Critical Result Radiology   Person Result Received From [place]   Critical Radiology Result Type Other (comment)  (CTA Head)   Name of Team Member Notified Dr Ramirez   Treatment Team Role Resident   Method of Communication Face to face   Notification Time 1900     \"Decreased flow in the anterior cerebral artery with high grade narrowing verses short segment occlusion on Left, similar to previous. High grade narrowing verses short segment occlusion of M2 segment Left MCA, increased from previous\"

## 2024-07-11 LAB
ANION GAP SERPL CALC-SCNC: 8 MEQ/L (ref 8–16)
BUN SERPL-MCNC: 8 MG/DL (ref 7–22)
CALCIUM SERPL-MCNC: 8.2 MG/DL (ref 8.5–10.5)
CHLORIDE SERPL-SCNC: 112 MEQ/L (ref 98–111)
CO2 SERPL-SCNC: 24 MEQ/L (ref 23–33)
CREAT SERPL-MCNC: 0.7 MG/DL (ref 0.4–1.2)
DEPRECATED RDW RBC AUTO: 47.7 FL (ref 35–45)
ERYTHROCYTE [DISTWIDTH] IN BLOOD BY AUTOMATED COUNT: 14.8 % (ref 11.5–14.5)
GFR SERPL CREATININE-BSD FRML MDRD: > 90 ML/MIN/1.73M2
GLUCOSE SERPL-MCNC: 102 MG/DL (ref 70–108)
HCT VFR BLD AUTO: 28 % (ref 37–47)
HGB BLD-MCNC: 8.9 GM/DL (ref 12–16)
MCH RBC QN AUTO: 28.1 PG (ref 26–33)
MCHC RBC AUTO-ENTMCNC: 31.8 GM/DL (ref 32.2–35.5)
MCV RBC AUTO: 88.3 FL (ref 81–99)
PLATELET # BLD AUTO: 240 THOU/MM3 (ref 130–400)
PMV BLD AUTO: 9.9 FL (ref 9.4–12.4)
POTASSIUM SERPL-SCNC: 3.5 MEQ/L (ref 3.5–5.2)
RBC # BLD AUTO: 3.17 MILL/MM3 (ref 4.2–5.4)
SODIUM SERPL-SCNC: 139 MEQ/L (ref 135–145)
SODIUM SERPL-SCNC: 140 MEQ/L (ref 135–145)
SODIUM SERPL-SCNC: 141 MEQ/L (ref 135–145)
SODIUM SERPL-SCNC: 143 MEQ/L (ref 135–145)
SODIUM SERPL-SCNC: 144 MEQ/L (ref 135–145)
WBC # BLD AUTO: 10.4 THOU/MM3 (ref 4.8–10.8)

## 2024-07-11 PROCEDURE — 6370000000 HC RX 637 (ALT 250 FOR IP)

## 2024-07-11 PROCEDURE — 99232 SBSQ HOSP IP/OBS MODERATE 35: CPT

## 2024-07-11 PROCEDURE — 6370000000 HC RX 637 (ALT 250 FOR IP): Performed by: NURSE PRACTITIONER

## 2024-07-11 PROCEDURE — 36415 COLL VENOUS BLD VENIPUNCTURE: CPT

## 2024-07-11 PROCEDURE — 6360000002 HC RX W HCPCS

## 2024-07-11 PROCEDURE — 36592 COLLECT BLOOD FROM PICC: CPT

## 2024-07-11 PROCEDURE — 99291 CRITICAL CARE FIRST HOUR: CPT | Performed by: INTERNAL MEDICINE

## 2024-07-11 PROCEDURE — 80048 BASIC METABOLIC PNL TOTAL CA: CPT

## 2024-07-11 PROCEDURE — 2580000003 HC RX 258

## 2024-07-11 PROCEDURE — 85027 COMPLETE CBC AUTOMATED: CPT

## 2024-07-11 PROCEDURE — 84295 ASSAY OF SERUM SODIUM: CPT

## 2024-07-11 PROCEDURE — 2000000000 HC ICU R&B

## 2024-07-11 RX ORDER — 3% SODIUM CHLORIDE 3 G/100ML
50 INJECTION, SOLUTION INTRAVENOUS CONTINUOUS
Status: DISCONTINUED | OUTPATIENT
Start: 2024-07-11 | End: 2024-07-12

## 2024-07-11 RX ADMIN — PREGABALIN 75 MG: 75 CAPSULE ORAL at 19:30

## 2024-07-11 RX ADMIN — ONDANSETRON 4 MG: 4 TABLET, ORALLY DISINTEGRATING ORAL at 04:04

## 2024-07-11 RX ADMIN — NIMODIPINE 60 MG: 30 CAPSULE, LIQUID FILLED ORAL at 11:34

## 2024-07-11 RX ADMIN — KETOROLAC TROMETHAMINE 30 MG: 30 INJECTION, SOLUTION INTRAMUSCULAR at 19:31

## 2024-07-11 RX ADMIN — FAMOTIDINE 20 MG: 20 TABLET, FILM COATED ORAL at 08:11

## 2024-07-11 RX ADMIN — SODIUM CHLORIDE 100 ML/HR: 3 INJECTION, SOLUTION INTRAVENOUS at 04:11

## 2024-07-11 RX ADMIN — SODIUM CHLORIDE: 9 INJECTION, SOLUTION INTRAVENOUS at 16:05

## 2024-07-11 RX ADMIN — HEPARIN SODIUM 5000 UNITS: 5000 INJECTION INTRAVENOUS; SUBCUTANEOUS at 22:30

## 2024-07-11 RX ADMIN — NIMODIPINE 60 MG: 30 CAPSULE, LIQUID FILLED ORAL at 08:10

## 2024-07-11 RX ADMIN — SODIUM CHLORIDE 75 ML/HR: 3 INJECTION, SOLUTION INTRAVENOUS at 23:31

## 2024-07-11 RX ADMIN — KETOROLAC TROMETHAMINE 30 MG: 30 INJECTION, SOLUTION INTRAMUSCULAR at 05:31

## 2024-07-11 RX ADMIN — SODIUM CHLORIDE 75 ML/HR: 3 INJECTION, SOLUTION INTRAVENOUS at 16:10

## 2024-07-11 RX ADMIN — FAMOTIDINE 20 MG: 20 TABLET, FILM COATED ORAL at 19:30

## 2024-07-11 RX ADMIN — AMLODIPINE BESYLATE 10 MG: 10 TABLET ORAL at 08:13

## 2024-07-11 RX ADMIN — NIMODIPINE 60 MG: 30 CAPSULE, LIQUID FILLED ORAL at 00:08

## 2024-07-11 RX ADMIN — ASPIRIN 81 MG 81 MG: 81 TABLET ORAL at 08:10

## 2024-07-11 RX ADMIN — NIMODIPINE 60 MG: 30 CAPSULE, LIQUID FILLED ORAL at 04:04

## 2024-07-11 RX ADMIN — HYDROXYZINE HYDROCHLORIDE 50 MG: 25 TABLET ORAL at 20:19

## 2024-07-11 RX ADMIN — SODIUM CHLORIDE 100 ML/HR: 3 INJECTION, SOLUTION INTRAVENOUS at 09:53

## 2024-07-11 RX ADMIN — PREGABALIN 75 MG: 75 CAPSULE ORAL at 13:53

## 2024-07-11 RX ADMIN — METHADONE HYDROCHLORIDE 110 MG: 10 SOLUTION ORAL at 09:03

## 2024-07-11 RX ADMIN — HEPARIN SODIUM 5000 UNITS: 5000 INJECTION INTRAVENOUS; SUBCUTANEOUS at 13:53

## 2024-07-11 RX ADMIN — LISINOPRIL 10 MG: 10 TABLET ORAL at 08:10

## 2024-07-11 RX ADMIN — KETOROLAC TROMETHAMINE 30 MG: 30 INJECTION, SOLUTION INTRAMUSCULAR at 11:34

## 2024-07-11 RX ADMIN — HEPARIN SODIUM 5000 UNITS: 5000 INJECTION INTRAVENOUS; SUBCUTANEOUS at 05:31

## 2024-07-11 RX ADMIN — BUPROPION HYDROCHLORIDE 300 MG: 300 TABLET, EXTENDED RELEASE ORAL at 08:10

## 2024-07-11 RX ADMIN — NIMODIPINE 60 MG: 30 CAPSULE, LIQUID FILLED ORAL at 15:33

## 2024-07-11 RX ADMIN — PREGABALIN 75 MG: 75 CAPSULE ORAL at 08:11

## 2024-07-11 RX ADMIN — NIMODIPINE 60 MG: 30 CAPSULE, LIQUID FILLED ORAL at 19:30

## 2024-07-11 ASSESSMENT — PAIN DESCRIPTION - DESCRIPTORS
DESCRIPTORS: ACHING

## 2024-07-11 ASSESSMENT — PAIN SCALES - GENERAL
PAINLEVEL_OUTOF10: 7
PAINLEVEL_OUTOF10: 8
PAINLEVEL_OUTOF10: 2
PAINLEVEL_OUTOF10: 9
PAINLEVEL_OUTOF10: 2
PAINLEVEL_OUTOF10: 6
PAINLEVEL_OUTOF10: 7
PAINLEVEL_OUTOF10: 9
PAINLEVEL_OUTOF10: 5
PAINLEVEL_OUTOF10: 2

## 2024-07-11 ASSESSMENT — PAIN DESCRIPTION - LOCATION
LOCATION: EYE;NECK
LOCATION: HEAD

## 2024-07-11 ASSESSMENT — PAIN DESCRIPTION - FREQUENCY
FREQUENCY: CONTINUOUS
FREQUENCY: CONTINUOUS

## 2024-07-11 ASSESSMENT — PAIN DESCRIPTION - ORIENTATION
ORIENTATION: MID
ORIENTATION: MID

## 2024-07-11 ASSESSMENT — PAIN - FUNCTIONAL ASSESSMENT: PAIN_FUNCTIONAL_ASSESSMENT: ACTIVITIES ARE NOT PREVENTED

## 2024-07-11 ASSESSMENT — PAIN DESCRIPTION - ONSET: ONSET: ON-GOING

## 2024-07-11 ASSESSMENT — PAIN DESCRIPTION - PAIN TYPE
TYPE: ACUTE PAIN
TYPE: ACUTE PAIN

## 2024-07-11 NOTE — CARE COORDINATION
7/11/24, 12:57 PM EDT    DISCHARGE ON GOING EVALUATION    [facility] day: 11  Location: 4D-06/006-A Reason for admit: SAH (subarachnoid hemorrhage) (HCC) [I60.9]  Subarachnoid bleed (HCC) [I60.9]     Procedures:   6/30 ACOM aneurysm coiling   7/3 PICC right basilic    Imaging since last note:   7/7 CT Head: Decreased but not entirely resolved subarachnoid hemorrhage centered   primarily in the inferior aspect of the interhemispheric fissure, left   sylvian fissure, and scattered in the intervening sulci.  7/8 BLE Venous dopplers: Neg for DVT  7/10 CTA Head: Decreased flow in the anterior cerebral arteries with high-grade narrowing   versus short-segment occlusion on the left similar to previous.  High-grade narrowing versus short-segment occlusion M2 segment left middle   cerebral artery increased from previous.    Barriers to Discharge:  Scheduled zofran started for in the mornings as pt was having difficulty taking pills only in morning d/t nausea. Remains on 3% saline drip with Q4H Na+ levels.     Ox4. Follows commands. Afebrile. NSR. On room air. SLP/PT/OT. Telemetry, PICC, neuro checks, n/v checks, SCDs. Low stim guidelines. Intensivist and Neuro Intervention following. 3% saline @ 75 ml/hr, norvasc, asa, wellbutrin xl, colace, pepcid, sq heparin, lisinopril, atarax,prn IV toradol, methadone, movantik, nimotop, zofran, prn zofran, lyrica, senna, Electrolyte replacement protocols. Na+ 143, hgb 8.9.       PCP: Kathy Buenrostro, APRN - CNP  Readmission Risk Score: 10.4    Patient Goals/Plan/Treatment Preferences: From home w/daughters. Uses a cane. Monitor therapy evals for recommendations.

## 2024-07-12 LAB
ANION GAP SERPL CALC-SCNC: 10 MEQ/L (ref 8–16)
BUN SERPL-MCNC: 12 MG/DL (ref 7–22)
CALCIUM SERPL-MCNC: 8.7 MG/DL (ref 8.5–10.5)
CHLORIDE SERPL-SCNC: 109 MEQ/L (ref 98–111)
CO2 SERPL-SCNC: 24 MEQ/L (ref 23–33)
CREAT SERPL-MCNC: 0.8 MG/DL (ref 0.4–1.2)
DEPRECATED RDW RBC AUTO: 47.4 FL (ref 35–45)
ERYTHROCYTE [DISTWIDTH] IN BLOOD BY AUTOMATED COUNT: 14.9 % (ref 11.5–14.5)
GFR SERPL CREATININE-BSD FRML MDRD: 90 ML/MIN/1.73M2
GLUCOSE SERPL-MCNC: 96 MG/DL (ref 70–108)
HCT VFR BLD AUTO: 28.1 % (ref 37–47)
HGB BLD-MCNC: 9.3 GM/DL (ref 12–16)
MCH RBC QN AUTO: 29.1 PG (ref 26–33)
MCHC RBC AUTO-ENTMCNC: 33.1 GM/DL (ref 32.2–35.5)
MCV RBC AUTO: 87.8 FL (ref 81–99)
PLATELET # BLD AUTO: 250 THOU/MM3 (ref 130–400)
PMV BLD AUTO: 9.6 FL (ref 9.4–12.4)
POTASSIUM SERPL-SCNC: 3.9 MEQ/L (ref 3.5–5.2)
RBC # BLD AUTO: 3.2 MILL/MM3 (ref 4.2–5.4)
REASON FOR REJECTION: NORMAL
REJECTED TEST: NORMAL
SODIUM SERPL-SCNC: 138 MEQ/L (ref 135–145)
SODIUM SERPL-SCNC: 138 MEQ/L (ref 135–145)
SODIUM SERPL-SCNC: 139 MEQ/L (ref 135–145)
SODIUM SERPL-SCNC: 140 MEQ/L (ref 135–145)
SODIUM SERPL-SCNC: 143 MEQ/L (ref 135–145)
SODIUM SERPL-SCNC: 144 MEQ/L (ref 135–145)
WBC # BLD AUTO: 10.4 THOU/MM3 (ref 4.8–10.8)

## 2024-07-12 PROCEDURE — 2580000003 HC RX 258

## 2024-07-12 PROCEDURE — 6360000002 HC RX W HCPCS

## 2024-07-12 PROCEDURE — 2000000000 HC ICU R&B

## 2024-07-12 PROCEDURE — 99291 CRITICAL CARE FIRST HOUR: CPT | Performed by: INTERNAL MEDICINE

## 2024-07-12 PROCEDURE — 99221 1ST HOSP IP/OBS SF/LOW 40: CPT | Performed by: NURSE PRACTITIONER

## 2024-07-12 PROCEDURE — 6370000000 HC RX 637 (ALT 250 FOR IP): Performed by: NURSE PRACTITIONER

## 2024-07-12 PROCEDURE — 2580000003 HC RX 258: Performed by: PSYCHIATRY & NEUROLOGY

## 2024-07-12 PROCEDURE — 36415 COLL VENOUS BLD VENIPUNCTURE: CPT

## 2024-07-12 PROCEDURE — 84295 ASSAY OF SERUM SODIUM: CPT

## 2024-07-12 PROCEDURE — 97530 THERAPEUTIC ACTIVITIES: CPT

## 2024-07-12 PROCEDURE — 97130 THER IVNTJ EA ADDL 15 MIN: CPT

## 2024-07-12 PROCEDURE — 97116 GAIT TRAINING THERAPY: CPT

## 2024-07-12 PROCEDURE — 85027 COMPLETE CBC AUTOMATED: CPT

## 2024-07-12 PROCEDURE — 97129 THER IVNTJ 1ST 15 MIN: CPT

## 2024-07-12 PROCEDURE — 6370000000 HC RX 637 (ALT 250 FOR IP)

## 2024-07-12 PROCEDURE — 80048 BASIC METABOLIC PNL TOTAL CA: CPT

## 2024-07-12 PROCEDURE — 36592 COLLECT BLOOD FROM PICC: CPT

## 2024-07-12 RX ADMIN — PREGABALIN 75 MG: 75 CAPSULE ORAL at 13:58

## 2024-07-12 RX ADMIN — AMLODIPINE BESYLATE 10 MG: 10 TABLET ORAL at 09:26

## 2024-07-12 RX ADMIN — PREGABALIN 75 MG: 75 CAPSULE ORAL at 09:19

## 2024-07-12 RX ADMIN — FAMOTIDINE 20 MG: 20 TABLET, FILM COATED ORAL at 20:30

## 2024-07-12 RX ADMIN — HYDROXYZINE HYDROCHLORIDE 50 MG: 25 TABLET ORAL at 20:30

## 2024-07-12 RX ADMIN — ONDANSETRON 4 MG: 4 TABLET, ORALLY DISINTEGRATING ORAL at 05:13

## 2024-07-12 RX ADMIN — ASPIRIN 81 MG 81 MG: 81 TABLET ORAL at 09:19

## 2024-07-12 RX ADMIN — KETOROLAC TROMETHAMINE 30 MG: 30 INJECTION, SOLUTION INTRAMUSCULAR at 22:02

## 2024-07-12 RX ADMIN — HEPARIN SODIUM 5000 UNITS: 5000 INJECTION INTRAVENOUS; SUBCUTANEOUS at 22:03

## 2024-07-12 RX ADMIN — NIMODIPINE 60 MG: 30 CAPSULE, LIQUID FILLED ORAL at 23:39

## 2024-07-12 RX ADMIN — BUPROPION HYDROCHLORIDE 300 MG: 300 TABLET, EXTENDED RELEASE ORAL at 09:20

## 2024-07-12 RX ADMIN — NIMODIPINE 60 MG: 30 CAPSULE, LIQUID FILLED ORAL at 20:30

## 2024-07-12 RX ADMIN — NIMODIPINE 60 MG: 30 CAPSULE, LIQUID FILLED ORAL at 16:03

## 2024-07-12 RX ADMIN — FAMOTIDINE 20 MG: 20 TABLET, FILM COATED ORAL at 09:19

## 2024-07-12 RX ADMIN — HEPARIN SODIUM 5000 UNITS: 5000 INJECTION INTRAVENOUS; SUBCUTANEOUS at 06:12

## 2024-07-12 RX ADMIN — KETOROLAC TROMETHAMINE 30 MG: 30 INJECTION, SOLUTION INTRAMUSCULAR at 00:44

## 2024-07-12 RX ADMIN — SODIUM CHLORIDE, PRESERVATIVE FREE 10 ML: 5 INJECTION INTRAVENOUS at 20:32

## 2024-07-12 RX ADMIN — NIMODIPINE 60 MG: 30 CAPSULE, LIQUID FILLED ORAL at 05:13

## 2024-07-12 RX ADMIN — SODIUM CHLORIDE, PRESERVATIVE FREE 10 ML: 5 INJECTION INTRAVENOUS at 09:26

## 2024-07-12 RX ADMIN — SODIUM CHLORIDE 75 ML/HR: 3 INJECTION, SOLUTION INTRAVENOUS at 09:07

## 2024-07-12 RX ADMIN — NIMODIPINE 60 MG: 30 CAPSULE, LIQUID FILLED ORAL at 09:19

## 2024-07-12 RX ADMIN — METHADONE HYDROCHLORIDE 110 MG: 10 SOLUTION ORAL at 09:26

## 2024-07-12 RX ADMIN — KETOROLAC TROMETHAMINE 30 MG: 30 INJECTION, SOLUTION INTRAMUSCULAR at 16:03

## 2024-07-12 RX ADMIN — NIMODIPINE 60 MG: 30 CAPSULE, LIQUID FILLED ORAL at 00:04

## 2024-07-12 RX ADMIN — NIMODIPINE 60 MG: 30 CAPSULE, LIQUID FILLED ORAL at 12:17

## 2024-07-12 RX ADMIN — SODIUM CHLORIDE 50 ML/HR: 3 INJECTION, SOLUTION INTRAVENOUS at 16:07

## 2024-07-12 RX ADMIN — LISINOPRIL 10 MG: 10 TABLET ORAL at 09:26

## 2024-07-12 RX ADMIN — KETOROLAC TROMETHAMINE 30 MG: 30 INJECTION, SOLUTION INTRAMUSCULAR at 10:14

## 2024-07-12 RX ADMIN — PREGABALIN 75 MG: 75 CAPSULE ORAL at 20:30

## 2024-07-12 ASSESSMENT — PAIN SCALES - GENERAL
PAINLEVEL_OUTOF10: 3
PAINLEVEL_OUTOF10: 2
PAINLEVEL_OUTOF10: 2
PAINLEVEL_OUTOF10: 0
PAINLEVEL_OUTOF10: 0
PAINLEVEL_OUTOF10: 2
PAINLEVEL_OUTOF10: 3
PAINLEVEL_OUTOF10: 10
PAINLEVEL_OUTOF10: 0
PAINLEVEL_OUTOF10: 7

## 2024-07-12 ASSESSMENT — PAIN DESCRIPTION - LOCATION
LOCATION: HEAD

## 2024-07-12 ASSESSMENT — PAIN DESCRIPTION - ORIENTATION: ORIENTATION: MID

## 2024-07-12 ASSESSMENT — PAIN SCALES - WONG BAKER: WONGBAKER_NUMERICALRESPONSE: NO HURT

## 2024-07-12 ASSESSMENT — PAIN DESCRIPTION - DESCRIPTORS
DESCRIPTORS: ACHING

## 2024-07-12 ASSESSMENT — PAIN DESCRIPTION - PAIN TYPE: TYPE: ACUTE PAIN

## 2024-07-12 NOTE — CARE COORDINATION
7/12/24, 3:54 PM EDT    DISCHARGE ON GOING EVALUATION    [facility] day: 12  Location: -06/006-A Reason for admit: SAH (subarachnoid hemorrhage) (HCC) [I60.9]  Subarachnoid bleed (HCC) [I60.9]     Procedures:   6/30 ACOM aneurysm coiling   7/3 PICC right basilic    Imaging since last note: none    Barriers to Discharge: Remains on 3% saline drip with Q4H Na+ levels.      Ox4. Follows commands. Afebrile. NSR. On room air. SLP/PT/OT. Telemetry, PICC, neuro checks, n/v checks, SCDs. Low stim guidelines. Intensivist and Neuro Intervention following. 3% saline @ 50 ml/hr, norvasc, asa, wellbutrin xl, colace, pepcid, sq heparin, lisinopril, atarax,prn IV toradol, methadone, movantik, nimotop, zofran, prn zofran, lyrica, senna, Electrolyte replacement protocols. Na+ 140    PCP: Kathy Buenrostro, APRN - CNP  Readmission Risk Score: 8.1    Patient Goals/Plan/Treatment Preferences: From home w/daughters. Uses a cane. Monitor therapy evals for recommendations.

## 2024-07-13 LAB
ANION GAP SERPL CALC-SCNC: 11 MEQ/L (ref 8–16)
BUN SERPL-MCNC: 13 MG/DL (ref 7–22)
CALCIUM SERPL-MCNC: 8.5 MG/DL (ref 8.5–10.5)
CHLORIDE SERPL-SCNC: 108 MEQ/L (ref 98–111)
CO2 SERPL-SCNC: 23 MEQ/L (ref 23–33)
CREAT SERPL-MCNC: 0.8 MG/DL (ref 0.4–1.2)
DEPRECATED RDW RBC AUTO: 49.1 FL (ref 35–45)
ERYTHROCYTE [DISTWIDTH] IN BLOOD BY AUTOMATED COUNT: 15.3 % (ref 11.5–14.5)
GFR SERPL CREATININE-BSD FRML MDRD: 90 ML/MIN/1.73M2
GLUCOSE SERPL-MCNC: 109 MG/DL (ref 70–108)
HCT VFR BLD AUTO: 27.4 % (ref 37–47)
HGB BLD-MCNC: 8.7 GM/DL (ref 12–16)
MCH RBC QN AUTO: 28.5 PG (ref 26–33)
MCHC RBC AUTO-ENTMCNC: 31.8 GM/DL (ref 32.2–35.5)
MCV RBC AUTO: 89.8 FL (ref 81–99)
PLATELET # BLD AUTO: 225 THOU/MM3 (ref 130–400)
PMV BLD AUTO: 9.5 FL (ref 9.4–12.4)
POTASSIUM SERPL-SCNC: 3.7 MEQ/L (ref 3.5–5.2)
RBC # BLD AUTO: 3.05 MILL/MM3 (ref 4.2–5.4)
SODIUM SERPL-SCNC: 138 MEQ/L (ref 135–145)
SODIUM SERPL-SCNC: 140 MEQ/L (ref 135–145)
SODIUM SERPL-SCNC: 141 MEQ/L (ref 135–145)
SODIUM SERPL-SCNC: 142 MEQ/L (ref 135–145)
SODIUM SERPL-SCNC: 143 MEQ/L (ref 135–145)
WBC # BLD AUTO: 11.4 THOU/MM3 (ref 4.8–10.8)

## 2024-07-13 PROCEDURE — 6370000000 HC RX 637 (ALT 250 FOR IP): Performed by: NURSE PRACTITIONER

## 2024-07-13 PROCEDURE — 84295 ASSAY OF SERUM SODIUM: CPT

## 2024-07-13 PROCEDURE — 99232 SBSQ HOSP IP/OBS MODERATE 35: CPT

## 2024-07-13 PROCEDURE — 6360000002 HC RX W HCPCS

## 2024-07-13 PROCEDURE — 2580000003 HC RX 258

## 2024-07-13 PROCEDURE — 80048 BASIC METABOLIC PNL TOTAL CA: CPT

## 2024-07-13 PROCEDURE — 36592 COLLECT BLOOD FROM PICC: CPT

## 2024-07-13 PROCEDURE — 6370000000 HC RX 637 (ALT 250 FOR IP)

## 2024-07-13 PROCEDURE — 2580000003 HC RX 258: Performed by: PSYCHIATRY & NEUROLOGY

## 2024-07-13 PROCEDURE — 85027 COMPLETE CBC AUTOMATED: CPT

## 2024-07-13 PROCEDURE — 2000000000 HC ICU R&B

## 2024-07-13 PROCEDURE — 36415 COLL VENOUS BLD VENIPUNCTURE: CPT

## 2024-07-13 RX ORDER — 3% SODIUM CHLORIDE 3 G/100ML
25 INJECTION, SOLUTION INTRAVENOUS CONTINUOUS
Status: DISCONTINUED | OUTPATIENT
Start: 2024-07-13 | End: 2024-07-14

## 2024-07-13 RX ADMIN — NIMODIPINE 60 MG: 30 CAPSULE, LIQUID FILLED ORAL at 17:12

## 2024-07-13 RX ADMIN — NIMODIPINE 60 MG: 30 CAPSULE, LIQUID FILLED ORAL at 04:13

## 2024-07-13 RX ADMIN — SODIUM CHLORIDE, PRESERVATIVE FREE 10 ML: 5 INJECTION INTRAVENOUS at 09:48

## 2024-07-13 RX ADMIN — ONDANSETRON 4 MG: 4 TABLET, ORALLY DISINTEGRATING ORAL at 04:14

## 2024-07-13 RX ADMIN — PREGABALIN 75 MG: 75 CAPSULE ORAL at 09:48

## 2024-07-13 RX ADMIN — HYDRALAZINE HYDROCHLORIDE 25 MG: 25 TABLET ORAL at 20:02

## 2024-07-13 RX ADMIN — LISINOPRIL 10 MG: 10 TABLET ORAL at 09:45

## 2024-07-13 RX ADMIN — HEPARIN SODIUM 5000 UNITS: 5000 INJECTION INTRAVENOUS; SUBCUTANEOUS at 13:19

## 2024-07-13 RX ADMIN — METHADONE HYDROCHLORIDE 110 MG: 10 SOLUTION ORAL at 10:13

## 2024-07-13 RX ADMIN — ASPIRIN 81 MG 81 MG: 81 TABLET ORAL at 09:48

## 2024-07-13 RX ADMIN — HEPARIN SODIUM 5000 UNITS: 5000 INJECTION INTRAVENOUS; SUBCUTANEOUS at 21:47

## 2024-07-13 RX ADMIN — SODIUM CHLORIDE 50 ML/HR: 3 INJECTION, SOLUTION INTRAVENOUS at 03:01

## 2024-07-13 RX ADMIN — NIMODIPINE 60 MG: 30 CAPSULE, LIQUID FILLED ORAL at 13:19

## 2024-07-13 RX ADMIN — KETOROLAC TROMETHAMINE 30 MG: 30 INJECTION, SOLUTION INTRAMUSCULAR at 20:09

## 2024-07-13 RX ADMIN — SENNOSIDES 8.6 MG: 8.6 TABLET, FILM COATED ORAL at 20:02

## 2024-07-13 RX ADMIN — KETOROLAC TROMETHAMINE 30 MG: 30 INJECTION, SOLUTION INTRAMUSCULAR at 10:52

## 2024-07-13 RX ADMIN — HYDRALAZINE HYDROCHLORIDE 25 MG: 25 TABLET ORAL at 09:45

## 2024-07-13 RX ADMIN — FAMOTIDINE 20 MG: 20 TABLET, FILM COATED ORAL at 09:48

## 2024-07-13 RX ADMIN — PREGABALIN 75 MG: 75 CAPSULE ORAL at 20:02

## 2024-07-13 RX ADMIN — AMLODIPINE BESYLATE 10 MG: 10 TABLET ORAL at 09:45

## 2024-07-13 RX ADMIN — NIMODIPINE 60 MG: 30 CAPSULE, LIQUID FILLED ORAL at 09:44

## 2024-07-13 RX ADMIN — FAMOTIDINE 20 MG: 20 TABLET, FILM COATED ORAL at 20:03

## 2024-07-13 RX ADMIN — BUPROPION HYDROCHLORIDE 300 MG: 300 TABLET, EXTENDED RELEASE ORAL at 09:44

## 2024-07-13 RX ADMIN — PREGABALIN 75 MG: 75 CAPSULE ORAL at 13:19

## 2024-07-13 RX ADMIN — NIMODIPINE 60 MG: 30 CAPSULE, LIQUID FILLED ORAL at 20:02

## 2024-07-13 RX ADMIN — KETOROLAC TROMETHAMINE 30 MG: 30 INJECTION, SOLUTION INTRAMUSCULAR at 04:14

## 2024-07-13 RX ADMIN — HEPARIN SODIUM 5000 UNITS: 5000 INJECTION INTRAVENOUS; SUBCUTANEOUS at 06:46

## 2024-07-13 RX ADMIN — SODIUM CHLORIDE, PRESERVATIVE FREE 10 ML: 5 INJECTION INTRAVENOUS at 20:55

## 2024-07-13 RX ADMIN — DOCUSATE SODIUM 100 MG: 100 CAPSULE, LIQUID FILLED ORAL at 20:02

## 2024-07-13 RX ADMIN — HYDROXYZINE HYDROCHLORIDE 50 MG: 25 TABLET ORAL at 20:05

## 2024-07-13 ASSESSMENT — PAIN DESCRIPTION - ORIENTATION
ORIENTATION: RIGHT;UPPER
ORIENTATION: RIGHT;UPPER

## 2024-07-13 ASSESSMENT — PAIN DESCRIPTION - FREQUENCY: FREQUENCY: CONTINUOUS

## 2024-07-13 ASSESSMENT — PAIN SCALES - GENERAL
PAINLEVEL_OUTOF10: 8
PAINLEVEL_OUTOF10: 6
PAINLEVEL_OUTOF10: 2
PAINLEVEL_OUTOF10: 0
PAINLEVEL_OUTOF10: 2
PAINLEVEL_OUTOF10: 7

## 2024-07-13 ASSESSMENT — PAIN DESCRIPTION - ONSET: ONSET: ON-GOING

## 2024-07-13 ASSESSMENT — PAIN SCALES - WONG BAKER: WONGBAKER_NUMERICALRESPONSE: NO HURT

## 2024-07-13 ASSESSMENT — PAIN DESCRIPTION - DESCRIPTORS
DESCRIPTORS: ACHING
DESCRIPTORS: ACHING;GNAWING;PENETRATING

## 2024-07-13 ASSESSMENT — PAIN - FUNCTIONAL ASSESSMENT
PAIN_FUNCTIONAL_ASSESSMENT: ACTIVITIES ARE NOT PREVENTED
PAIN_FUNCTIONAL_ASSESSMENT: ACTIVITIES ARE NOT PREVENTED

## 2024-07-13 ASSESSMENT — PAIN DESCRIPTION - LOCATION
LOCATION: HEAD
LOCATION: HEAD

## 2024-07-13 ASSESSMENT — PAIN DESCRIPTION - PAIN TYPE: TYPE: ACUTE PAIN

## 2024-07-14 ENCOUNTER — APPOINTMENT (OUTPATIENT)
Dept: CT IMAGING | Age: 49
DRG: 030 | End: 2024-07-14
Payer: COMMERCIAL

## 2024-07-14 VITALS
BODY MASS INDEX: 36.29 KG/M2 | HEIGHT: 59 IN | OXYGEN SATURATION: 99 % | SYSTOLIC BLOOD PRESSURE: 143 MMHG | DIASTOLIC BLOOD PRESSURE: 55 MMHG | RESPIRATION RATE: 16 BRPM | WEIGHT: 180 LBS | TEMPERATURE: 98.2 F | HEART RATE: 69 BPM

## 2024-07-14 LAB
ANION GAP SERPL CALC-SCNC: 12 MEQ/L (ref 8–16)
BUN SERPL-MCNC: 17 MG/DL (ref 7–22)
CALCIUM SERPL-MCNC: 8.8 MG/DL (ref 8.5–10.5)
CHLORIDE SERPL-SCNC: 104 MEQ/L (ref 98–111)
CO2 SERPL-SCNC: 23 MEQ/L (ref 23–33)
CREAT SERPL-MCNC: 1.1 MG/DL (ref 0.4–1.2)
DEPRECATED RDW RBC AUTO: 50 FL (ref 35–45)
ERYTHROCYTE [DISTWIDTH] IN BLOOD BY AUTOMATED COUNT: 15.5 % (ref 11.5–14.5)
GFR SERPL CREATININE-BSD FRML MDRD: 61 ML/MIN/1.73M2
GLUCOSE SERPL-MCNC: 94 MG/DL (ref 70–108)
HCT VFR BLD AUTO: 27 % (ref 37–47)
HGB BLD-MCNC: 8.5 GM/DL (ref 12–16)
MCH RBC QN AUTO: 28.2 PG (ref 26–33)
MCHC RBC AUTO-ENTMCNC: 31.5 GM/DL (ref 32.2–35.5)
MCV RBC AUTO: 89.7 FL (ref 81–99)
PLATELET # BLD AUTO: 225 THOU/MM3 (ref 130–400)
PMV BLD AUTO: 9.4 FL (ref 9.4–12.4)
POTASSIUM SERPL-SCNC: 4 MEQ/L (ref 3.5–5.2)
RBC # BLD AUTO: 3.01 MILL/MM3 (ref 4.2–5.4)
SODIUM SERPL-SCNC: 138 MEQ/L (ref 135–145)
SODIUM SERPL-SCNC: 138 MEQ/L (ref 135–145)
SODIUM SERPL-SCNC: 139 MEQ/L (ref 135–145)
WBC # BLD AUTO: 11.7 THOU/MM3 (ref 4.8–10.8)

## 2024-07-14 PROCEDURE — 2580000003 HC RX 258: Performed by: PSYCHIATRY & NEUROLOGY

## 2024-07-14 PROCEDURE — 99232 SBSQ HOSP IP/OBS MODERATE 35: CPT

## 2024-07-14 PROCEDURE — 6370000000 HC RX 637 (ALT 250 FOR IP)

## 2024-07-14 PROCEDURE — 70496 CT ANGIOGRAPHY HEAD: CPT

## 2024-07-14 PROCEDURE — 6370000000 HC RX 637 (ALT 250 FOR IP): Performed by: NURSE PRACTITIONER

## 2024-07-14 PROCEDURE — 6370000000 HC RX 637 (ALT 250 FOR IP): Performed by: PSYCHIATRY & NEUROLOGY

## 2024-07-14 PROCEDURE — 6360000002 HC RX W HCPCS

## 2024-07-14 PROCEDURE — 6360000004 HC RX CONTRAST MEDICATION

## 2024-07-14 PROCEDURE — 84295 ASSAY OF SERUM SODIUM: CPT

## 2024-07-14 PROCEDURE — 80048 BASIC METABOLIC PNL TOTAL CA: CPT

## 2024-07-14 PROCEDURE — 36592 COLLECT BLOOD FROM PICC: CPT

## 2024-07-14 PROCEDURE — 36415 COLL VENOUS BLD VENIPUNCTURE: CPT

## 2024-07-14 PROCEDURE — 85027 COMPLETE CBC AUTOMATED: CPT

## 2024-07-14 RX ORDER — AMLODIPINE BESYLATE 10 MG/1
10 TABLET ORAL DAILY
Qty: 30 TABLET | Refills: 3 | Status: SHIPPED | OUTPATIENT
Start: 2024-07-15 | End: 2024-07-14

## 2024-07-14 RX ORDER — LISINOPRIL 10 MG/1
10 TABLET ORAL DAILY
Qty: 30 TABLET | Refills: 3 | Status: SHIPPED | OUTPATIENT
Start: 2024-07-15 | End: 2024-07-14

## 2024-07-14 RX ORDER — PSEUDOEPHEDRINE HCL 30 MG
100 TABLET ORAL 2 TIMES DAILY
Qty: 30 CAPSULE | Refills: 1 | COMMUNITY
Start: 2024-07-14 | End: 2024-07-14

## 2024-07-14 RX ORDER — SENNOSIDES A AND B 8.6 MG/1
1 TABLET, FILM COATED ORAL NIGHTLY
Qty: 30 TABLET | Refills: 0 | COMMUNITY
Start: 2024-07-14 | End: 2024-07-14

## 2024-07-14 RX ORDER — NIMODIPINE 30 MG/1
60 CAPSULE, LIQUID FILLED ORAL EVERY 4 HOURS
Qty: 84 CAPSULE | Refills: 0 | Status: SHIPPED | OUTPATIENT
Start: 2024-07-14 | End: 2024-07-14

## 2024-07-14 RX ORDER — PSEUDOEPHEDRINE HCL 30 MG
100 TABLET ORAL 2 TIMES DAILY
Qty: 30 CAPSULE | Refills: 1 | Status: SHIPPED | OUTPATIENT
Start: 2024-07-14 | End: 2024-07-14

## 2024-07-14 RX ORDER — NIMODIPINE 30 MG/1
60 CAPSULE, LIQUID FILLED ORAL EVERY 4 HOURS
Qty: 84 CAPSULE | Refills: 0 | Status: SHIPPED | OUTPATIENT
Start: 2024-07-14 | End: 2024-07-18

## 2024-07-14 RX ORDER — ASPIRIN 81 MG/1
81 TABLET, CHEWABLE ORAL DAILY
Qty: 30 TABLET | Refills: 3 | Status: SHIPPED | OUTPATIENT
Start: 2024-07-15 | End: 2024-07-14

## 2024-07-14 RX ORDER — AMLODIPINE BESYLATE 10 MG/1
10 TABLET ORAL DAILY
Qty: 30 TABLET | Refills: 3 | Status: SHIPPED | OUTPATIENT
Start: 2024-07-14

## 2024-07-14 RX ORDER — ASPIRIN 81 MG/1
81 TABLET, CHEWABLE ORAL DAILY
Qty: 30 TABLET | Refills: 3 | Status: SHIPPED | OUTPATIENT
Start: 2024-07-14

## 2024-07-14 RX ORDER — LISINOPRIL 10 MG/1
10 TABLET ORAL DAILY
Qty: 30 TABLET | Refills: 3 | Status: SHIPPED | OUTPATIENT
Start: 2024-07-14

## 2024-07-14 RX ORDER — SENNOSIDES A AND B 8.6 MG/1
1 TABLET, FILM COATED ORAL NIGHTLY
Qty: 30 TABLET | Refills: 0 | COMMUNITY
Start: 2024-07-14 | End: 2024-08-13

## 2024-07-14 RX ORDER — PSEUDOEPHEDRINE HCL 30 MG
100 TABLET ORAL 2 TIMES DAILY
Qty: 30 CAPSULE | Refills: 1 | COMMUNITY
Start: 2024-07-14

## 2024-07-14 RX ADMIN — BUPROPION HYDROCHLORIDE 300 MG: 300 TABLET, EXTENDED RELEASE ORAL at 09:16

## 2024-07-14 RX ADMIN — KETOROLAC TROMETHAMINE 30 MG: 30 INJECTION, SOLUTION INTRAMUSCULAR at 02:30

## 2024-07-14 RX ADMIN — ONDANSETRON 4 MG: 4 TABLET, ORALLY DISINTEGRATING ORAL at 04:06

## 2024-07-14 RX ADMIN — NIMODIPINE 60 MG: 30 CAPSULE, LIQUID FILLED ORAL at 04:06

## 2024-07-14 RX ADMIN — PREGABALIN 75 MG: 75 CAPSULE ORAL at 09:14

## 2024-07-14 RX ADMIN — NIMODIPINE 60 MG: 30 CAPSULE, LIQUID FILLED ORAL at 09:15

## 2024-07-14 RX ADMIN — LISINOPRIL 10 MG: 10 TABLET ORAL at 09:16

## 2024-07-14 RX ADMIN — AMLODIPINE BESYLATE 10 MG: 10 TABLET ORAL at 09:16

## 2024-07-14 RX ADMIN — FAMOTIDINE 20 MG: 20 TABLET, FILM COATED ORAL at 09:14

## 2024-07-14 RX ADMIN — HEPARIN SODIUM 5000 UNITS: 5000 INJECTION INTRAVENOUS; SUBCUTANEOUS at 05:37

## 2024-07-14 RX ADMIN — ASPIRIN 81 MG 81 MG: 81 TABLET ORAL at 09:14

## 2024-07-14 RX ADMIN — IOPAMIDOL 80 ML: 755 INJECTION, SOLUTION INTRAVENOUS at 00:53

## 2024-07-14 RX ADMIN — ACETAMINOPHEN 650 MG: 325 TABLET ORAL at 01:33

## 2024-07-14 RX ADMIN — METHADONE HYDROCHLORIDE 110 MG: 10 SOLUTION ORAL at 09:17

## 2024-07-14 RX ADMIN — SODIUM CHLORIDE, PRESERVATIVE FREE 10 ML: 5 INJECTION INTRAVENOUS at 09:17

## 2024-07-14 RX ADMIN — KETOROLAC TROMETHAMINE 30 MG: 30 INJECTION, SOLUTION INTRAMUSCULAR at 12:05

## 2024-07-14 RX ADMIN — NIMODIPINE 60 MG: 30 CAPSULE, LIQUID FILLED ORAL at 12:02

## 2024-07-14 RX ADMIN — NIMODIPINE 60 MG: 30 CAPSULE, LIQUID FILLED ORAL at 00:17

## 2024-07-14 ASSESSMENT — PAIN DESCRIPTION - FREQUENCY
FREQUENCY: CONTINUOUS

## 2024-07-14 ASSESSMENT — PAIN DESCRIPTION - ONSET
ONSET: ON-GOING

## 2024-07-14 ASSESSMENT — PAIN DESCRIPTION - LOCATION
LOCATION: HEAD

## 2024-07-14 ASSESSMENT — PAIN DESCRIPTION - ORIENTATION
ORIENTATION: RIGHT;UPPER
ORIENTATION: RIGHT

## 2024-07-14 ASSESSMENT — PAIN DESCRIPTION - DESCRIPTORS
DESCRIPTORS: ACHING;GNAWING;SHOOTING
DESCRIPTORS: ACHING

## 2024-07-14 ASSESSMENT — PAIN - FUNCTIONAL ASSESSMENT
PAIN_FUNCTIONAL_ASSESSMENT: ACTIVITIES ARE NOT PREVENTED

## 2024-07-14 ASSESSMENT — PAIN DESCRIPTION - PAIN TYPE
TYPE: ACUTE PAIN

## 2024-07-14 ASSESSMENT — PAIN SCALES - GENERAL
PAINLEVEL_OUTOF10: 4
PAINLEVEL_OUTOF10: 7
PAINLEVEL_OUTOF10: 3
PAINLEVEL_OUTOF10: 7
PAINLEVEL_OUTOF10: 3

## 2024-07-14 NOTE — PROGRESS NOTES
07/06/24 1533   Encounter Summary   Encounter Overview/Reason Attempted Encounter   Service Provided For Patient not available   Referral/Consult From Rounding   Last Encounter  07/06/24   Complexity of Encounter Low   Begin Time 1527   End Time  1530   Total Time Calculated 3 min   Assessment/Intervention/Outcome   Assessment Unable to assess     This is an attempted spiritual health encounter as a part of rounds. PatientMarika, was resting at this time.     team will remain available to support patient/family, PRN.     KATRINA Munoz.Umer     Spiritual Care Department  [address] [address]  [phone]        
  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  Complicated by subarachnoid hemorrhage.  She states she still has some headaches but improving overall.  Continue to monitor for signs of vasospasm  Continue nimotop for 21 days  Hypertonic saline discontinued 7/12  [facility] on board, recommendations appreciated  Subarachnoid hemorrhage: Found on CT scan, repeat CT scan showed stability.  Neurology following, recommendations appreciated that  Goal -160  Continue amlodipine unless hypotensive  HTN: Patient has a history of hypertension, not currently on any home medications  Goal -160  Cardene was started overnight but able to be weaned off  Continue lisinopril amlodipine and as needed hydralazine  Polyuria: Patient was on hypertonic saline which has since been discontinued.  Suspect an element of cerebral salt wasting.  Want to avoid hyponatremia.  Continue to monitor I's and O's  Try to maintain net fluid balance  Constipation: Can continue Movantik, senna and Colace.  Patient still has not had bowel movement  Severe aortic stenosis: Echo 7/2/2024 showed aortic valve area 0.9 to 1.0 cm².  Patient will need to follow-up outpatient with cardiology  Anxiety: Continue Wellbutrin  Polysubstance abuse: Patient currently on methadone  Leukocytosis: Resolved    INITIAL H AND P AND ICU COURSE:  Per HPI \"Marika Peters is a 48 y/o female active-smoker with PMH of HTN, anxiety, and polysubstance abuse (currently on methadone).      Patient is currently admitted to [facility] ICU since 6/30/24, following a ruptured ACOM aneurysm coiling by the neuro-interventional team, with concurrent subarachnoid hemorrhage. Patient is in ICU 
  CRITICAL CARE PROGRESS NOTE      Patient:  Marika Peters    Unit/Bed:4D-06/006-A  YOB: 1975  MRN: [id number]   PCP: Kathy Buenrostro APRN - CNP  Date of Admission: 6/30/2024  Chief Complaint:-Headache    Assessment and Plan:    Ruptured right ACOM aneurysm: S/p coiling 6/30.  Complicated by subarachnoid hemorrhage.  She states she still has some headaches but improving overall.  Patient will be in ICU for a total of 21 days  Continue to monitor for signs of vasospasm  Continue pneumonia pain for 21 days  Hypertonic saline discontinued 7/12  Goal -160  Neurochecks  Neurology on board, recommendations appreciated  Subarachnoid hemorrhage: Found on CT scan, repeat CT scan showed stability.  Neurology following, recommendations appreciated that  Goal -160  Continue amlodipine unless hypotensive  HTN: Patient has a history of hypertension, not currently on any home medications  Goal -160  Cardene was started overnight but able to be weaned off  Continue lisinopril amlodipine and as needed hydralazine  Polyuria: Patient was on hypertonic saline which has since been discontinued.  Suspect an element of cerebral salt wasting.  Want to avoid hyponatremia.  Continue to monitor I's and O's  Try to maintain net fluid balance  Constipation: Can continue Movantik, senna and Colace.  Patient still has not had bowel movement  Severe aortic stenosis: Echo 7/2/2024 showed aortic valve area 0.9 to 1.0 cm².  Patient will need to follow-up outpatient with cardiology  Anxiety: Continue Wellbutrin  Polysubstance abuse: Patient currently on methadone  Leukocytosis: Resolved    INITIAL H AND P AND ICU COURSE:  Per HPI \"Marika Peters is a 48 y/o female active-smoker with PMH of HTN, anxiety, and polysubstance abuse (currently on methadone).      Patient is currently admitted to [facility] ICU since 6/30/24, following a ruptured ACOM aneurysm coiling by the neuro-interventional team, with 
  CRITICAL CARE PROGRESS NOTE      Patient:  Marika Peters    Unit/Bed:4D-06/006-A  YOB: 1975  MRN: [id number]   PCP: Kathy Buenrostro APRN - CNP  Date of Admission: 6/30/2024  Chief Complaint:- headache    Assessment and Plan:    Acute subarachnoid hemorrhage: stable.  2/2 ACOM aneurysmal rupture. s/p coiling on 6/30/24.  CT (7-2-24) revealed extensive subarachnoid hemorrhage and diminished attenuation in the white matter.  Continue methylprednisolone 4 mg nightly for prevention of aseptic meningitis. monitor GCS, decreases by 1->CT to assess for hydrocephalus and determine the need for shunt. No vasospasm on TCD 7/3. Continue ASA 81. Continue nimotop x 21 days. Continue to monitor for 21 days in the ICU. BP goal <130 systolic.   Hyponatremia: monitor BMP for cerebral salt wasting secondary to subarachnoid hemorrhage, infusing hypertonic saline with sodium target of 140-155.   Headache: continue Lyrica, continue home dose methadone for pain.  Hypertension: wean cardene gtt. Oral med regimen includes amlodipine 10mg, lisinopril 10 daily, hydralazine 25 q 8 hrs.   History of polysubstance abuse: Patient is on home methadone therapy, after consulting neurology for pain control.  Patient may require higher doses of opiates for pain control.   Anxiety: home Wellbutrin  Hx opioid use disorder: continue home methadone.     INITIAL H AND P AND ICU COURSE:  48yo F w/significant PMHx of HTN, Tobacco use (1-PPD, ~30PY), and Polysubstance use who presented to [facility] ED c/o emesis and headache. EMS reports that pt took her methadone this morning around 1030 and took 2x 200mg caffeine pills today due to \"not having any energy\" and had HA a/w vomiting about 20-30 minutes after. Pt denies CP / SOB, HR wnl; EMS gave 4 mg zofran IV in route. CT H (+) SAH in both sylvian fissures as well as in the midline superiorly -> CTA head and neck completed and revealed ruptured ACOM aneurysm -> [facility] alert called -> going to 
  CRITICAL CARE PROGRESS NOTE      Patient:  Marika Peters    Unit/Bed:4D-06/006-A  YOB: 1975  MRN: [id number]   PCP: Kathy Buenrostro APRN - CNP  Date of Admission: 6/30/2024  Chief Complaint:- headache    Assessment and Plan:    Ruptured right ACOM aneurysm: Status post coiling by neurointerventional team on 6/30/2024.  Complicated by subarachnoid hemorrhage as below.  Patient continues to have on and off but regular headaches, which are reportedly unchanged in severity and unchanged from recent baseline postoperatively.  Undergoing as needed pain relieving medications.  Status post steroid treatment per neurology.  Monitor for any signs of vasospasm.  Continue Nimodipine for 21 days. On hypertonic 3% saline to avoid hyponatremia. Maintain euvolemia. Keep -160 mmHg per neuro. Neuro-checks. Neuro team following; appreciate assistance.   Subarachnoid hemorrhage: discussion as above. Stable by repeat CT head 7/7. Neurology following. BP control. Avoid morphine.   HTN: maintaining SBP goal of 120-160 mmHg per neurology rec's. S/p cardene infusion. Currently on lisinopril, amlodipine PO. PRN hydralazine. Do not d/c amlodipine unless hypotensive.  Polyuria: hypertonic saline increased to 125 mL/hr. Appears euvolemic on exam. Elevated urine Osm and urine sodium. Appears to be c/w cerebral salt wasting. Trend sodium level q4h and avoid hyponatremia. 2L free water restriction. Sodium 139 today. Monitor I/O and make sure relatively even daily fluid intake and output.   Leukocytosis: no fever. No infectious s/s. Recent ruptured ACOM aneurysm s/p coiling. Recent systemic steroids exposure. Trend CBC.   History of polysubstance abuse: Patient is on home methadone therapy.  Anxiety, history of: home Wellbutrin  Severe aortic stenosis: ARMANDO 0.9-1.0 sq cm on recent echo. Will need cardiology evaluation once recovered from acute illness.   Constipation: continue movantik, senna, colace. KUB wnl 7/6. No BM 
  CRITICAL CARE PROGRESS NOTE      Patient:  Marika Peters    Unit/Bed:4D-06/006-A  YOB: 1975  MRN: [id number]   PCP: Kathy Buenrostro APRN - CNP  Date of Admission: 6/30/2024  Chief Complaint:- headache    Assessment and Plan:    Ruptured right ACOM aneurysm: Status post coiling by neurointerventional team on 6/30/2024.  Complicated by subarachnoid hemorrhage as below.  Patient continues to have on and off but regular headaches, which are reportedly unchanged in severity and unchanged from recent baseline postoperatively.  Undergoing as needed pain relieving medications.  Status post steroid treatment per neurology.  Monitor for any signs of vasospasm.  Continue Nimodipine for 21 days. On hypertonic 3% saline to avoid hyponatremia. Maintain euvolemia. Keep -160 mmHg per neuro. Neuro-checks. Neuro team following; appreciate assistance.   Subarachnoid hemorrhage: discussion as above. Stable by repeat CT head 7/7. Neurology following. BP control. Continue lyrica. Avoid morphine.   HTN: maintaining SBP goal of 120-160 mmHg per neurology rec's. S/p cardene infusion. Currently on lisinopril, amlodipine PO. PRN hydralazine. Do not d/c amlodipine unless hypotensive.  Polyuria: continue hypertonic saline infusion. Appears euvolemic on exam. Elevated urine Osm and urine sodium. Appears to be c/w cerebral salt wasting. Trend sodium level q4h and avoid hyponatremia. 2L free water restriction. Sodium 144 today. Monitor I/O and make sure relatively even daily fluid intake and output. Continue albumin.   Leukocytosis: improving. no fever. No infectious s/s. Recent ruptured ACOM aneurysm s/p coiling. Recent systemic steroids exposure. Trend CBC.   History of polysubstance abuse: Patient is on home methadone therapy.  Anxiety, history of: home Wellbutrin  Severe aortic stenosis: ARMANDO 0.9-1.0 sq cm on recent echo. Will need cardiology evaluation once recovered from acute illness.   Constipation: continue 
  CRITICAL CARE PROGRESS NOTE      Patient:  Marika Peters    Unit/Bed:4D-06/006-A  YOB: 1975  MRN: [id number]   PCP: Katyh Buenrostro APRN - CNP  Date of Admission: 6/30/2024  Chief Complaint:- headache    Assessment and Plan:    Ruptured right ACOM aneurysm: Status post coiling by neurointerventional team on 6/30/2024.  Complicated by subarachnoid hemorrhage as below.  Patient continues to have on and off but regular headaches, which are reportedly unchanged in severity and unchanged from recent baseline postoperatively.  Undergoing as needed pain relieving medications. Monitor for any signs of vasospasm.  Continue Nimodipine for 21 days. On hypertonic 3% saline to avoid hyponatremia continue to decrease hypertonic rate. Continue IV NS to maintain euvolemia. Keep -160 mmHg per neuro. Neuro-checks. Discussed with neuro team who is following; appreciate recs  Subarachnoid hemorrhage: discussion as above. Stable by repeat CT head 7/7. Neurology following. BP control. Continue lyrica. Avoid morphine.   HTN: maintaining SBP goal of 120-160 mmHg per neurology rec's. S/p cardene infusion. Currently on lisinopril, amlodipine PO. PRN hydralazine. Do not d/c amlodipine unless hypotensive.  Polyuria: continue hypertonic saline infusion. Appears euvolemic on exam. Elevated urine Osm and urine sodium. Appears to be c/w cerebral salt wasting. Trend sodium level q4h and avoid hyponatremia. Sodium 143 today. Monitor I/O and make sure relatively even daily fluid intake and output. Continue NS infusion. Will adjust Prn for to maintain euvolemia.   Leukocytosis: resolved. no fever. No infectious s/s. Recent ruptured ACOM aneurysm s/p coiling. Recent systemic steroids exposure. Trend CBC.   History of polysubstance abuse: Patient is on home methadone therapy.  Anxiety, history of: home Wellbutrin  Severe aortic stenosis: ARMANDO 0.9-1.0 sq cm on recent echo. Will need cardiology evaluation once recovered from 
 [facility]  INPATIENT PHYSICAL THERAPY  DAILY NOTE  STRZ ICU 4D - 4D-06/006-A      Time In: 1350  Time Out: 1414  Timed Code Treatment Minutes: 24 Minutes  Minutes: 24          Date: 2024  Patient Name: Marika Peters,  Gender:  female        MRN: [id number]  : 1975  (49 y.o.)     Referring Practitioner: Denise Nye MD  Diagnosis: subarachnoid bleed  Additional Pertinent Hx: 49 y.o. female with past medical history of cervical cancer, substance abuse on methadone, hypertension who presents to [facility] on 2024 with complaints of nausea, vomiting, and severe headache.  Around 11 AM this morning she took her methadone and 400 mg of caffeine pills as she was feeling tired.  Shortly thereafter she began to develop a severe headache and vomit.  Patient's family reports that she also began to clench her hands and would not respond to them.  She apparently had refused her imaging multiple times.  At 1705 she agreed to do her CT of her head and this revealed moderately extensive subarachnoid hemorrhage.  She was stroke alerted at 1718.  CTA head and neck completed and revealed ruptured ACOM aneurysm.  [facility] alert activated at 1733.   emergent coiling of ruptured intracranial aneurysm with Dr. Nye.     Prior Level of Function:  Lives With: Family (dtrs and granddaughter)  Type of Home: House  Home Layout: Two level  Home Equipment: Cane   Bathroom Equipment: None    ADL Assistance: Independent  Homemaking Assistance: Independent  Ambulation Assistance: Independent  Transfer Assistance: Independent  Active : Yes  Additional Comments: highly ind PLOF    Restrictions/Precautions:  Restrictions/Precautions: Fall Risk, General Precautions  Position Activity Restriction  Other position/activity restrictions: low stim guidelines       SUBJECTIVE: pts call lt going off at arrival she was wanting her nurse to remove IV so she could take off her robe, pt voiced that 
 [facility]  INPATIENT PHYSICAL THERAPY  DAILY NOTE  [facility] ICU 4D - 4D-06/006-A      Time In: 1105  Time Out: 1114  Timed Code Treatment Minutes: 9 Minutes  Minutes: 9          Date: 7/10/2024  Patient Name: Marika Peters,  Gender:  female        MRN: [id number]  : 1975  (49 y.o.)     Referring Practitioner: Denise Nye MD  Diagnosis: subarachnoid bleed  Additional Pertinent Hx: 49 y.o. female with past medical history of cervical cancer, substance abuse on methadone, hypertension who presents to [facility] on 2024 with complaints of nausea, vomiting, and severe headache.  Around 11 AM this morning she took her methadone and 400 mg of caffeine pills as she was feeling tired.  Shortly thereafter she began to develop a severe headache and vomit.  Patient's family reports that she also began to clench her hands and would not respond to them.  She apparently had refused her imaging multiple times.  At 1705 she agreed to do her CT of her head and this revealed moderately extensive subarachnoid hemorrhage.  She was stroke alerted at 1718.  CTA head and neck completed and revealed ruptured ACOM aneurysm.  [facility] alert activated at 1733.   emergent coiling of ruptured intracranial aneurysm with Dr. Nye.     Prior Level of Function:  Lives With: Family (dtrs and granddaughter)  Type of Home: House  Home Layout: Two level  Home Equipment: Cane   Bathroom Equipment: None    ADL Assistance: Independent  Homemaking Assistance: Independent  Ambulation Assistance: Independent  Transfer Assistance: Independent  Active : Yes  Additional Comments: highly ind PLOF    Restrictions/Precautions:  Restrictions/Precautions: Fall Risk, General Precautions  Position Activity Restriction  Other position/activity restrictions: low stim guidelines       SUBJECTIVE: pt in bed on arrival she reported that she has been up and down to the bathroom all night and this am and declined any 
 [facility]  INPATIENT PHYSICAL THERAPY  DAILY NOTE  [facility] ICU 4D - 4D-06/006-A      Time In: 1426  Time Out: 1450  Timed Code Treatment Minutes: 24 Minutes  Minutes: 24          Date: 2024  Patient Name: Marika Peters,  Gender:  female        MRN: [id number]  : 1975  (49 y.o.)     Referring Practitioner: Denise Ney MD  Diagnosis: subarachnoid bleed  Additional Pertinent Hx: 49 y.o. female with past medical history of cervical cancer, substance abuse on methadone, hypertension who presents to [facility] on 2024 with complaints of nausea, vomiting, and severe headache.  Around 11 AM this morning she took her methadone and 400 mg of caffeine pills as she was feeling tired.  Shortly thereafter she began to develop a severe headache and vomit.  Patient's family reports that she also began to clench her hands and would not respond to them.  She apparently had refused her imaging multiple times.  At 1705 she agreed to do her CT of her head and this revealed moderately extensive subarachnoid hemorrhage.  She was stroke alerted at 1718.  CTA head and neck completed and revealed ruptured ACOM aneurysm.  [facility] alert activated at 1733.   emergent coiling of ruptured intracranial aneurysm with Dr. Nye.     Prior Level of Function:  Lives With: Family (dtrs and granddaughter)  Type of Home: House  Home Layout: Two level  Home Equipment: Cane   Bathroom Equipment: None    ADL Assistance: Independent  Homemaking Assistance: Independent  Ambulation Assistance: Independent  Transfer Assistance: Independent  Active : Yes  Additional Comments: highly ind PLOF    Restrictions/Precautions:  Restrictions/Precautions: Fall Risk, General Precautions  Position Activity Restriction  Other position/activity restrictions: low stim guidelines       SUBJECTIVE: nursing ok'd therapy, pt wanting to get upto bathroom and did agree to walk, pt upset about not getting lunch cues for 
 [facility]  INPATIENT PHYSICAL THERAPY  DAILY NOTE  [facility] ICU 4D - 4D-06/006-A    Time In: 1420  Time Out: 1443  Timed Code Treatment Minutes: 23 Minutes  Minutes: 23          Date: 7/3/2024  Patient Name: Marika Peters,  Gender:  female        MRN: [id number]  : 1975  (49 y.o.)     Referring Practitioner: Denise Nye MD  Diagnosis: subarachnoid bleed  Additional Pertinent Hx: 49 y.o. female with past medical history of cervical cancer, substance abuse on methadone, hypertension who presents to [facility] on 2024 with complaints of nausea, vomiting, and severe headache.  Around 11 AM this morning she took her methadone and 400 mg of caffeine pills as she was feeling tired.  Shortly thereafter she began to develop a severe headache and vomit.  Patient's family reports that she also began to clench her hands and would not respond to them.  She apparently had refused her imaging multiple times.  At 1705 she agreed to do her CT of her head and this revealed moderately extensive subarachnoid hemorrhage.  She was stroke alerted at 1718.  CTA head and neck completed and revealed ruptured ACOM aneurysm.  [facility] alert activated at 1733.   emergent coiling of ruptured intracranial aneurysm with Dr. Nye.     Prior Level of Function:  Lives With: Family (dtrs and granddaughter)  Type of Home: House  Home Layout: Two level        ADL Assistance: Independent  Homemaking Assistance: Independent  Ambulation Assistance: Independent  Transfer Assistance: Independent  Active : Yes  Additional Comments: highly ind PLOF    Restrictions/Precautions:  Restrictions/Precautions: Fall Risk, General Precautions     SUBJECTIVE: RN approved session. Pt pleasant and agreeable to therapy.     PAIN: does not c/o pain initially; later does c/o headache - doesn't rate     Vitals:  WFL throughout     OBJECTIVE:  Bed Mobility:  Supine to Sit: Stand By Assistance, with head of bed 
1824 Verify consent, H&P, and/or immediate pre-procedure note completed.  1824 Staff involved in the case JYOTI Levy RT, Charmaine Yao RN, LALITHA Benítez RT, MILE Lucio RN, Dr Nye, CRNA, Dr Pantoja.  1824 Patient received in cath lab for procedure family taken to waiting room. Dr Pantoja to get consent for anesthesia. Neuro assessment pt alert but slow to respond. Hand grasp and pedal push and pull equal and strong.  1824 Neuro checks suspended while in procedure, vital signs, and pulse oximeter and medications per anesthesia and  documented in flow sheets.  1830 Pt moved to table and attached to monitor.  1830 Pre-procedure peripheral pulse,  right pedal 2+, right posterior, tibial 2+, left pedal 2+, left posterior tibial 2+. Feet warm bilat with good color.  1835 Pt intubated per anesthesia and bilateral groins prepped and draped. Patient prepped for procedure.  1846 Procedure started with Dr. Nye. Timeout performed and the following information was verified: correct patient, correct procedure, correct procedure site, correct position, correct laterality, procedure site marked and visible, and consents verified.  Allergies reviewed.  Pertinent diagnostic and radiologic results present and relevant images available.  All special equipment or special requirements available as applicable.  Fire risk safety assessment completed, shared with team and appropriate interventions implemented.  1848 Access obtained at right femoral with 6 fr sheath x 1 attempt.  1853 Angiogram of SEGUN with Benchmark catheter.  1855 Verapamil 3 mg in right ICA per Dr Nye.  1857 Dr Nye reviewing images.  1907 3 mm X 6 cm Target 360 SOFT coil deployed in right ACOM.  1910 Attempting second coil.  1917 Second coil not deployed. Removed from body.  1917 Anatomical location of coils-right ACOM.  1917 Number of coils-1.  1918 Post images taken.  1920 Angiogram of right femoral artery. Prepping for Angio-seal.  1921 Sheath-removed, 
[facility]  PHYSICAL THERAPY MISSED TREATMENT NOTE  STRZ ICU 4D    Date: 2024  Patient Name: Marika Peters        MRN: [id number]   : 1975  (49 y.o.)  Gender: female   Referring Practitioner: Denise Nye MD  Diagnosis: subarachnoid bleed         REASON FOR MISSED TREATMENT:  Nursing ok'd therapy if pt would agree, she reported that she has had a 10/10 HA that pain meds aren't helping. Pt sleeping on arrival and she did briefly open her eyes but quickly closed them. Pt would answer me when I would ask questions but was mumbling her speech. She declined any activity even to get repositioned in bed due to HA. Will check back next available date  .            
[facility]  SPEECH THERAPY  STRZ ICU 4D  Clinical Swallow Evaluation      SLP Individual Minutes  Time In: 0850  Time Out: 0900  Minutes: 10  Timed Code Treatment Minutes: 0 Minutes       DIET ORDER RECOMMENDATIONS AFTER EVALUATION: Regular, thin liquids    Date: 2024  Patient Name: Marika Peters      CSN: [id number]   : 1975  (49 y.o.)  Gender: female   Referring Physician: Denise Nye MD    Diagnosis: Subarachnoid bleed (HCC)    History of Present Illness/Injury: Patient admitted to [facility] with above med dx; please refer to physician H&P for full details. Per chart review, \"49-year-old female with a history of substance abuse presented to the ED yesterday with a chief complaint of severe headache and emesis, she took methadone yesterday morning around 1030 before coming in to the ED.  Initially, she refused CT but eventually agreed, revealing subarachnoid hemorrhage.  Neurology was consulted and patient was was given TXA and Keppra.  She successfully underwent coiling by neurology yesterday.  Nursing reports patient was complaining of headache and back pain overnight and requesting some sort of as needed pain control.  Patient was lying in bed this morning and minimally responsive to questions and commands.\"    CTA head 2024  IMPRESSION:   1. 3.3 x 3 mm aneurysm arising from the anterior communicating artery. There is  subarachnoid hemorrhage seen on the noncontrast CT scan of the brain.  2. Otherwise negative CTA of the head and neck.  3. 1.3 x 1.2 cm hypodensity in the right lobe of the thyroid gland.     No past medical history on file.    SUBJECTIVE:  GARY Murdock with approval to proceed with evaluation. Upon arrival, patient resting in bed in left lateral recumbent placement s/t persistent back pain. C/o headache significant with nursing aware and addressing concerns; pain/discomfort levels serving as a barrier. Baseline diet of regular solids/thin liquids 
[facility]  SPEECH THERAPY MISSED TREATMENT NOTE  STRZ ICU 4D      Date: 2024  Patient Name: Marika Peters        MRN: [id number]    : 1975  (49 y.o.)    REASON FOR MISSED TREATMENT:    GARY Toledo approved ST session. Patient resting in bed with eyes closed upon ST arrival. Easily roused to call of name; however, unable to maintain alertness. Initiated ACE concussion scale; however, patient with poor participation s/t closing eyes and absent response to questioning. Session discontinued. Will re-attempt on this date as schedule permits.   GARY Toledo made aware of patient state.     Karly Farooq MA, CCC-SLP [id number]          
[facility]  INPATIENT SPEECH THERAPY  STRZ ICU 4D  DAILY NOTE    TIME   SLP Individual Minutes  Time In: 946  Time Out: 1024  Minutes: 38  Timed Code Treatment Minutes: 38 Minutes  Cognitive therapy: 38 minutes       Date: 2024  Patient Name: Marika Peters      CSN: [id number]   : 1975  (49 y.o.)  Gender: female   Referring Physician:   Denise Nye MD   Diagnosis: SAH  Precautions: Fall risk, low stimulation guidelines/protocol  Current Diet: Regular, thin liquids  Respiratory Status: Room Air  Swallowing Strategies: Standard Universal Swallow Precautions  Date of Last MBS/FEES: Not Applicable    Pain:  No pain reported.    Subjective:  RN Bessie with approval to proceed with interventions. Upon arrival, sitting upright in bed. Alert and cooperative throughout therapy session. Reports she is feeling well and wishes to go home.     Short-Term Goals:  SHORT TERM GOAL #1:  GOAL MET, NO NEW GOAL    SHORT TERM GOAL #2:  Goal 2: Patient and family will exhibit return demonstration for employment of a low stimulation environment (ACE completion) to optimize neurological healing s/t pertinence for intracranial events.  INTERVENTIONS:   Beginning of Therapy Session  Acute Concussion Evaluation (ACE):   Physical Symptoms: 1/10  Cognitive Symptoms: 0/4  Emotional Symptoms: 0/4  Sleep Symptoms: /  Total:     End of Therapy Session:  Acute Concussion Evaluation (ACE):   Physical Symptoms: 2/10  Cognitive Symptoms: 0/4  Emotional Symptoms: 0/4  Sleep Symptoms: /  Total: 3/22    Patient with ongoing adherence for employment of a low stimulation environment. Despite no evidence for over-stimulation at date, concerns for potential onset of symptoms within a challenging and multi-tasking environment. Necessity to maintain low stimulation guidelines at this time with patient aware and receptive. Patient reports slight increase in headache at the completion of interventions.     SHORT TERM 
[facility]  OCCUPATIONAL THERAPY MISSED TREATMENT NOTE  STRZ ICU 4D  4D-06/006-A      Date: 2024  Patient Name: Marika Peters        CSN: [id number]   : 1975  (49 y.o.)  Gender: female      Diagnosis: Subarachnoid Hemorrhage         REASON FOR MISSED TREATMENT:  On first attempt in AM pt refused due to not feeling well and HA. On 2nd attempt in AM pt was leaving floor to go to CT. Will check back as time allows.                
Call received from Dr. Nye. Dr. Nye notified of patient's 10/10 headache and nausea. Defer pain management to ICU team per Dr. Nye. 250mg solumedrol telephone with read back order received from Dr. Nye.   
Comprehensive Nutrition Assessment    Type and Reason for Visit:  Initial, RD Nutrition Re-Screen/LOS    Nutrition Recommendations/Plan:   Continue diet per Physician  Begin Ensure Clear ONS TID  Continue bowel regimen - no recorded BM x8d     Malnutrition Assessment:  Malnutrition Status:  Insufficient data (07/08/24 1520)    Context:  Acute Illness     Findings of the 6 clinical characteristics of malnutrition:  Energy Intake:  50% or less of estimated energy requirements for 5 or more days  Weight Loss:  Unable to assess (only stated weight on admit and no further weights since admit d/t bedscale not working)     Body Fat Loss:  Unable to assess (pt. wrapped in blankets and c/o HA 10/10; nausea - will assess next RD FU)     Muscle Mass Loss:  Unable to assess    Fluid Accumulation:  No significant fluid accumulation     Strength:  Not Performed    Nutrition Assessment:     Pt. nutritionally compromised AEB poor po intake since admit d/t nausea per pt. Report.  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      Nutrition Related Findings:    Pt. Report/Treatments/Miscellaneous: pt. Seen; wrapped in blankets and c/o HA and nausea; encouraged pt. To try CL ONS; discussed with RN and SLP - ? If pt. May need NGT d/t poor alertness and poor po intake past 8d; UO 1300ml; emesis x1 this am; has had zofran 1x daily for past 4d; when pt. Is awake she has done well with regular texture per SLP although this am RN felt she was \"choky\" on a large pill; monitoring for vasospasms on ICU; monitoring for CSWS  GI Status: BM 0 (8d)  Pertinent Labs: glucose 116  BUN 7  creatinine 0.5  Na 141  K+ 3.3  Pertinent Meds: colace, milk of mag, movantik, zofran, 3% saline, methadone    Wound Type: None       Current Nutrition Intake & Therapies:  
Comprehensive Nutrition Assessment    Type and Reason for Visit:  Reassess (PO Monitor)    Nutrition Recommendations/Plan:   Consider a Multivitamin daily.  Continue current diet.  Decreased Ensure Clear to daily.     Malnutrition Assessment:  Malnutrition Status:  At risk for malnutrition (Comment) (07/11/24 6187)    Context:  Acute Illness     Findings of the 6 clinical characteristics of malnutrition:  Energy Intake:  50% or less of estimated energy requirements for 5 or more days  Weight Loss:  Unable to assess (only stated weight on admit and no further weights since admit d/t bedscale not working)     Body Fat Loss:  No significant body fat loss     Muscle Mass Loss:  No significant muscle mass loss    Fluid Accumulation:  No significant fluid accumulation     Strength:  Not Performed    Nutrition Assessment: Pt improving from a nutritional standpoint AEB pt report of improved appetite consuming % of meals today. 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 Report/Treatments/Miscellaneous: Pt seen earlier today-pt reports she feels better today consuming all of her breakfast and ~75% of her lunch. Pt reports only drinking one Ensure daily and would like it decrease to one daily. Pt denies any N/V today. 100 ml emesis in 24 hours. UO 6600 ml in 24 hours.  GI Status: Last BM x1 on 7/10.  Pertinent Labs: Sodium 141, Potassium 3.5, BUN 8, Cr. 0.7, Glucose 102  Pertinent Meds: Colace, Pepcid, Movantik, Zofran, Senokot  Wound Type: None       Current Nutrition Intake & Therapies:    Average Meal Intake: %, 51-75%  Average Supplements Intake: 26-50% (pt reprots consuming one Ensure Clear daily)  ADULT DIET; Regular  ADULT ORAL NUTRITION SUPPLEMENT; Breakfast; Clear 
Follow up visit for prayer and encouragement    07/11/24 1446   Encounter Summary   Service Provided For Patient   Referral/Consult From [facility]   Support System Children   Last Encounter  07/11/24   Complexity of Encounter Low   Begin Time 1040   End Time  1045   Total Time Calculated 5 min   Spiritual/Emotional needs   Type Spiritual Support   Assessment/Intervention/Outcome   Assessment Hopeful   Intervention Active listening   Outcome Encouraged       
[facility]  SPEECH THERAPY MISSED TREATMENT NOTE  STRZ ICU 4D      Date: 2024  Patient Name: Marika Peters        MRN: [id number]    : 1975  (49 y.o.)    REASON FOR MISSED TREATMENT:  RN approved ST session; however, reports patient c/o 10/10 headache. RN also reports \"choking\" with large pill. Upon arrival, patient briefly opened eyes to call of name; however, unable to maintain alertness. Not appropriate for ST interventions at this time. Will f/u on subsequent date.     Karly Farooq MA, CCC-SLP [id number]            
[facility]  OCCUPATIONAL THERAPY MISSED TREATMENT NOTE  STRZ ICU 4D  4D-06/006-A      Date: 2024  Patient Name: Marika Peters        CSN: [id number]   : 1975  (49 y.o.)  Gender: female                REASON FOR MISSED TREATMENT:  Per RN, Pt reports headache too significant to participate.Will check back .                 
[facility]  OCCUPATIONAL THERAPY MISSED TREATMENT NOTE  STRZ ICU 4D  4D-06/006-A      Date: 7/3/2024  Patient Name: Marika Peters        CSN: [id number]   : 1975  (49 y.o.)  Gender: female      Diagnosis: Subarachnoid Hemorrhage         REASON FOR MISSED TREATMENT: Patient Unavailable  IV team getting ready to place PICC on OT arrival. Will check back as time allows.                
[facility] [place][note 2]  SPEECH THERAPY MISSED TREATMENT NOTE  STRZ ICU 4D      Date: 2024  Patient Name: Marika Peters        MRN: [id number]    : 1975  (49 y.o.)    REASON FOR MISSED TREATMENT:  Attempted to see patient x2; initial attempt with needs to defer from nursing s/t heightened headache levels, second attempt patient completing PT evaluation. Will f/u on subsequent date.       Kelsy De Dios M.A., CCC-SLP [id number]          
[facility] [place][note 2]  SPEECH THERAPY MISSED TREATMENT NOTE  STRZ ICU 4D      Date: 2024  Patient Name: Marika Peters        MRN: [id number]    : 1975  (49 y.o.)    REASON FOR MISSED TREATMENT:  ST attempts to follow up with cognitive rehabilitation following approval from RNStevie. Patient resting in bed and agreeable to intervention; however, poor ability to maintain adequate alertness levels. Will check back early next week as able/clinically appropriate.     Jane Caldwell M.S. CCC-SLP [id number] 2024    
[facility][note 3]  INPATIENT SPEECH THERAPY  STRZ ICU 4D  DAILY NOTE    TIME   SLP Individual Minutes  Time In: 1434  Time Out: 1454  Minutes: 20  Timed Code Treatment Minutes: 12 Minutes  Dysphagia therapy: 8 minutes  Cognitive therapy: 12 minutes       Date: 7/10/2024  Patient Name: Marika Peters      CSN: [id number]   : 1975  (49 y.o.)  Gender: female   Referring Physician:   Denise Nye MD   Diagnosis: SAH  Precautions: Fall risk, low stimulation guidelines/protocol  Current Diet: Regular, thin liquids  Respiratory Status: Room Air  Swallowing Strategies: Standard Universal Swallow Precautions  Date of Last MBS/FEES: Not Applicable    Pain:  No pain reported.    Subjective:  RN Tre with approval to proceed with interventions. Upon arrival, patient resting in bed with spontaneous wakefulness and positive awareness to immediate context. Improved meaningful engagement within therapy session. Absence for family at bedside.     Short-Term Goals:  SHORT TERM GOAL #1:  Goal 1: Patient will consume baseline diet of regular solids/thin liquids with stable pulmonary status and with incorporation of trained compensatory strategies to assist with nutrition/hydration measures. GOAL MET, NO NEW GOAL  INTERVENTIONS: Patient reporting significant gains in appetite measures with no concerns r/t swallow function. Dietary analysis subsequent facilitated to determine necessity for further management. Oral phase relatively unremarkable within coarse offerings, absence for presence of residuals to equate to intact cohesive bolus formation. Thin carbonated fluid via bottle with no overt s/s aspiration. Certainly not able to r/o totality of airway invasion events and/or pharyngeal dysfunction at bedside alone; patient's swallow physiology appears functional to support goal for comfortable oral intake without distress.    Outlined s/s aspiration:  -Immediate/delayed cough  -Throat clear  -Wet vocal quality 
Neurology Progress Note    Date:7/10/2024       Room:[facility]06/006-A  Patient Name:Marika Peters     YOB: 1975     Age:49 y.o.    Chief Complaint:   Chief Complaint   Patient presents with    Emesis    Headache       Subjective     Marika Peters is a 49 y.o. female with past medical history of cervical cancer, substance abuse on methadone, hypertension who presents to [facility] on 6/30/2024 with complaints of nausea, vomiting, and severe headache.  Around 11 AM this morning she took her methadone and 400 mg of caffeine pills as she was feeling tired.  Shortly thereafter she began to develop a severe headache and vomit.  Patient's family reports that she also began to clench her hands and would not respond to them.  She apparently had refused her imaging multiple times.  At 1705 she agreed to do her CT of her head and this revealed moderately extensive subarachnoid hemorrhage.  She was stroke alerted at 1718.  CTA head and neck completed and revealed ruptured ACOM aneurysm.  [facility] alert activated at 1733.  Procedure explained with patient and her daughter.  Risks(kidney toxicity, dissection, infection, hemorrhage, hematoma, allergic reaction, death, stroke) and benefits explained to patient and her daughter and they deny any further questions.  Her daughter Viola Forbes signed consent and it was placed in her heart rate.  She did not know of any history of aneurysms.  She does have an aunt that about 1 year ago underwent coiling for a ruptured aneurysm.  Patient is a current cigarette smoker and smokes about 1 pack/day.    Interval History 7/1/2024:  No acute events overnight. Patient rating headache 5/10 this morning on examination. Recommend against use of Morphine. We will start patient on a Medrol dose pack today to help with the headache. Okay for patient to start subcutaneous heparin. Patient should begin home methadone.    Interval History 7/2/2024:  No acute events 
Neurology Progress Note    Date:7/2/2024       Room:[facility]06/006-A  Patient Name:Marika Peters     YOB: 1975     Age:49 y.o.    Chief Complaint:   Chief Complaint   Patient presents with    Emesis    Headache       Subjective     Marika Peters is a 49 y.o. female with past medical history of cervical cancer, substance abuse on methadone, hypertension who presents to [facility] on 6/30/2024 with complaints of nausea, vomiting, and severe headache.  Around 11 AM this morning she took her methadone and 400 mg of caffeine pills as she was feeling tired.  Shortly thereafter she began to develop a severe headache and vomit.  Patient's family reports that she also began to clench her hands and would not respond to them.  She apparently had refused her imaging multiple times.  At 1705 she agreed to do her CT of her head and this revealed moderately extensive subarachnoid hemorrhage.  She was stroke alerted at 1718.  CTA head and neck completed and revealed ruptured ACOM aneurysm.  [facility] alert activated at 1733.  Procedure explained with patient and her daughter.  Risks(kidney toxicity, dissection, infection, hemorrhage, hematoma, allergic reaction, death, stroke) and benefits explained to patient and her daughter and they deny any further questions.  Her daughter Viola Forbes signed consent and it was placed in her heart rate.  She did not know of any history of aneurysms.  She does have an aunt that about 1 year ago underwent coiling for a ruptured aneurysm.  Patient is a current cigarette smoker and smokes about 1 pack/day.    Interval History 7/1/2024:  No acute events overnight. Patient rating headache 5/10 this morning on examination. Recommend against use of Morphine. We will start patient on a Medrol dose pack today to help with the headache. Okay for patient to start subcutaneous heparin. Patient should begin home methadone.    Interval History 7/2/2024:  No acute events 
Neurology Progress Note    Date:7/3/2024       Room:[facility]06/006-A  Patient Name:Marika Peters     YOB: 1975     Age:49 y.o.    Chief Complaint:   Chief Complaint   Patient presents with    Emesis    Headache       Subjective     Marika Peters is a 49 y.o. female with past medical history of cervical cancer, substance abuse on methadone, hypertension who presents to [facility] on 6/30/2024 with complaints of nausea, vomiting, and severe headache.  Around 11 AM this morning she took her methadone and 400 mg of caffeine pills as she was feeling tired.  Shortly thereafter she began to develop a severe headache and vomit.  Patient's family reports that she also began to clench her hands and would not respond to them.  She apparently had refused her imaging multiple times.  At 1705 she agreed to do her CT of her head and this revealed moderately extensive subarachnoid hemorrhage.  She was stroke alerted at 1718.  CTA head and neck completed and revealed ruptured ACOM aneurysm.  [facility] alert activated at 1733.  Procedure explained with patient and her daughter.  Risks(kidney toxicity, dissection, infection, hemorrhage, hematoma, allergic reaction, death, stroke) and benefits explained to patient and her daughter and they deny any further questions.  Her daughter Viola Forbes signed consent and it was placed in her heart rate.  She did not know of any history of aneurysms.  She does have an aunt that about 1 year ago underwent coiling for a ruptured aneurysm.  Patient is a current cigarette smoker and smokes about 1 pack/day.    Interval History 7/1/2024:  No acute events overnight. Patient rating headache 5/10 this morning on examination. Recommend against use of Morphine. We will start patient on a Medrol dose pack today to help with the headache. Okay for patient to start subcutaneous heparin. Patient should begin home methadone.    Interval History 7/2/2024:  No acute events 
Neurology Progress Note    Date:7/8/2024       Room:[facility]06/006-A  Patient Name:Marika Peters     YOB: 1975     Age:49 y.o.    Chief Complaint:   Chief Complaint   Patient presents with    Emesis    Headache       Subjective     Marika Peters is a 49 y.o. female with past medical history of cervical cancer, substance abuse on methadone, hypertension who presents to [facility] on 6/30/2024 with complaints of nausea, vomiting, and severe headache.  Around 11 AM this morning she took her methadone and 400 mg of caffeine pills as she was feeling tired.  Shortly thereafter she began to develop a severe headache and vomit.  Patient's family reports that she also began to clench her hands and would not respond to them.  She apparently had refused her imaging multiple times.  At 1705 she agreed to do her CT of her head and this revealed moderately extensive subarachnoid hemorrhage.  She was stroke alerted at 1718.  CTA head and neck completed and revealed ruptured ACOM aneurysm.  [facility] alert activated at 1733.  Procedure explained with patient and her daughter.  Risks(kidney toxicity, dissection, infection, hemorrhage, hematoma, allergic reaction, death, stroke) and benefits explained to patient and her daughter and they deny any further questions.  Her daughter Viola Forbes signed consent and it was placed in her heart rate.  She did not know of any history of aneurysms.  She does have an aunt that about 1 year ago underwent coiling for a ruptured aneurysm.  Patient is a current cigarette smoker and smokes about 1 pack/day.    Interval History 7/1/2024:  No acute events overnight. Patient rating headache 5/10 this morning on examination. Recommend against use of Morphine. We will start patient on a Medrol dose pack today to help with the headache. Okay for patient to start subcutaneous heparin. Patient should begin home methadone.    Interval History 7/2/2024:  No acute events 
Notified Dr. Crane of patient vomiting again after giving 2000 medications, no relief after giving IV morphine, and increased blood pressure despite giving IV hydralazine. New order received.   
[facility]  OCCUPATIONAL THERAPY MISSED TREATMENT NOTE  STRZ ICU 4D  4D-06006-A      Date: 2024  Patient Name: Marika Peters        CSN: [id number]   : 1975  (49 y.o.)  Gender: female      Diagnosis: Subarachnoid Hemorrhage         REASON FOR MISSED TREATMENT: Patient Refused.  Pt refused any activity with therapy this AM due to intense HA. Night nurse reports she attempted to offer pt to get washed up multiple times throughout the night and pt refused each time.                 
PICC Procedure Note    Marika Peters   Admitted- 6/30/2024 12:06 PM  Admission diagnosis- SAH (subarachnoid hemorrhage) (HCC) [I60.9]  Subarachnoid bleed (HCC) [I60.9]      Attending Physician- Josef Oakley MD  Ordering Physician-Dakota Rivers III, DO   Indication for Insertion: Poor Vascular Access    Catheter Insertion Date- 7/3/2024   Lot Number- [id number]  Gauge-5  Lumen-triple    Insertion Site- NICOLE Basilic  Vein Circumference- 1.98 cm  Catheter Length- 36 cm  Internal Length- 36 cm  Exposed Catheter Length- 0cm   Upper Arm Circumference- 35cm  Tip Confirmation System Bundle met- Yes  If X-ray required, Tip Location- N/A  Radiologist- N/A    PICC insertion successful- Yes  Ultrasound- yes    Okay To Use PICC- Yes    Electronically signed by Basilia Cook RN on 7/3/2024 at 1:13 PM    
Patient discharged. PICC line removed.   Patient unable to  Rx at [facility] due to it being Sunday.   Dr. Crane sending Rx over to the [facility] on [address] per patient request.   
[facility]  INPATIENT PHYSICAL THERAPY  EVALUATION  STR ICU 4D - 4D-06/006-A    Time In: 1410  Time Out: 1435  Timed Code Treatment Minutes: 15 Minutes  Minutes: 25          Date: 2024  Patient Name: Marika Peters,  Gender:  female        MRN: [id number]  : 1975  (49 y.o.)      Referring Practitioner: Denise Nye MD  Diagnosis: subarachnoid bleed  Additional Pertinent Hx: 49 y.o. female with past medical history of cervical cancer, substance abuse on methadone, hypertension who presents to [facility] on 2024 with complaints of nausea, vomiting, and severe headache.  Around 11 AM this morning she took her methadone and 400 mg of caffeine pills as she was feeling tired.  Shortly thereafter she began to develop a severe headache and vomit.  Patient's family reports that she also began to clench her hands and would not respond to them.  She apparently had refused her imaging multiple times.  At 1705 she agreed to do her CT of her head and this revealed moderately extensive subarachnoid hemorrhage.  She was stroke alerted at 1718.  CTA head and neck completed and revealed ruptured ACOM aneurysm.  [facility] alert activated at 1733.   emergent coiling of ruptured intracranial aneurysm with Dr. Nye.     Restrictions/Precautions:  Restrictions/Precautions: Fall Risk, General Precautions    Subjective:  Chart Reviewed: Yes  Patient assessed for rehabilitation services?: Yes  Subjective: RN approved session. Pt pleasant and agreeable to therapy    General:  Overall Orientation Status: Within Functional Limits  Vision: Within Functional Limits  Hearing: Within functional limits       Pain: 7/10: headache; improving     Vitals:  vitals WFL     Social/Functional History:    Lives With: Family (dtrs and granddaughter)  Type of Home: House  Home Layout: Two level             ADL Assistance: Independent  Homemaking Assistance: Independent  Ambulation Assistance: 
Pt was in bed and was looking so weak. She was encouraged and blessed as she was dealing with subarachnoid hemorrhage.    07/02/24 1138   Encounter Summary   Encounter Overview/Reason Initial Encounter   Service Provided For Patient   Referral/Consult From Rounding   Support System Family members   Last Encounter  07/02/24   Complexity of Encounter Low   Begin Time 0910   End Time  0915   Total Time Calculated 5 min   Spiritual/Emotional needs   Type Spiritual Support   Assessment/Intervention/Outcome   Assessment Anxious   Intervention Empowerment       
Pt was in bed as her nurse was with her. She was dealing with subarachnoid hemorrhage. She was encouraged   07/09/24 1401   Encounter Summary   Service Provided For Patient   [facility]   Support System Children   Last Encounter  07/09/24   Complexity of Encounter Low   Begin Time 0925   End Time  0930   Total Time Calculated 5 min   Spiritual/Emotional needs   Type Spiritual Support   Assessment/Intervention/Outcome   Assessment Hopeful   Intervention Empowerment      and blessed.   
[facility]  INPATIENT OCCUPATIONAL THERAPY  [facility] ICU 4D  EVALUATION    Time:   Time In: 1340  Time Out: 1405  Timed Code Treatment Minutes: 18 Minutes  Minutes: 25          Date: 2024  Patient Name: Marika Peters,   Gender: female      MRN: [id number]  : 1975  (49 y.o.)  Referring Practitioner: Denise Nye MD  Diagnosis: Subarachnoid Hemorrhage  Additional Pertinent Hx: Per EMR: \"Patient is a 49-year-old female with a history of substance abuse presented to the ED yesterday with a chief complaint of severe headache and emesis, she took methadone yesterday morning around 1030 before coming in to the ED.  Initially, she refused CT but eventually agreed, revealing subarachnoid hemorrhage.  Neurology was consulted and patient was was given TXA and Keppra.  CT H (+) SAH in both sylvian fissures as well as in the midline superiorly -> CTA head and neck completed and revealed ruptured ACOM aneurysm -> [facility] alert called. Pt s/p aneurysm coiling on  with subsequent SAH.    Restrictions/Precautions:  Restrictions/Precautions: Fall Risk, General Precautions  Position Activity Restriction  Other position/activity restrictions: low stim guidelines    Subjective  Chart Reviewed: Yes, Orders, Progress Notes, History and Physical  Patient assessed for rehabilitation services?: Yes  Family / Caregiver Present: No    Subjective: Pt supine in bed upon arrival, agreeable to OT session and motivated to participate.    Pain: 0/10:     Vitals: Blood Pressure: 114/60  Heart Rate: 72  All vitals remained stable throughout    **pt currently on 3% hypertonic saline, RN paused during session as sodium levels WFL.    Social/Functional History:  Lives With: Family (dtrs and granddaughter)  Type of Home: House  Home Layout: Two level  Home Equipment: Cane   Bathroom Equipment: None       ADL Assistance: Independent  Homemaking Assistance: Independent  Ambulation Assistance: Independent  Transfer Assistance: 
Stroke Folder given.   What is Stroke/CVA  Signs and Symptoms of stroke ([facility])  Treatments for Stroke  Personal Risk Factors for Stroke discussed  Education--Call 911      Patient/family has been educated on their personal risk factors of:  Hypertension  History of polysubstance abuse  smoking cessation  Family history of aneurysm      They have been given hand outs on the following medications:(give handouts/attach to AVS)   asa  Heparin  nimotop    Treatment for stroke includes:  Risk factor modifications  Following the medication regime prescribed by physician      Educated on FAST-Face-Arm-Speech-Time    A stroke is a brain attack.Stroke is a brain injury. It occurs when the brain's blood supply is interrupted. Blood carries oxygen and nutrients to the brain. Without oxygen and nutrients from blood, brain tissue starts to die rapidly. This can happen in less than 10 minutes.    A stroke occurs when blood flow to the brain is blocked (called ischemic stroke). This is caused by one of the following:   Sudden decreased blood flow   Damage to a blood vessel supplying blood to the brain can occur suddenly from either:   Injury   A clot that forms and breaks off from another part of the body (such as the heart or neck)   There are certain conditions which predispose people to form blood clots, such as:   Cancer   Pregnancy   Atrial fibrillation   Certain autoimmune diseases   Local blood clot   A build-up of fatty substances ( atherosclerotic plaque ) along the inner lining of the artery causes:   Narrowing of artery   Reduced elasticity   Local inflammation   Blood protein defects leading to increased clotting tendency   Decreased blood flow in the artery   Clot in an artery supplying the brain   Inflammatory conditions in the blood vessels (vasculitis)   A stroke may also occur if a blood vessel breaks and bleeds into or around the brain. This is called hemorrhagic stroke.      This condition needs to be monitored 
This RN attempted [facility] [place] [facility], and [facility] in [place] for medical records on pt. All three hospitals deny that pt has been admitted there.   
This RN spoke with patient's daughter Viola regarding patient's status and plan of care. All questions answered at this time. Viola encouraged to call unit with any other questions or concerns.     
This RN went over medication list with daughter. Daughter unsure if these were all the medications she takes as it was a list from April of 2024. Daughter also states patients has an appointment with Kathy Buenrostro CNP with [facility] [address] this month. This RN reached out to office for a med list or any history. No answer at this time.   
Updated Dr. Crane that patient almost immediately threw up after giving midnight dose of nimodipine just like the 2000 dose. Informed him that patient was able to tolerate normal medications that were given at 2200. New order received to make patient NPO.   
Updated Dr. Crane that patient had lopressor at midnight and that it is not available again until 0600, will be giving patient pain medication, but patient is still having elevated blood pressures. Order received to start cardene if patient is still having elevated blood pressures above 160 after pain medication administration.   
[facility]  SPEECH THERAPY  [facility] ICU 4D  Speech - Language - Cognitive Evaluation + Cognitive Therapy    SLP Individual Minutes  Time In: 1031  Time Out: 1053  Minutes: 22  Timed Code Treatment Minutes: 9 Minutes  Speech, language, cognitive evaluation: 12 minutes  Cognitive therapy: 9 minutes       Date: 7/3/2024  Patient Name: Marika Peters      CSN: [id number]   : 1975  (49 y.o.)  Gender: female   Referring Physician: Denise Nye MD     Diagnosis: SAH  Precautions: Fall risk, aspiration precautions, low stimulation guidelines/protocol   History of Present Illness/Injury: Patient admitted to [facility] with above med dx; please refer to physician H&P for full details. Per chart review, \"49-year-old female with a history of substance abuse presented to the ED yesterday with a chief complaint of severe headache and emesis, she took methadone yesterday morning around 1030 before coming in to the ED.  Initially, she refused CT but eventually agreed, revealing subarachnoid hemorrhage.  Neurology was consulted and patient was was given TXA and Keppra.  She successfully underwent coiling by neurology yesterday.  Nursing reports patient was complaining of headache and back pain overnight and requesting some sort of as needed pain control.  Patient was lying in bed this morning and minimally responsive to questions and commands.\"     CTA head 2024  IMPRESSION:   1. 3.3 x 3 mm aneurysm arising from the anterior communicating artery. There is  subarachnoid hemorrhage seen on the noncontrast CT scan of the brain.  2. Otherwise negative CTA of the head and neck.  3. 1.3 x 1.2 cm hypodensity in the right lobe of the thyroid gland.  No past medical history on file.    Pain: 7/10 - Pain location: headache, nursing aware    Subjective:  RN Génesis with approval to proceed with evaluation. Upon arrival, patient resting in bed with spontaneous wakefulness and improved alertness in comparison 
[facility]  OCCUPATIONAL THERAPY MISSED TREATMENT NOTE  [facility] ICU 4D  [id number]      Date: 2024  Patient Name: Marika Peters        CSN: [id number]   : 1975  (49 y.o.)  Gender: female   Referring Practitioner: Denise Nye MD  Diagnosis: Subarachnoid Hemorrhage         REASON FOR MISSED TREATMENT:  Patient up in room independently with no device completing sponge bathing standing at sink, per nursing patient has been walking halls independently as well. Patient likely to be discharged over the weekend as long as decrease in sodium is tolerated. Decrease in patient frequency as patient tolerating well and engaging in daily activities on a daily basis                
COURSE:  Patient was seen this morning at the bedside where she reports new onset stomach pain that she believes is precipitated by her not getting her methadone.  She is concerned that she is going to go into withdrawals within the next few days if she does not get her home methadone.  Nursing reports no overnight events but that the patient was complaining of headache.    Past Medical History: Unavailable.  Family History: Unavailable.  Social History: Multisubstance use.    ROS   Patient reports abdominal tenderness to palpation and headache.  Patient does not report chest pain shortness of breath.    Scheduled Meds:   aspirin  81 mg Oral Daily    niMODipine  60 mg Oral 6 times per day    docusate sodium  100 mg Oral Daily    senna  1 tablet Oral Nightly    methylPREDNISolone  4 mg Oral QAM AC    methylPREDNISolone  4 mg Oral Lunch    methylPREDNISolone  4 mg Oral Dinner    methylPREDNISolone  8 mg Oral Nightly    [START ON 7/3/2024] methylPREDNISolone  4 mg Oral Nightly    pregabalin  50 mg Oral TID    heparin (porcine)  5,000 Units SubCUTAneous 3 times per day    sodium chloride flush  5-40 mL IntraVENous 2 times per day    sodium chloride flush  5-40 mL IntraVENous 2 times per day     Continuous Infusions:   niCARdipine 5 mg/hr (07/01/24 2044)    sodium chloride         PHYSICAL EXAMINATION:  T: 98.8.  P: 62. RR: 20. B/P: 107/54. O2 Sat: 100% via room air.  I/O: 1460.9/750 net 710.6  Body mass index is 36.36 kg/m².   GCS:   15  General:   Patient was in bed and appeared uncomfortable  HEENT:  normocephalic and atraumatic.  No scleral icterus. PERR  Neck: supple.  No Thyromegaly.  Lungs: clear to auscultation.  No retractions  Cardiac: RRR.  No JVD.  Abdomen: soft.  Diffuse left-sided abdominal tenderness.  Nondistended normal active bowel sounds x 4.  Extremities:  No clubbing, cyanosis, or edema x 4.    Vasculature: capillary refill < 3 seconds. Palpable dorsalis pedis pulses.  Skin:  warm and dry.  Psych:  
head and neck completed and revealed ruptured ACOM aneurysm -> [facility] alert called -> going to cath lab w/Neuro for coiling. Presented to ICU post-coiling for continued monitoring.     Subjective 24 hours: Patient seen and evaluated at the bedside. She states that she did not sleep well and has continued to have a right sided  headache overnight. Nursing reporting that the patient slept for most of the night.     Past Medical History: Anxiety, substance use.  Family History: Unknown.  Social History: Substance use.     ROS   All systems reviewed and are negative other than as stated in the HPI.    Scheduled Meds:   lisinopril  20 mg Oral Daily    hydrALAZINE  25 mg Oral 3 times per day    naloxegol  12.5 mg Oral QAM AC    cloNIDine  0.1 mg Oral BID    buPROPion  300 mg Oral QAM    hydrOXYzine HCl  50 mg Oral Nightly    amLODIPine  10 mg Oral Daily    methadone  110 mg Oral Daily    famotidine  20 mg Oral BID    aspirin  81 mg Oral Daily    niMODipine  60 mg Oral 6 times per day    docusate sodium  100 mg Oral Daily    senna  1 tablet Oral Nightly    methylPREDNISolone  4 mg Oral QAM AC    methylPREDNISolone  4 mg Oral Nightly    pregabalin  50 mg Oral TID    heparin (porcine)  5,000 Units SubCUTAneous 3 times per day    sodium chloride flush  5-40 mL IntraVENous 2 times per day     Continuous Infusions:   niCARdipine 5 mg/hr (07/05/24 0553)    3% sodium chloride 105 mL/hr (07/05/24 0626)    sodium chloride         PHYSICAL EXAMINATION:  T:  98.3.  P:  74. RR:  18. B/P:  162/68.  FiO2:  21. O2 Sat:  97.  I/O:  3.8 L urine yesterday. +2.5 L net, +1.6 since admit  Body mass index is 36.36 kg/m².   GCS:   15    General:   age appearing female  HEENT:  normocephalic and atraumatic.  No scleral icterus. PERR  Neck: supple.  No Thyromegaly.  Lungs: clear to auscultation.  No retractions  Cardiac: RRR.  No JVD.  Abdomen: soft.  Nontender.  Extremities:  No clubbing, cyanosis, or edema x 4.    Vasculature: capillary refill < 
hyaluronidase.  Nursing requesting central line or PICC line for continued hypertonic sodium infusion.  Past Medical History: Unavailable.  Family History: Unavailable.  Social History: Multisubstance use.    ROS   Patient denies chest pain, shortness of breath, nausea, vomiting, abdominal pain, diarrhea, constipation    Scheduled Meds:   buPROPion  300 mg Oral QAM    hydrOXYzine HCl  50 mg Oral Nightly    methadone  110 mg Oral Daily    famotidine  20 mg Oral BID    aspirin  81 mg Oral Daily    niMODipine  60 mg Oral 6 times per day    docusate sodium  100 mg Oral Daily    senna  1 tablet Oral Nightly    methylPREDNISolone  4 mg Oral QAM AC    methylPREDNISolone  4 mg Oral Lunch    methylPREDNISolone  4 mg Oral Dinner    methylPREDNISolone  4 mg Oral Nightly    pregabalin  50 mg Oral TID    heparin (porcine)  5,000 Units SubCUTAneous 3 times per day    sodium chloride flush  5-40 mL IntraVENous 2 times per day    sodium chloride flush  5-40 mL IntraVENous 2 times per day     Continuous Infusions:   3% sodium chloride 50 mL/hr (07/03/24 0427)    niCARdipine 5 mg/hr (07/03/24 0011)    sodium chloride         PHYSICAL EXAMINATION:  T: 97.7.  P: 71. RR: 22. B/P: 134/57. O2 Sat: 98 via room air.  I/O: 267/0  Body mass index is 36.36 kg/m².   GCS:   15  General:   Patient in bed and appeared uncomfortable  HEENT:  normocephalic and atraumatic.  No scleral icterus. PERR  Neck: supple.  No Thyromegaly.  Lungs: clear to auscultation.  No retractions  Cardiac: RRR.  No JVD.  Abdomen: soft.  Nontender.  Extremities:  No clubbing, cyanosis, or edema x 4.    Vasculature: capillary refill < 3 seconds. Palpable dorsalis pedis pulses.  Skin:  warm and dry.  Psych:  Alert and oriented x3.  Affect appropriate  Lymph:  No supraclavicular adenopathy.  Neurologic:  No focal deficit. No seizures.  EOMI, PERRLA    Data: (All radiographs, tracings, PFTs, and imaging are personally viewed and interpreted unless otherwise noted).   Sodium 
of as needed pain control.  Patient was lying in bed this morning and minimally responsive to questions and commands.    Past Medical History: Unavailable  Family History: Unavailable.  Social History: Multisubstance abuse.    ROS   Unable to assess due to patient status.    Scheduled Meds:   sodium chloride flush  5-40 mL IntraVENous 2 times per day    sodium chloride flush  5-40 mL IntraVENous 2 times per day    methylPREDNISolone  250 mg IntraVENous Q24H    gabapentin  300 mg Oral TID     Continuous Infusions:   niCARdipine Stopped (07/01/24 0104)    sodium chloride         PHYSICAL EXAMINATION:  T: 98.1.  P: 101. RR: 24. B/P: 139/63 via cuff. O2 Sat: 97% via room air.    I/O: 14 23/2200  Body mass index is 36.36 kg/m².   GCS:   11  General:   Patient examined this morning lying in bed.  minimally responsive to questioning  HEENT:  normocephalic and atraumatic.  No scleral icterus. PERR  Neck: supple.  No Thyromegaly.  Lungs: clear to auscultation.  No retractions  Cardiac: RRR.  No JVD.  No M/R/G  Abdomen: soft.  Nontender, nondistended.  BS normoactive x 4  Extremities:  No clubbing, cyanosis, or edema x 4.    Vasculature: capillary refill < 3 seconds. Palpable dorsalis pedis pulses.  Skin:  warm and dry.  Psych:  Alert and oriented x3.  Affect appropriate  Lymph:  No supraclavicular adenopathy.  Neurologic:  No focal deficit. No seizures.    Data: (All radiographs, tracings, PFTs, and imaging are personally viewed and interpreted unless otherwise noted).   Sodium 137, chloride 103, potassium 3.9, BUN 13, creatinine of 1.2, CO2 of 19, glucose 179  WBC 18.7, hemoglobin 14.2, hematocrit 41.3, platelets 270.  Urine drug screen positive for fentanyl  Most recent CT demonstrating subarachnoid hemorrhages and increased hydrocephalus compared to initial CT yesterday  CTA showing berry aneurysm  EKG revealing septal infarct of unknown age        Seen with multidisciplinary ICU team.  Meets Continued ICU Level Care 
to perform ROS: Acuity of condition   Gastrointestinal:  Positive for nausea and vomiting.   Neurological:  Positive for headaches.     Medications   Scheduled Meds:    aspirin  81 mg Oral Daily    niMODipine  60 mg Oral 6 times per day    docusate sodium  100 mg Oral Daily    senna  1 tablet Oral Nightly    sodium chloride flush  5-40 mL IntraVENous 2 times per day    sodium chloride flush  5-40 mL IntraVENous 2 times per day    gabapentin  300 mg Oral TID     Continuous Infusions:    niCARdipine 2.5 mg/hr (24 1403)    sodium chloride       PRN Meds: morphine, polyethylene glycol, sodium chloride flush, sodium chloride, sodium chloride flush, acetaminophen, ondansetron  Medications Prior to Admission:   No current facility-administered medications on file prior to encounter.     Current Outpatient Medications on File Prior to Encounter   Medication Sig Dispense Refill    methadone 10 MG/5ML solution Take 5 mLs by mouth daily. Max Daily Amount: 10 mg       Past History    Past Medical History:   has no past medical history on file.    Social History:   reports that she has been smoking cigarettes. She has a 30.0 pack-year smoking history. She has never used smokeless tobacco. She reports that she does not currently use alcohol. She reports current drug use. Drug: Marijuana (Weed).     Family History: No family history on file.    Physical Examination      Vitals:  /62   Pulse 70   Temp 98.4 °F (36.9 °C) (Oral)   Resp 16   Ht 1.499 m (4' 11\")   Wt 81.6 kg (180 lb)   SpO2 98%   BMI 36.36 kg/m²   Temp (24hrs), Av.5 °F (36.9 °C), Min:98.2 °F (36.8 °C), Max:98.8 °F (37.1 °C)      I/O (24Hr):    Intake/Output Summary (Last 24 hours) at 2024 1448  Last data filed at 2024 1403  Gross per 24 hour   Intake 1888.49 ml   Output 2650 ml   Net -761.51 ml         Physical Exam  Vitals reviewed.   Constitutional:       General: She is not in acute distress.     Appearance: Normal appearance. She is 
 P:  78. RR:  15. B/P:  172/91.  FiO2:  21. O2 Sat:  99.  I/O:  urine out 3600 mL and net + 600 mL.   Body mass index is 36.36 kg/m².   GCS:   15    General:   chronically ill appearing female. Appearing stated age.   HEENT:  normocephalic and atraumatic.  No scleral icterus. PERR  Neck: supple.  No Thyromegaly.  Lungs: clear to auscultation.  No retractions  Cardiac: RRR.  No JVD.  Abdomen: soft.  Nontender.  Extremities:  No clubbing, cyanosis, or edema x 4.    Vasculature: capillary refill < 3 seconds. Palpable dorsalis pedis pulses.  Skin:  warm and dry.  Psych:  Alert and oriented x3.  Affect appropriate  Lymph:  No supraclavicular adenopathy.  Neurologic:  No focal deficit. No seizures.    Data: (All radiographs, tracings, PFTs, and imaging are personally viewed and interpreted unless otherwise noted).   Sodium 141, K 3.4, cl 108, co2 21, bun 8, creat 0.7, mag 1.7, glucose 8.5, calcium 8.5.   WBC 11.5, hgb 10.4, platelet 244.   Telemetry shows sinus rhythm.     CXR with no infiltrates.    Seen with multidisciplinary ICU team .  Meets Continued ICU Level Care Criteria:    [x] Yes   [] No - Transfer Planned to listed location:  [] HOSPITALIST CONTACTED-      Case and plan discussed with Dr. Oakley.    Electronically signed by Gerardo Ramirez,   CRITICAL CARE SPECIALIST  Patient seen by me including key components of medical care.  Case discussed with resident physician.  Continue with hypertonic saline and nimodipine.  Continue to assess for vasospasm.  Italicized bold font, if present,  represents changes to the note made by me.  CC time 35 minutes.  Time was discontiguous. Time does not include procedure. Time does include my direct assessment of the patient and coordination of care.  Time represents more than 50% of the time involved with patient care by the CC team.  Electronically signed by Josef Oakley MD.     
  Head: Normocephalic and atraumatic.      Right Ear: External ear normal.      Left Ear: External ear normal.      Nose: Nose normal.      Mouth/Throat:      Mouth: Mucous membranes are moist.      Pharynx: No oropharyngeal exudate or posterior oropharyngeal erythema.   Eyes:      Extraocular Movements: EOM normal.      Pupils: Pupils are equal, round, and reactive to light.   Cardiovascular:      Rate and Rhythm: Normal rate.   Pulmonary:      Effort: Pulmonary effort is normal. No respiratory distress.   Abdominal:      Palpations: Abdomen is soft.      Tenderness: There is no abdominal tenderness.   Musculoskeletal:         General: Normal range of motion.      Right lower leg: No edema.      Left lower leg: No edema.   Skin:     General: Skin is warm.      Findings: No rash.   Neurological:      Mental Status: She is alert.      Coordination: Finger-Nose-Finger Test and Heel to Shin Test normal.   Psychiatric:         Mood and Affect: Mood normal.         Speech: Speech normal.         Behavior: Behavior normal.         Neurologic Exam     Mental Status   Oriented to person.   Oriented to place.   Oriented to year and month.   Follows 1 step commands.   Attention: normal. Concentration: normal.   Speech: speech is normal   Level of consciousness: alert  Knowledge: good.   Able to repeat. Normal comprehension.     Cranial Nerves     CN II   Visual fields full to confrontation.     CN III, IV, VI   Pupils are equal, round, and reactive to light.  Extraocular motions are normal.   Right pupil: Shape: regular. Reactivity: brisk.   Left pupil: Shape: regular. Reactivity: brisk.     CN V   Facial sensation intact.     CN VII   Facial expression full, symmetric.     CN VIII   CN VIII normal.     CN IX, X   CN IX normal.   CN X normal.   Palate: symmetric    CN XI   CN XI normal.   Right trapezius strength: normal  Left trapezius strength: normal    CN XII   CN XII normal.   Tongue deviation: none    Motor Exam 
There is subarachnoid and intraventricular hemorrhage seen best on the noncontrast CT scan of the brain.  KUB 7/6/24 report: Nonobstructive bowel gas pattern       Case discussed with Dr. Fields.  Electronically signed by TERRI Lincoln-SOUMYA      Addendum by Dr. Diamond MD:  Patient seen by me independently including key components of medical care. Face to face evaluation and examination was performed. Case discussed with .Johnson Poole CNP. Agree with Certified nurse practitioner's findings and plan as documented in the Certified nurse practitioner's note.   Italicized font, if present,  represents changes to the note made by me.   More than 50% of the encounter time involved with patient care by the Pulmonary & Critical care service team spent by me.    Please see my modifications mentioned below:  Patient is in no distress.  Having occasional headaches-Improving with pain medication    No results found for: \"PH\", \"PCO2\", \"PO2\", \"HCO3\", \"O2SAT\"  No results found for: \"IFIO2\", \"MODE\", \"SETTIDVOL\", \"SETPEEP\"    CBC:   Recent Labs     07/05/24  0340 07/06/24  0630 07/07/24  0610   WBC 14.6* 12.2* 17.1*   HGB 12.6 11.0* 11.6*   HCT 39.3 35.0* 35.8*    220 250     BMP:  Recent Labs     07/06/24  0800 07/06/24  1155 07/07/24  0610 07/07/24  0820 07/07/24  1230 07/07/24  1600 07/07/24  2125      < > 142   < > 144 143 143   K 3.9   < > 3.8   < > 3.6 3.3* 3.4*      < > 109   < > 110 110 108   CO2 20*   < > 21*   < > 22* 22* 22*   BUN 9   < > 7   < > 6* 7 7   CREATININE 0.8   < > 0.7   < > 0.7 0.7 0.6   GLUCOSE 96   < > 107   < > 106 91 126*   MG 1.6  --  1.6  --   --   --   --    CALCIUM 7.8*   < > 8.3*   < > 8.8 8.5 8.6    < > = values in this interval not displayed.     Hepatic: No results for input(s): \"AST\", \"ALT\", \"BILITOT\", \"ALKPHOS\", \"AMYLASE\", \"LIPASE\" in the last 72 hours.    Invalid input(s): \"ALB\"  Cardiac Enzymes: No results for input(s): \"CKTOTAL\", \"CKMB\", \"TROPONINI\" in the last 72 
docusate sodium  100 mg Oral BID    senna  2 tablet Oral Nightly    naloxegol  12.5 mg Oral QAM AC    buPROPion  300 mg Oral QAM    hydrOXYzine HCl  50 mg Oral Nightly    amLODIPine  10 mg Oral Daily    methadone  110 mg Oral Daily    famotidine  20 mg Oral BID    aspirin  81 mg Oral Daily    niMODipine  60 mg Oral 6 times per day    pregabalin  50 mg Oral TID    heparin (porcine)  5,000 Units SubCUTAneous 3 times per day    sodium chloride flush  5-40 mL IntraVENous 2 times per day       24HR INTAKE/OUTPUT:    Intake/Output Summary (Last 24 hours) at 7/6/2024 2024  Last data filed at 7/6/2024 1825  Gross per 24 hour   Intake 5800.48 ml   Output 5600 ml   Net 200.48 ml       DVT prophylaxis reviewed.  Heparin 5000 units subcu every 8 hourly.  Marika Peters educated about my impression and plan. She verbalizes understanding.    Electronically signed by   Candy Fields MD on 7/6/2024 at 8:23 PM                                             
07/06/24  0800 07/06/24  1155 07/07/24  0610 07/07/24  0820 07/08/24  2058 07/08/24  2355 07/09/24  0515      < > 142   < > 136 142 141   K 3.9   < > 3.8   < > 3.7 3.6 3.4*      < > 109   < > 102 108 108   CO2 20*   < > 21*   < > 21* 22* 21*   BUN 9   < > 7   < > 8 7 8   CREATININE 0.8   < > 0.7   < > 0.6 0.6 0.7   GLUCOSE 96   < > 107   < > 103 103 101   MG 1.6  --  1.6  --   --   --  1.7    < > = values in this interval not displayed.       COAGULATION STUDIES:No results for input(s): \"PROTIME\", \"INR\", \"APTT\" in the last 72 hours.    LIVER PROFILE:  No results for input(s): \"AST\", \"ALT\", \"BILIDIR\", \"BILITOT\", \"ALKPHOS\" in the last 72 hours.    CHOLESTEROL AND A1C:  Recent Labs     07/02/24  0435   HDL 52   CHOL 197   TRIG 112        Imaging Last 24 Hours:  CT HEAD WO CONTRAST    Result Date: 7/7/2024  CT head without contrast Comparison: CT/SR - CTA HEAD W WO CONTRAST - 07/05/2024 10:49 AM EDT CT/SR - CT HEAD WO CONTRAST - 07/05/2024 10:49 AM EDT Findings: Postsurgical changes of RENZO aneurysm coiling. Decreased volume and conspicuity of subarachnoid hemorrhage seen previously. No evidence of new intracranial hemorrhage. No mass effect or midline shift. Gray-white matter differentiation is maintained. Ventricular system and basilar cisterns patent. Orbital structures normal. Paranasal sinuses and mastoid air cells clear. No significant bone abnormality.     1. Decreased but not entirely resolved subarachnoid hemorrhage centered primarily in the inferior aspect of the interhemispheric fissure, left sylvian fissure, and scattered in the intervening sulci. This document has been electronically signed by: Julio C Vaca MD on 07/07/2024 09:10 PM All CTs at this facility use dose modulation techniques and iterative reconstructions, and/or weight-based dosing when appropriate to reduce radiation to a low as reasonably achievable.    XR ABDOMEN (KUB) (SINGLE AP VIEW)    Result Date: 7/6/2024  PROCEDURE: XR 
6/30/2024.. TECHNIQUE: 1 mm CT axial images were obtained through the head after the fast bolus administration of contrast. A noncontrast localizer was obtained. 3-D reconstructions were performed on a dedicated 3-D workstation. These include multiplanar MPR images and multiplanar MIP images. Isovue intravenous contrast was given. All CT scans at this facility use dose modulation, iterative reconstruction, and/or weight-based dosing when appropriate to reduce radiation dose to as low as reasonably achievable. FINDINGS: Internal carotid arteries: There is antegrade flow in the internal carotid arteries bilaterally.. Middle cerebral arteries: There is antegrade flow in the middle cerebral arteries bilaterally.. 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 Normal antegrade flow in the anterior cerebral arteries.. Vertebral arteries: There is a larger right and smaller left vertebral arteries with antegrade flow bilaterally. Basilar artery: There is antegrade flow in the basilar artery.. Superior cerebellar arteries: There is antegrade flow in the right and left superior cerebellar arteries.. Posterior cerebral arteries: There is antegrade flow in the posterior cerebral arteries bilaterally. The superior sagittal sinus, vein of Geoffrey, internal cerebral veins, straight sinus, transverse sinuses and sigmoid sinuses are patent. There  is subarachnoid and intraventricular hemorrhage seen best on the noncontrast CT scan of the brain..     1. 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 2. Otherwise negative CTA brain. 3. There is subarachnoid and intraventricular hemorrhage seen best on the noncontrast CT scan of the brain. **This report has been created using voice recognition software. It may contain minor errors which are inherent in voice recognition technology.** Electronically signed by Dr. Hugh Campa    CT HEAD WO CONTRAST    Result Date: 
MD Roger on 07/10/2024 06:46 PM All CTs at this facility use dose modulation techniques and iterative reconstructions, and/or weight-based dosing when appropriate to reduce radiation to a low as reasonably achievable. 3D Post-processing was performed on this study.      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 Nye.   Modified Grimes score: Grade 4  Christensen and kurtz score: Grade 1(30% mortality)  1 g of TXA ordered 6/30/2024  Repeat CT head and CTA head were completed due to patient's increased headache and nausea and vomiting.  7/5 CT head revealed improvement of residual subarachnoid residual subarachnoid and intraventricular hemorrhage   7/5 CTA head - no acute findings.  Status post coiling of ACOM aneurysm  7/7 Repeat CT head - shows decreased, but not entirely resolved subarachnoid hemorrhage  7/10 - CTA head was read as decreased flow in the anterior cerebral arteries with high-grade narrowing versus short-segment occlusion on the left similar to previous. High-grade narrowing versus short-segment occlusion M2 segment left middle cerebral artery increased from previous.  Stable and no vasospasm noted per Dr. Rhodes's review  Repeat CTA head ordered 7/14 - stable without vasospasm.  TCD 7/3 showing no significant vasospasm.   Primary team to decrease 3% saline by 25ml/hr per day to maintain a Na goal of 141-144. Na 138. Sodium checks q4.  Patient encouraged to eat salty fluids and to drink water with no fluid restriction per Dr. Rhodes. No caffine limitations.   Patient to remain euvolemic.    -160   Nimotop x 21 days. Started 7/1  Aspirin 81mg began 7/1  Toradol prn   Methadone was restarted 7/2  Lyrica 75 mg 3 times daily for headache  Avoid constipation.   On subcutaneous heparin   Apply SCDs when in bed  Encourage patient to ambulate in the halls and 
findings.  Status post coiling of ACOM aneurysm  7/7 Repeat CT head - shows decreased, but not entirely resolved subarachnoid hemorrhage  7/10 - CTA head was read as decreased flow in the anterior cerebral arteries with high-grade narrowing versus short-segment occlusion on the left similar to previous. High-grade narrowing versus short-segment occlusion M2 segment left middle cerebral artery increased from previous.  Stable and no vasospasm noted per Dr. Rhodes's review    TCD 7/3 showing no significant vasospasm.   Primary team to decrease 3% saline by 25ml/hr per day to maintain a Na goal of 141-144. Na 143. Sodium checks q4.  Patient encouraged to eat salty fluids and to drink water with no fluid restriction per Dr. Rhodes. No caffine limitations.   Patient to remain euvolemic.    -160   Nimotop x 21 days. Started 7/1  Aspirin 81mg began 7/1  Toradol prn   Methadone was restarted 7/2  Lyrica 75 mg 3 times daily for headache    Avoid constipation.   On subcutaneous heparin   Apply SCDs when in bed    Encourage patient to ambulate in the halls and get up to the chair.     Neurology following. Please reach out with any further questions or concerns.    This patient was seen and evaluated with Dr. Rhodes and he is in agreement with the assessment and plan.    Electronically signed by TERRI Maya CNP on 7/11/24 at 6:59 PM EDT       
narrowing versus short-segment occlusion on the left similar to previous. High-grade narrowing versus short-segment occlusion M2 segment left middle cerebral artery increased from previous.  Stable and no vasospasm noted per Dr. Rhodes's review    TCD 7/3 showing no significant vasospasm.   Primary team to decrease 3% saline to 75ml/hr. Stop normal saline. Continue to decrease 3% by 25ml/hr per day.   Sodium 141-144. Sodium checks q2.  Patient encouraged to eat salty fluids and to drink water with no fluid restriction per Dr. Rhodes. No caffine limitations.   Patient to remain euvolemic.    -160   Nimotop x 21 days. Started 7/1  Aspirin 81mg began 7/1    Medrol dose pack started on 7/1/2024 for headache. Recommend against the use of Morphine. Toradol prn   Methadone was restarted 7/2  Lyrica 75 mg 3 times daily for headache    Avoid constipation. Given laxative 7/10 - successful   On subcutaneous heparin   Apply SCDs when in bed    WBC 10.4 (was 9.9 yesterday)  Encourage patient to ambulate in the halls and get up to the chair.     Neurology following. Please reach out with any further questions or concerns.    This patient was seen and evaluated with Dr. Rhodes and he is in agreement with the assessment and plan.    Electronically signed by TERRI Maya CNP on 7/11/24 at 6:59 PM EDT       
reconstructions, and/or weight-based dosing when appropriate to reduce radiation to a low as reasonably achievable. 3D Post-processing was performed on this study.      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 Nye.   Modified Grimes score: Grade 4  Christensen and kurtz score: Grade 1(30% mortality)  1 g of TXA ordered 6/30/2024  Repeat CT head and CTA head were completed due to patient's increased headache and nausea and vomiting.  7/5 CT head revealed improvement of residual subarachnoid residual subarachnoid and intraventricular hemorrhage   7/5 CTA head - no acute findings.  Status post coiling of ACOM aneurysm  7/7 Repeat CT head - shows decreased, but not entirely resolved subarachnoid hemorrhage  7/10 - CTA head was read as decreased flow in the anterior cerebral arteries with high-grade narrowing versus short-segment occlusion on the left similar to previous. High-grade narrowing versus short-segment occlusion M2 segment left middle cerebral artery increased from previous.  Stable and no vasospasm noted per Dr. Rhodes's review  Repeat CTA head ordered for tomorrow morning.  TCD 7/3 showing no significant vasospasm.   Primary team to decrease 3% saline by 25ml/hr per day to maintain a Na goal of 141-144. Na 143. Sodium checks q4.  Patient encouraged to eat salty fluids and to drink water with no fluid restriction per Dr. Rhodes. No caffine limitations.   Patient to remain euvolemic.    -160   Nimotop x 21 days. Started 7/1  Aspirin 81mg began 7/1  Toradol prn   Methadone was restarted 7/2  Lyrica 75 mg 3 times daily for headache  Avoid constipation.   On subcutaneous heparin   Apply SCDs when in bed  Encourage patient to ambulate in the halls and get up to the chair.     Neurology following. Please reach out with any further questions or concerns.    This

## 2024-07-14 NOTE — DISCHARGE INSTRUCTIONS
Start taking norvasc 10 mg daily and lisinopril 10mg daily.  Continue taking Nimotop 60mg every 4hrs until 7/21/2024.  Will need follow up with Neurology in 3 months for repeat angiogram. Neurology office will reach out to schedule

## 2024-07-14 NOTE — DISCHARGE INSTR - DIET

## 2024-07-14 NOTE — DISCHARGE INSTR - COC
Continuity of Care Form    Patient Name: Marika Peters   :  1975  MRN:  [id number]    Admit date:  2024  Discharge date:  ***    Code Status Order: Full Code   Advance Directives:     Admitting Physician:  Josef Oakley MD  PCP: Kathy Buenrostro, TERRI - CNP    Discharging Nurse: ***  Discharging Hospital Unit/Room#: 4D-06/006-A  Discharging Unit Phone Number: ***    Emergency Contact:   Extended Emergency Contact Information  Primary Emergency Contact: Viola Forbes  Home Phone: [phone]  Relation: Child  Preferred language: English   needed? No  Secondary Emergency Contact: Masha Gage  Mobile Phone: [phone]  Relation: Child    Past Surgical History:  No past surgical history on file.    Immunization History:     There is no immunization history on file for this patient.    Active Problems:  Patient Active Problem List   Diagnosis Code    SAH (subarachnoid hemorrhage) ([facility]) I60.9    Ruptured cerebral aneurysm ([facility]) I60.7    Hypertension I10       Isolation/Infection:   Isolation            No Isolation          Patient Infection Status       None to display            Nurse Assessment:  Last Vital Signs: BP (!) 143/55   Pulse 69   Temp 98.2 °F (36.8 °C) (Oral)   Resp 16   Ht 1.499 m (4' 11\")   Wt 81.6 kg (180 lb)   SpO2 99%   BMI 36.36 kg/m²     Last documented pain score (0-10 scale): Pain Level: 3  Last Weight:   Wt Readings from Last 1 Encounters:   24 81.6 kg (180 lb)     Mental Status:  {IP PT MENTAL STATUS:39258}    IV Access:  { RAMOS IV ACCESS:369086954}    Nursing Mobility/ADLs:  Walking   {CHP DME ADLs:497961140}  Transfer  {CHP DME ADLs:331357601}  Bathing  {CHP DME ADLs:549171489}  Dressing  {CHP DME ADLs:684562647}  Toileting  {CHP DME ADLs:714939759}  Feeding  {CHP DME ADLs:303201384}  Med Admin  {CHP DME ADLs:438854305}  Med Delivery   { RAMOS MED Delivery:818773431}    Wound Care Documentation and Therapy:        Elimination:  Continence:

## 2024-07-14 NOTE — DISCHARGE SUMMARY
the head and neck.   3. 1.3 x 1.2 cm hypodensity in the right lobe of the thyroid gland.               **This report has been created using voice recognition software. It may contain   minor errors which are inherent in voice recognition technology.**      Electronically signed by Dr. Hugh Campa      CT Head W/O Contrast   Final Result   Moderately extensive subarachnoid hemorrhage..         **This report has been created using voice recognition software.  It may contain   minor errors which are inherent in voice recognition technology.**      Electronically signed by Dr. Jose G Rae      CT CSpine W/O Contrast   Final Result   No acute findings.            **This report has been created using voice recognition software.  It may contain   minor errors which are inherent in voice recognition technology.**            Electronically signed by Dr. Jose G Rae           Consults:   IP CONSULT TO NEUROLOGY  IP CONSULT TO VASCULAR ACCESS TEAM    Disposition: Home  Condition at Discharge: Stable    Code Status:  Full Code     Patient Instructions:    Discharge lab work: angiogram in 3 months  Activity: activity as tolerated  Diet: ADULT ORAL NUTRITION SUPPLEMENT; Breakfast; Clear Liquid Oral Supplement  ADULT DIET; Regular      Follow-up visits:   No follow-up provider specified.       Discharge Medications:      Medication List        START taking these medications      amLODIPine 10 MG tablet  Commonly known as: NORVASC  Take 1 tablet by mouth daily  Start taking on: July 15, 2024     aspirin 81 MG chewable tablet  Take 1 tablet by mouth daily  Start taking on: July 15, 2024     docusate 100 MG Caps  Commonly known as: COLACE, DULCOLAX  Take 100 mg by mouth 2 times daily     lisinopril 10 MG tablet  Commonly known as: PRINIVIL;ZESTRIL  Take 1 tablet by mouth daily  Start taking on: July 15, 2024     niMODipine 30 MG capsule  Commonly known as: NIMOTOP  Take 2 capsules by mouth every 4 hours for 7 days     senna 8.6

## 2024-07-14 NOTE — PLAN OF CARE
Problem: Discharge Planning  Goal: Discharge to home or other facility with appropriate resources  7/11/2024 0846 by Basilia Honeycutt RN  Outcome: Progressing  7/11/2024 0037 by Olga Reyna RN  Outcome: Progressing  Flowsheets (Taken 7/10/2024 0800 by Ghulam Royal, RN)  Discharge to home or other facility with appropriate resources: Identify barriers to discharge with patient and caregiver     Problem: Pain  Goal: Verbalizes/displays adequate comfort level or baseline comfort level  7/11/2024 0846 by Basilia Honeycutt RN  Outcome: Progressing  7/11/2024 0037 by Olga Reyna, RN  Outcome: Progressing  Flowsheets (Taken 7/8/2024 2000 by Sameera Caceres, RN)  Verbalizes/displays adequate comfort level or baseline comfort level:   Encourage patient to monitor pain and request assistance   Assess pain using appropriate pain scale   Administer analgesics based on type and severity of pain and evaluate response   Implement non-pharmacological measures as appropriate and evaluate response   Consider cultural and social influences on pain and pain management   Notify Licensed Independent Practitioner if interventions unsuccessful or patient reports new pain     Problem: Safety - Adult  Goal: Free from fall injury  7/11/2024 0846 by Basilia Honeycutt RN  Outcome: Progressing  7/11/2024 0037 by Olga Reyna, RN  Outcome: Progressing  Flowsheets (Taken 7/9/2024 2000 by Helga Jacobs, RN)  Free From Fall Injury: Instruct family/caregiver on patient safety     Problem: Neurosensory - Adult  Goal: Achieves stable or improved neurological status  Outcome: Progressing  Goal: Absence of seizures  Outcome: Progressing  Goal: Achieves maximal functionality and self care  Outcome: Progressing     Problem: Hematologic - Adult  Goal: Maintains hematologic stability  Outcome: Progressing     Problem: Skin/Tissue Integrity  Goal: Absence of new skin breakdown  Description: 1.  Monitor for areas of redness and/or 
  Problem: Discharge Planning  Goal: Discharge to home or other facility with appropriate resources  7/12/2024 0833 by Basilia Honeycutt RN  Outcome: Progressing  7/12/2024 0628 by Betina Prado RN  Outcome: Progressing     Problem: Pain  Goal: Verbalizes/displays adequate comfort level or baseline comfort level  7/12/2024 0833 by Basilia Honeycutt RN  Outcome: Progressing  7/12/2024 0628 by Betina Prado RN  Outcome: Progressing     Problem: Safety - Adult  Goal: Free from fall injury  7/12/2024 0833 by Basilia Honeycutt RN  Outcome: Progressing  7/12/2024 0628 by Betina Prado RN  Outcome: Progressing     Problem: Neurosensory - Adult  Goal: Achieves stable or improved neurological status  7/12/2024 0833 by Basilia Honeycutt RN  Outcome: Progressing  7/12/2024 0628 by Betina Prado RN  Outcome: Progressing  Goal: Absence of seizures  7/12/2024 0833 by Basilia Honeycutt RN  Outcome: Progressing  7/12/2024 0628 by Betina Prado RN  Outcome: Progressing  Goal: Achieves maximal functionality and self care  7/12/2024 0833 by Basilia Honeycutt RN  Outcome: Progressing  7/12/2024 0628 by Betina Prado RN  Outcome: Progressing     Problem: Hematologic - Adult  Goal: Maintains hematologic stability  7/12/2024 0833 by Basilia Honeycutt RN  Outcome: Progressing  7/12/2024 0628 by Betina Prado RN  Outcome: Progressing     Problem: Skin/Tissue Integrity  Goal: Absence of new skin breakdown  Description: 1.  Monitor for areas of redness and/or skin breakdown  2.  Assess vascular access sites hourly  3.  Every 4-6 hours minimum:  Change oxygen saturation probe site  4.  Every 4-6 hours:  If on nasal continuous positive airway pressure, respiratory therapy assess nares and determine need for appliance change or resting period.  7/12/2024 0833 by Basilia Honeycutt RN  Outcome: Progressing  7/12/2024 0628 by Betina Prado RN  Outcome: Progressing     Problem: ABCDS Injury Assessment  Goal: Absence 
  Problem: Discharge Planning  Goal: Discharge to home or other facility with appropriate resources  7/14/2024 1325 by Amy Pimentel RN  Outcome: Completed  7/14/2024 0557 by Rosanna Gonzales RN  Outcome: Progressing  Flowsheets (Taken 7/13/2024 2009)  Discharge to home or other facility with appropriate resources:   Identify barriers to discharge with patient and caregiver   Arrange for needed discharge resources and transportation as appropriate   Identify discharge learning needs (meds, wound care, etc)   Refer to discharge planning if patient needs post-hospital services based on physician order or complex needs related to functional status, cognitive ability or social support system     Problem: Pain  Goal: Verbalizes/displays adequate comfort level or baseline comfort level  7/14/2024 1325 by Amy Pimentel RN  Outcome: Completed  7/14/2024 0557 by Rosanna Gonzales RN  Outcome: Progressing  Flowsheets (Taken 7/13/2024 2009)  Verbalizes/displays adequate comfort level or baseline comfort level:   Encourage patient to monitor pain and request assistance   Assess pain using appropriate pain scale   Administer analgesics based on type and severity of pain and evaluate response   Implement non-pharmacological measures as appropriate and evaluate response   Consider cultural and social influences on pain and pain management   Notify Licensed Independent Practitioner if interventions unsuccessful or patient reports new pain     Problem: Safety - Adult  Goal: Free from fall injury  7/14/2024 1325 by Amy Pimentel RN  Outcome: Completed  7/14/2024 0557 by Rosanna Gonzales RN  Outcome: Progressing  Flowsheets (Taken 7/9/2024 2000 by Helga Jacobs, RN)  Free From Fall Injury: Instruct family/caregiver on patient safety     Problem: Neurosensory - Adult  Goal: Achieves stable or improved neurological status  7/14/2024 1325 by Amy Pimentel RN  Outcome: Completed  7/14/2024 0557 by Rosanna Gonzales 
  Problem: Discharge Planning  Goal: Discharge to home or other facility with appropriate resources  7/8/2024 0928 by Tigist Cevallos RN  Outcome: Progressing  Flowsheets (Taken 7/8/2024 0800)  Discharge to home or other facility with appropriate resources: Identify barriers to discharge with patient and caregiver     Problem: Pain  Goal: Verbalizes/displays adequate comfort level or baseline comfort level  7/8/2024 0928 by Tigist Cevallos RN  Outcome: Progressing  Flowsheets (Taken 7/8/2024 0800)  Verbalizes/displays adequate comfort level or baseline comfort level:   Assess pain using appropriate pain scale   Encourage patient to monitor pain and request assistance  Note: Patient reports pain as a 7 on a 0-10 scale this shift. Patient's stated pain goal is no pain. Pain is acute and located in the posterior head, described as aching. Non-pharmacological pain interventions include environmental changes, low stimulation, and rest. Pharmacological pain interventions on board per physician's order.        Problem: Safety - Adult  Goal: Free from fall injury  7/8/2024 0928 by Tigist Cevallos RN  Outcome: Progressing  Flowsheets (Taken 7/8/2024 0928)  Free From Fall Injury: Instruct family/caregiver on patient safety  Note: Patient remains free from falls this shift. Fall precautions in place with bed/chair exit alarmed. Fall sign posted and fall armband in place. Nonskid footwear used with transferring. Educated patient to use call light when in need of staff assistance with transferring, ambulating, and other activities of daily living. Patient appropriately uses call light this shift.        Problem: Neurosensory - Adult  Goal: Achieves stable or improved neurological status  7/8/2024 0928 by Tigist Cevallos, RN  Outcome: Progressing  Flowsheets (Taken 7/8/2024 0800)  Achieves stable or improved neurological status: Assess for and report changes in neurological status     Problem: Neurosensory - Adult  Goal: Absence of 
  Problem: Discharge Planning  Goal: Discharge to home or other facility with appropriate resources  Outcome: Progressing     Problem: Pain  Goal: Verbalizes/displays adequate comfort level or baseline comfort level  Outcome: Progressing     Problem: Safety - Adult  Goal: Free from fall injury  Outcome: Progressing     Problem: Neurosensory - Adult  Goal: Achieves stable or improved neurological status  Outcome: Progressing  Goal: Absence of seizures  Outcome: Progressing  Goal: Achieves maximal functionality and self care  Outcome: Progressing     Problem: Hematologic - Adult  Goal: Maintains hematologic stability  Outcome: Progressing     Problem: Respiratory - Adult  Goal: Achieves optimal ventilation and oxygenation  Outcome: Progressing     Problem: Cardiovascular - Adult  Goal: Maintains optimal cardiac output and hemodynamic stability  Outcome: Progressing     Problem: Genitourinary - Adult  Goal: Absence of urinary retention  Outcome: Progressing     Problem: Skin/Tissue Integrity  Goal: Absence of new skin breakdown  Description: 1.  Monitor for areas of redness and/or skin breakdown  2.  Assess vascular access sites hourly  3.  Every 4-6 hours minimum:  Change oxygen saturation probe site  4.  Every 4-6 hours:  If on nasal continuous positive airway pressure, respiratory therapy assess nares and determine need for appliance change or resting period.  Outcome: Progressing     Problem: ABCDS Injury Assessment  Goal: Absence of physical injury  Outcome: Progressing     Problem: Chronic Conditions and Co-morbidities  Goal: Patient's chronic conditions and co-morbidity symptoms are monitored and maintained or improved  Outcome: Progressing     Problem: Nutrition Deficit:  Goal: Optimize nutritional status  Outcome: Progressing     
  Problem: Discharge Planning  Goal: Discharge to home or other facility with appropriate resources  Outcome: Progressing  Flowsheets (Taken 7/1/2024 0011)  Discharge to home or other facility with appropriate resources: Identify barriers to discharge with patient and caregiver     Problem: Pain  Goal: Verbalizes/displays adequate comfort level or baseline comfort level  Outcome: Progressing  Flowsheets (Taken 7/1/2024 0011)  Verbalizes/displays adequate comfort level or baseline comfort level:   Encourage patient to monitor pain and request assistance   Assess pain using appropriate pain scale   Administer analgesics based on type and severity of pain and evaluate response   Implement non-pharmacological measures as appropriate and evaluate response   Notify Licensed Independent Practitioner if interventions unsuccessful or patient reports new pain     Problem: Safety - Adult  Goal: Free from fall injury  Outcome: Progressing  Flowsheets (Taken 7/1/2024 0011)  Free From Fall Injury: Instruct family/caregiver on patient safety     Problem: Safety - Medical Restraint  Goal: Remains free of injury from restraints (Restraint for Interference with Medical Device)  Description: INTERVENTIONS:  1. Determine that other, less restrictive measures have been tried or would not be effective before applying the restraint  2. Evaluate the patient's condition at the time of restraint application  3. Inform patient/family regarding the reason for restraint  4. Q2H: Monitor safety, psychosocial status, comfort, nutrition and hydration  Outcome: Progressing  Flowsheets (Taken 7/1/2024 0011)  Remains free of injury from restraints (restraint for interference with medical device):   Determine that other, less restrictive measures have been tried or would not be effective before applying the restraint   Evaluate the patient's condition at the time of restraint application   Every 2 hours: Monitor safety, psychosocial status, comfort, 
  Problem: Discharge Planning  Goal: Discharge to home or other facility with appropriate resources  Outcome: Progressing  Flowsheets (Taken 7/1/2024 1200)  Discharge to home or other facility with appropriate resources: Identify barriers to discharge with patient and caregiver     Problem: Pain  Goal: Verbalizes/displays adequate comfort level or baseline comfort level  Outcome: Progressing  Flowsheets (Taken 7/1/2024 0011 by Cal Gallagher, RN)  Verbalizes/displays adequate comfort level or baseline comfort level:   Encourage patient to monitor pain and request assistance   Assess pain using appropriate pain scale   Administer analgesics based on type and severity of pain and evaluate response   Implement non-pharmacological measures as appropriate and evaluate response   Notify Licensed Independent Practitioner if interventions unsuccessful or patient reports new pain  Note: Pt does indicate some relief from headache with medication and non pharmacological measures.      Problem: Safety - Adult  Goal: Free from fall injury  Outcome: Progressing  Flowsheets (Taken 7/1/2024 0011 by Cal Gallagher, RN)  Free From Fall Injury: Instruct family/caregiver on patient safety     Problem: Safety - Medical Restraint  Goal: Remains free of injury from restraints (Restraint for Interference with Medical Device)  Description: INTERVENTIONS:  1. Determine that other, less restrictive measures have been tried or would not be effective before applying the restraint  2. Evaluate the patient's condition at the time of restraint application  3. Inform patient/family regarding the reason for restraint  4. Q2H: Monitor safety, psychosocial status, comfort, nutrition and hydration  Outcome: Completed  Note: No restraint present on initial assessment         Problem: Neurosensory - Adult  Goal: Achieves stable or improved neurological status  Outcome: Progressing  Flowsheets (Taken 7/1/2024 1200)  Achieves stable or improved neurological 
  Problem: Discharge Planning  Goal: Discharge to home or other facility with appropriate resources  Outcome: Progressing  Flowsheets (Taken 7/10/2024 0800 by Ghulam Royal, RN)  Discharge to home or other facility with appropriate resources: Identify barriers to discharge with patient and caregiver     Problem: Pain  Goal: Verbalizes/displays adequate comfort level or baseline comfort level  Outcome: Progressing  Flowsheets (Taken 7/8/2024 2000 by Sameera Caceres, RN)  Verbalizes/displays adequate comfort level or baseline comfort level:   Encourage patient to monitor pain and request assistance   Assess pain using appropriate pain scale   Administer analgesics based on type and severity of pain and evaluate response   Implement non-pharmacological measures as appropriate and evaluate response   Consider cultural and social influences on pain and pain management   Notify Licensed Independent Practitioner if interventions unsuccessful or patient reports new pain     Problem: Safety - Adult  Goal: Free from fall injury  Outcome: Progressing  Flowsheets (Taken 7/9/2024 2000 by Helga Jacobs, RN)  Free From Fall Injury: Instruct family/caregiver on patient safety     Problem: Chronic Conditions and Co-morbidities  Goal: Patient's chronic conditions and co-morbidity symptoms are monitored and maintained or improved  Outcome: Progressing  Flowsheets (Taken 7/10/2024 0800 by Ghulam Royal, RN)  Care Plan - Patient's Chronic Conditions and Co-Morbidity Symptoms are Monitored and Maintained or Improved: Monitor and assess patient's chronic conditions and comorbid symptoms for stability, deterioration, or improvement     
  Problem: Discharge Planning  Goal: Discharge to home or other facility with appropriate resources  Outcome: Progressing  Flowsheets (Taken 7/13/2024 2009)  Discharge to home or other facility with appropriate resources:   Identify barriers to discharge with patient and caregiver   Arrange for needed discharge resources and transportation as appropriate   Identify discharge learning needs (meds, wound care, etc)   Refer to discharge planning if patient needs post-hospital services based on physician order or complex needs related to functional status, cognitive ability or social support system     Problem: Pain  Goal: Verbalizes/displays adequate comfort level or baseline comfort level  Outcome: Progressing  Flowsheets (Taken 7/13/2024 2009)  Verbalizes/displays adequate comfort level or baseline comfort level:   Encourage patient to monitor pain and request assistance   Assess pain using appropriate pain scale   Administer analgesics based on type and severity of pain and evaluate response   Implement non-pharmacological measures as appropriate and evaluate response   Consider cultural and social influences on pain and pain management   Notify Licensed Independent Practitioner if interventions unsuccessful or patient reports new pain     Problem: Safety - Adult  Goal: Free from fall injury  Outcome: Progressing  Flowsheets (Taken 7/9/2024 2000 by Helga Jacobs RN)  Free From Fall Injury: Instruct family/caregiver on patient safety     Problem: Neurosensory - Adult  Goal: Achieves stable or improved neurological status  Outcome: Progressing  Flowsheets (Taken 7/13/2024 2009)  Achieves stable or improved neurological status:   Assess for and report changes in neurological status   Initiate measures to prevent increased intracranial pressure   Maintain blood pressure and fluid volume within ordered parameters to optimize cerebral perfusion and minimize risk of hemorrhage   Monitor temperature, glucose, and sodium. 
  Problem: Discharge Planning  Goal: Discharge to home or other facility with appropriate resources  Outcome: Progressing  Flowsheets (Taken 7/5/2024 2000)  Discharge to home or other facility with appropriate resources:   Identify barriers to discharge with patient and caregiver   Arrange for needed discharge resources and transportation as appropriate   Identify discharge learning needs (meds, wound care, etc)   Arrange for interpreters to assist at discharge as needed   Refer to discharge planning if patient needs post-hospital services based on physician order or complex needs related to functional status, cognitive ability or social support system     Problem: Pain  Goal: Verbalizes/displays adequate comfort level or baseline comfort level  Outcome: Progressing  Flowsheets (Taken 7/5/2024 1700 by Stevie Abdullahi, RN)  Verbalizes/displays adequate comfort level or baseline comfort level:   Encourage patient to monitor pain and request assistance   Assess pain using appropriate pain scale   Administer analgesics based on type and severity of pain and evaluate response     Problem: Safety - Adult  Goal: Free from fall injury  Outcome: Progressing  Flowsheets (Taken 7/5/2024 2000)  Free From Fall Injury:   Instruct family/caregiver on patient safety   Based on caregiver fall risk screen, instruct family/caregiver to ask for assistance with transferring infant if caregiver noted to have fall risk factors     Problem: Neurosensory - Adult  Goal: Achieves stable or improved neurological status  Outcome: Progressing  Flowsheets (Taken 7/5/2024 2000)  Achieves stable or improved neurological status:   Assess for and report changes in neurological status   Initiate measures to prevent increased intracranial pressure   Maintain blood pressure and fluid volume within ordered parameters to optimize cerebral perfusion and minimize risk of hemorrhage   Monitor temperature, glucose, and sodium. Initiate appropriate interventions 
  Problem: Discharge Planning  Goal: Discharge to home or other facility with appropriate resources  Outcome: Progressing  Flowsheets (Taken 7/7/2024 2000)  Discharge to home or other facility with appropriate resources: Identify barriers to discharge with patient and caregiver     Problem: Pain  Goal: Verbalizes/displays adequate comfort level or baseline comfort level  Outcome: Progressing     Problem: Safety - Adult  Goal: Free from fall injury  Outcome: Progressing  Flowsheets (Taken 7/7/2024 2000)  Free From Fall Injury: Instruct family/caregiver on patient safety     Problem: Neurosensory - Adult  Goal: Achieves stable or improved neurological status  Outcome: Progressing  Goal: Absence of seizures  Outcome: Progressing  Goal: Achieves maximal functionality and self care  Outcome: Progressing     Problem: Hematologic - Adult  Goal: Maintains hematologic stability  Outcome: Progressing  Flowsheets (Taken 7/7/2024 2000)  Maintains hematologic stability:   Assess for signs and symptoms of bleeding or hemorrhage   Monitor labs for bleeding or clotting disorders   Administer blood products/factors as ordered     Problem: Skin/Tissue Integrity  Goal: Absence of new skin breakdown  Description: 1.  Monitor for areas of redness and/or skin breakdown  2.  Assess vascular access sites hourly  3.  Every 4-6 hours minimum:  Change oxygen saturation probe site  4.  Every 4-6 hours:  If on nasal continuous positive airway pressure, respiratory therapy assess nares and determine need for appliance change or resting period.  Outcome: Progressing     Problem: ABCDS Injury Assessment  Goal: Absence of physical injury  Outcome: Progressing  Flowsheets (Taken 7/7/2024 2000)  Absence of Physical Injury: Implement safety measures based on patient assessment     Problem: Respiratory - Adult  Goal: Achieves optimal ventilation and oxygenation  Outcome: Progressing     Problem: Cardiovascular - Adult  Goal: Maintains optimal cardiac 
  Problem: Discharge Planning  Goal: Discharge to home or other facility with appropriate resources  Outcome: Progressing  Flowsheets (Taken 7/9/2024 2000)  Discharge to home or other facility with appropriate resources: Identify barriers to discharge with patient and caregiver     Problem: Pain  Goal: Verbalizes/displays adequate comfort level or baseline comfort level  Outcome: Progressing     Problem: Safety - Adult  Goal: Free from fall injury  Outcome: Progressing  Flowsheets (Taken 7/9/2024 2000)  Free From Fall Injury: Instruct family/caregiver on patient safety     Problem: Neurosensory - Adult  Goal: Achieves stable or improved neurological status  Outcome: Progressing  Flowsheets (Taken 7/9/2024 2000)  Achieves stable or improved neurological status:   Assess for and report changes in neurological status   Initiate measures to prevent increased intracranial pressure   Maintain blood pressure and fluid volume within ordered parameters to optimize cerebral perfusion and minimize risk of hemorrhage   Monitor temperature, glucose, and sodium. Initiate appropriate interventions as ordered  Goal: Absence of seizures  Outcome: Progressing  Flowsheets (Taken 7/9/2024 2000)  Absence of seizures:   Monitor for seizure activity.  If seizure occurs, document type and location of movements and any associated apnea   If seizure occurs, turn head to side and suction secretions as needed  Goal: Achieves maximal functionality and self care  Outcome: Progressing  Flowsheets (Taken 7/9/2024 2000)  Achieves maximal functionality and self care:   Monitor swallowing and airway patency with patient fatigue and changes in neurological status   Encourage and assist patient to increase activity and self care with guidance from physical therapy/occupational therapy   Encourage visually impaired, hearing impaired and aphasic patients to use assistive/communication devices     Problem: Hematologic - Adult  Goal: Maintains hematologic 
  Problem: Neurosensory - Adult  Goal: Absence of seizures  7/14/2024 0557 by Rosanna Gonzales, RN  Outcome: Progressing  Flowsheets (Taken 7/13/2024 2009)  Absence of seizures:   Monitor for seizure activity.  If seizure occurs, document type and location of movements and any associated apnea   Administer anticonvulsants as ordered   If seizure occurs, turn head to side and suction secretions as needed   Support airway/breathing, administer oxygen as needed   Diagnostic studies as ordered  Goal: Achieves maximal functionality and self care  7/14/2024 0557 by Rosanna Gonzales, RN  Outcome: Progressing  Flowsheets (Taken 7/13/2024 2009)  Achieves maximal functionality and self care:   Monitor swallowing and airway patency with patient fatigue and changes in neurological status   Encourage and assist patient to increase activity and self care with guidance from physical therapy/occupational therapy   Encourage visually impaired, hearing impaired and aphasic patients to use assistive/communication devices     
Neurology following peripherally today.  Please call with any neurological symptoms/concerns. Please reach out with any questions or concerns.      This case was discussed with Dr. Weir and he is in agreement with the plan.    Electronically signed by TERRI Maya CNP on 7/7/24 at 12:46 PM EDT    
Neurology following peripherally today.  Please call with any neurological symptoms/concerns. Please reach out with any questions or concerns.    This case was discussed with Dr. Weir and he is in agreement with the plan.    Electronically signed by TERRI Maya CNP on 7/6/24 at 2:07 PM EDT      
hematologic stability  Outcome: Progressing  Flowsheets (Taken 7/5/2024 0152)  Maintains hematologic stability:   Assess for signs and symptoms of bleeding or hemorrhage   Administer blood products/factors as ordered   Monitor labs for bleeding or clotting disorders     Problem: Skin/Tissue Integrity  Goal: Absence of new skin breakdown  Description: 1.  Monitor for areas of redness and/or skin breakdown  2.  Assess vascular access sites hourly  3.  Every 4-6 hours minimum:  Change oxygen saturation probe site  4.  Every 4-6 hours:  If on nasal continuous positive airway pressure, respiratory therapy assess nares and determine need for appliance change or resting period.  Outcome: Progressing     Problem: ABCDS Injury Assessment  Goal: Absence of physical injury  Outcome: Progressing  Flowsheets (Taken 7/5/2024 0152)  Absence of Physical Injury: Implement safety measures based on patient assessment     Problem: Respiratory - Adult  Goal: Achieves optimal ventilation and oxygenation  Outcome: Progressing  Flowsheets (Taken 7/5/2024 0152)  Achieves optimal ventilation and oxygenation:   Assess for changes in respiratory status   Position to facilitate oxygenation and minimize respiratory effort   Initiate smoking cessation protocol as indicated   Assess the need for suctioning and aspirate as needed   Respiratory therapy support as indicated   Assess for changes in mentation and behavior   Oxygen supplementation based on oxygen saturation or arterial blood gases   Encourage broncho-pulmonary hygiene including cough, deep breathe, incentive spirometry   Assess and instruct to report shortness of breath or any respiratory difficulty     Problem: Cardiovascular - Adult  Goal: Maintains optimal cardiac output and hemodynamic stability  Outcome: Progressing  Flowsheets (Taken 7/5/2024 0152)  Maintains optimal cardiac output and hemodynamic stability:   Monitor blood pressure and heart rate   Assess for signs of decreased 
ordered parameters to optimize cerebral perfusion and minimize risk of hemorrhage   Monitor temperature, glucose, and sodium. Initiate appropriate interventions as ordered     Problem: Neurosensory - Adult  Goal: Absence of seizures  Outcome: Progressing  Flowsheets (Taken 7/3/2024 1031)  Absence of seizures:   Monitor for seizure activity.  If seizure occurs, document type and location of movements and any associated apnea   If seizure occurs, turn head to side and suction secretions as needed   Administer anticonvulsants as ordered   Support airway/breathing, administer oxygen as needed     Problem: Neurosensory - Adult  Goal: Achieves maximal functionality and self care  Flowsheets (Taken 7/3/2024 1031)  Achieves maximal functionality and self care:   Monitor swallowing and airway patency with patient fatigue and changes in neurological status   Encourage and assist patient to increase activity and self care with guidance from physical therapy/occupational therapy   Encourage visually impaired, hearing impaired and aphasic patients to use assistive/communication devices     Problem: Hematologic - Adult  Goal: Maintains hematologic stability  Outcome: Progressing  Flowsheets (Taken 7/3/2024 1031)  Maintains hematologic stability:   Assess for signs and symptoms of bleeding or hemorrhage   Monitor labs for bleeding or clotting disorders   Administer blood products/factors as ordered     Problem: Respiratory - Adult  Goal: Achieves optimal ventilation and oxygenation  Flowsheets (Taken 7/3/2024 1031)  Achieves optimal ventilation and oxygenation:   Assess for changes in respiratory status   Assess for changes in mentation and behavior   Position to facilitate oxygenation and minimize respiratory effort   Oxygen supplementation based on oxygen saturation or arterial blood gases   Encourage broncho-pulmonary hygiene including cough, deep breathe, incentive spirometry   Assess the need for suctioning and aspirate as 
Progressing  Flowsheets (Taken 7/8/2024 2000)  Achieves optimal ventilation and oxygenation: Assess for changes in respiratory status  7/8/2024 0928   Outcome: Progressing  Flowsheets (Taken 7/8/2024 0928)  Achieves optimal ventilation and oxygenation: Assess for changes in respiratory status     Problem: Cardiovascular - Adult  Goal: Maintains optimal cardiac output and hemodynamic stability  7/8/2024 2135 by Sameera Caceres RN  Outcome: Progressing  Flowsheets (Taken 7/8/2024 2000)  Maintains optimal cardiac output and hemodynamic stability:   Monitor blood pressure and heart rate   Assess for signs of decreased cardiac output  7/8/2024 0928  Outcome: Progressing  Flowsheets (Taken 7/8/2024 0928)  Maintains optimal cardiac output and hemodynamic stability:   Monitor blood pressure and heart rate   Assess for signs of decreased cardiac output  Problem: Genitourinary - Adult  Goal: Absence of urinary retention  7/8/2024 2135 by Sameera Caceres RN  Outcome: Progressing  Flowsheets (Taken 7/8/2024 2000)  Absence of urinary retention: Assess patient’s ability to void and empty bladder  7/8/2024 0928   Outcome: Progressing  Flowsheets  Taken 7/8/2024 0928  Absence of urinary retention: Assess patient’s ability to void and empty bladder  Taken 7/8/2024 0800  Absence of urinary retention:   Monitor intake/output and perform bladder scan as needed   Assess patient’s ability to void and empty bladder  Note: External catheter in use at this time.      Problem: Chronic Conditions and Co-morbidities  Goal: Patient's chronic conditions and co-morbidity symptoms are monitored and maintained or improved  7/8/2024 2135 by Sameera Caceres RN  Outcome: Progressing  Flowsheets (Taken 7/8/2024 2000)  Care Plan - Patient's Chronic Conditions and Co-Morbidity Symptoms are Monitored and Maintained or Improved:   Monitor and assess patient's chronic conditions and comorbid symptoms for stability, deterioration, or improvement

## 2024-07-15 NOTE — CARE COORDINATION
7/15/24, 7:07 AM EDT    Late entry  Patient goals/plan/ treatment preferences discussed by  and .  Patient goals/plan/ treatment preferences reviewed with patient/ family.  Patient/ family verbalize understanding of discharge plan and are in agreement with goal/plan/treatment preferences.  Understanding was demonstrated using the teach back method.  AVS provided by RN at time of discharge, which includes all necessary medical information pertaining to the patients current course of illness, treatment, post-discharge goals of care, and treatment preferences.     Services At/After Discharge: None

## 2024-07-18 DIAGNOSIS — I60.7 RUPTURED CEREBRAL ANEURYSM (HCC): Primary | ICD-10-CM

## 2024-07-18 RX ORDER — NIMODIPINE 30 MG/1
60 CAPSULE, LIQUID FILLED ORAL EVERY 4 HOURS
Qty: 48 CAPSULE | Refills: 0 | Status: SHIPPED | OUTPATIENT
Start: 2024-07-18 | End: 2024-07-22

## 2024-09-17 ENCOUNTER — OFFICE VISIT (OUTPATIENT)
Dept: NEUROLOGY | Age: 49
End: 2024-09-17
Payer: COMMERCIAL

## 2024-09-17 ENCOUNTER — TELEPHONE (OUTPATIENT)
Dept: NEUROLOGY | Age: 49
End: 2024-09-17

## 2024-09-17 VITALS — BODY MASS INDEX: 36.36 KG/M2 | DIASTOLIC BLOOD PRESSURE: 76 MMHG | SYSTOLIC BLOOD PRESSURE: 134 MMHG | WEIGHT: 180 LBS

## 2024-09-17 DIAGNOSIS — I60.7 RUPTURED CEREBRAL ANEURYSM (HCC): Primary | ICD-10-CM

## 2024-09-17 PROCEDURE — 4004F PT TOBACCO SCREEN RCVD TLK: CPT | Performed by: NURSE PRACTITIONER

## 2024-09-17 PROCEDURE — 3017F COLORECTAL CA SCREEN DOC REV: CPT | Performed by: NURSE PRACTITIONER

## 2024-09-17 PROCEDURE — 3078F DIAST BP <80 MM HG: CPT | Performed by: NURSE PRACTITIONER

## 2024-09-17 PROCEDURE — G8417 CALC BMI ABV UP PARAM F/U: HCPCS | Performed by: NURSE PRACTITIONER

## 2024-09-17 PROCEDURE — G8427 DOCREV CUR MEDS BY ELIG CLIN: HCPCS | Performed by: NURSE PRACTITIONER

## 2024-09-17 PROCEDURE — 99214 OFFICE O/P EST MOD 30 MIN: CPT | Performed by: NURSE PRACTITIONER

## 2024-09-17 PROCEDURE — 3075F SYST BP GE 130 - 139MM HG: CPT | Performed by: NURSE PRACTITIONER

## 2024-09-17 RX ORDER — ESCITALOPRAM OXALATE 5 MG/1
1 TABLET ORAL DAILY
COMMUNITY

## 2024-09-17 RX ORDER — FLUTICASONE PROPIONATE 50 MCG
SPRAY, SUSPENSION (ML) NASAL
COMMUNITY

## 2024-09-17 ASSESSMENT — ENCOUNTER SYMPTOMS: PHOTOPHOBIA: 0

## 2024-10-22 ENCOUNTER — TELEPHONE (OUTPATIENT)
Dept: NEUROLOGY | Age: 49
End: 2024-10-22

## 2024-10-22 NOTE — TELEPHONE ENCOUNTER
Hello,    This patient is scheduled for a cerebral angiogram with no anesthesia on October 29th with Dr. Nye.    NIKHIL called and stated the following:    Patient is no longer taking any blood pressure medications.     She is taking methadone, is she okay to take this the morning of the procedure or does it need to be help for a certain length of time?    Thanks,  Mary

## 2024-10-23 NOTE — TELEPHONE ENCOUNTER
Patient notified that per Desean Godinez, not need to hold methadone prior to procedure. Patient verbalized understanding.

## 2024-10-28 ENCOUNTER — TELEPHONE (OUTPATIENT)
Dept: NEUROLOGY | Age: 49
End: 2024-10-28

## 2024-10-28 NOTE — TELEPHONE ENCOUNTER
Spoke with patient to confirm procedure for tomorrow, 10/29 with an arrival of 8am. Patient verbalized understanding.

## 2024-10-29 ENCOUNTER — HOSPITAL ENCOUNTER (OUTPATIENT)
Dept: INTERVENTIONAL RADIOLOGY/VASCULAR | Age: 49
Discharge: HOME | End: 2024-10-29
Attending: PSYCHIATRY & NEUROLOGY | Admitting: PSYCHIATRY & NEUROLOGY
Payer: COMMERCIAL

## 2024-10-29 VITALS
OXYGEN SATURATION: 96 % | RESPIRATION RATE: 17 BRPM | DIASTOLIC BLOOD PRESSURE: 72 MMHG | TEMPERATURE: 98.2 F | HEART RATE: 65 BPM | BODY MASS INDEX: 42 KG/M2 | HEIGHT: 59 IN | WEIGHT: 208.34 LBS | SYSTOLIC BLOOD PRESSURE: 156 MMHG

## 2024-10-29 DIAGNOSIS — I60.7 RUPTURED CEREBRAL ANEURYSM (HCC): Primary | ICD-10-CM

## 2024-10-29 DIAGNOSIS — I72.9 ANEURYSM (HCC): ICD-10-CM

## 2024-10-29 LAB
ABO: NORMAL
ANION GAP SERPL CALC-SCNC: 12 MEQ/L (ref 8–16)
ANTIBODY SCREEN: NORMAL
APTT PPP: 31.4 SECONDS (ref 22–38)
B-HCG SERPL QL: NEGATIVE
BUN SERPL-MCNC: 15 MG/DL (ref 7–22)
CALCIUM SERPL-MCNC: 8.7 MG/DL (ref 8.5–10.5)
CHLORIDE SERPL-SCNC: 103 MEQ/L (ref 98–111)
CO2 SERPL-SCNC: 24 MEQ/L (ref 23–33)
CREAT SERPL-MCNC: 1.1 MG/DL (ref 0.4–1.2)
DEPRECATED RDW RBC AUTO: 44.7 FL (ref 35–45)
EKG ATRIAL RATE: 56 BPM
EKG P AXIS: 62 DEGREES
EKG P-R INTERVAL: 146 MS
EKG Q-T INTERVAL: 478 MS
EKG QRS DURATION: 100 MS
EKG QTC CALCULATION (BAZETT): 461 MS
EKG R AXIS: 25 DEGREES
EKG T AXIS: 37 DEGREES
EKG VENTRICULAR RATE: 56 BPM
ERYTHROCYTE [DISTWIDTH] IN BLOOD BY AUTOMATED COUNT: 13.9 % (ref 11.5–14.5)
GFR SERPL CREATININE-BSD FRML MDRD: 61 ML/MIN/1.73M2
GLUCOSE SERPL-MCNC: 84 MG/DL (ref 70–108)
HCT VFR BLD AUTO: 40.3 % (ref 37–47)
HGB BLD-MCNC: 12.7 GM/DL (ref 12–16)
INR PPP: 1.09 (ref 0.85–1.13)
MCH RBC QN AUTO: 27.6 PG (ref 26–33)
MCHC RBC AUTO-ENTMCNC: 31.5 GM/DL (ref 32.2–35.5)
MCV RBC AUTO: 87.6 FL (ref 81–99)
PLATELET # BLD AUTO: 243 THOU/MM3 (ref 130–400)
PMV BLD AUTO: 10 FL (ref 9.4–12.4)
POTASSIUM SERPL-SCNC: 4.2 MEQ/L (ref 3.5–5.2)
RBC # BLD AUTO: 4.6 MILL/MM3 (ref 4.2–5.4)
RH FACTOR: NORMAL
SODIUM SERPL-SCNC: 139 MEQ/L (ref 135–145)
WBC # BLD AUTO: 9.6 THOU/MM3 (ref 4.8–10.8)

## 2024-10-29 PROCEDURE — 85610 PROTHROMBIN TIME: CPT

## 2024-10-29 PROCEDURE — 2580000003 HC RX 258

## 2024-10-29 PROCEDURE — 85027 COMPLETE CBC AUTOMATED: CPT

## 2024-10-29 PROCEDURE — 86900 BLOOD TYPING SEROLOGIC ABO: CPT

## 2024-10-29 PROCEDURE — 84703 CHORIONIC GONADOTROPIN ASSAY: CPT

## 2024-10-29 PROCEDURE — 86850 RBC ANTIBODY SCREEN: CPT

## 2024-10-29 PROCEDURE — 93005 ELECTROCARDIOGRAM TRACING: CPT

## 2024-10-29 PROCEDURE — 85730 THROMBOPLASTIN TIME PARTIAL: CPT

## 2024-10-29 PROCEDURE — 7100000010 HC PHASE II RECOVERY - FIRST 15 MIN: Performed by: PSYCHIATRY & NEUROLOGY

## 2024-10-29 PROCEDURE — 86901 BLOOD TYPING SEROLOGIC RH(D): CPT

## 2024-10-29 PROCEDURE — 2500000003 HC RX 250 WO HCPCS: Performed by: PSYCHIATRY & NEUROLOGY

## 2024-10-29 PROCEDURE — 80048 BASIC METABOLIC PNL TOTAL CA: CPT

## 2024-10-29 PROCEDURE — 6360000004 HC RX CONTRAST MEDICATION: Performed by: PSYCHIATRY & NEUROLOGY

## 2024-10-29 PROCEDURE — 6360000002 HC RX W HCPCS: Performed by: PSYCHIATRY & NEUROLOGY

## 2024-10-29 PROCEDURE — 7100000011 HC PHASE II RECOVERY - ADDTL 15 MIN: Performed by: PSYCHIATRY & NEUROLOGY

## 2024-10-29 PROCEDURE — 36226 PLACE CATH VERTEBRAL ART: CPT

## 2024-10-29 PROCEDURE — 36415 COLL VENOUS BLD VENIPUNCTURE: CPT

## 2024-10-29 PROCEDURE — 36224 PLACE CATH CAROTD ART: CPT

## 2024-10-29 RX ORDER — SODIUM CHLORIDE 0.9 % (FLUSH) 0.9 %
5-40 SYRINGE (ML) INJECTION PRN
Status: DISCONTINUED | OUTPATIENT
Start: 2024-10-29 | End: 2024-10-29 | Stop reason: HOSPADM

## 2024-10-29 RX ORDER — ACETAMINOPHEN 325 MG/1
650 TABLET ORAL EVERY 4 HOURS PRN
Status: DISCONTINUED | OUTPATIENT
Start: 2024-10-29 | End: 2024-10-29 | Stop reason: HOSPADM

## 2024-10-29 RX ORDER — MIDAZOLAM HYDROCHLORIDE 1 MG/ML
INJECTION, SOLUTION INTRAMUSCULAR; INTRAVENOUS PRN
Status: COMPLETED | OUTPATIENT
Start: 2024-10-29 | End: 2024-10-29

## 2024-10-29 RX ORDER — SODIUM CHLORIDE 9 MG/ML
INJECTION, SOLUTION INTRAVENOUS PRN
Status: DISCONTINUED | OUTPATIENT
Start: 2024-10-29 | End: 2024-10-29 | Stop reason: HOSPADM

## 2024-10-29 RX ORDER — LIDOCAINE HYDROCHLORIDE 20 MG/ML
INJECTION, SOLUTION EPIDURAL; INFILTRATION; INTRACAUDAL; PERINEURAL PRN
Status: COMPLETED | OUTPATIENT
Start: 2024-10-29 | End: 2024-10-29

## 2024-10-29 RX ORDER — FENTANYL CITRATE 50 UG/ML
INJECTION, SOLUTION INTRAMUSCULAR; INTRAVENOUS PRN
Status: COMPLETED | OUTPATIENT
Start: 2024-10-29 | End: 2024-10-29

## 2024-10-29 RX ORDER — SODIUM CHLORIDE 0.9 % (FLUSH) 0.9 %
5-40 SYRINGE (ML) INJECTION EVERY 12 HOURS SCHEDULED
Status: DISCONTINUED | OUTPATIENT
Start: 2024-10-29 | End: 2024-10-29 | Stop reason: HOSPADM

## 2024-10-29 RX ADMIN — MIDAZOLAM 1 MG: 1 INJECTION INTRAMUSCULAR; INTRAVENOUS at 10:33

## 2024-10-29 RX ADMIN — IOHEXOL 40 ML: 300 INJECTION, SOLUTION INTRAVENOUS at 11:07

## 2024-10-29 RX ADMIN — LIDOCAINE HYDROCHLORIDE 10 ML: 20 INJECTION, SOLUTION EPIDURAL; INFILTRATION; INTRACAUDAL; PERINEURAL at 10:39

## 2024-10-29 RX ADMIN — SODIUM CHLORIDE: 9 INJECTION, SOLUTION INTRAVENOUS at 08:31

## 2024-10-29 RX ADMIN — FENTANYL CITRATE 25 MCG: 50 INJECTION, SOLUTION INTRAMUSCULAR; INTRAVENOUS at 10:33

## 2024-10-29 ASSESSMENT — ENCOUNTER SYMPTOMS
SORE THROAT: 0
RHINORRHEA: 0
ABDOMINAL PAIN: 0
SHORTNESS OF BREATH: 1
COUGH: 0
VOMITING: 0
NAUSEA: 0

## 2024-10-29 NOTE — FLOWSHEET NOTE
Iv catheter removed. Telemetry off. Right groin stable. Questions addressed. Family present. Dressing for discharge to home post cerebral angiogram.

## 2024-10-29 NOTE — PROGRESS NOTES
1023 Verify consent, H&P, and/or immediate pre-procedure note completed  1023 Staff involved in the case, Dr. Nye, SK, JL, MM, CS, MJ  1023 Patient received in cath lab for procedure family taken to waiting room. Neuro assessment WNL  (Neuro assessments suspended while in procedure, vital signs, and pulse oximeter every 5 minutes documented in flow sheets  1025 Pre-procedure peripheral pulse,  right radial 2, left radial 2  1026 Patient prepped for procedure  1037 Procedure started with Dr. Nye.Timeout performed and the following information was verified: correct patient, correct procedure, correct procedure site, correct position, correct laterality (if applicable), procedure site marked and visible (if applicable), and consents verified.  Allergies reviewed.  Pertinent diagnostic and radiologic results present and relevant images available (if applicable).  All special equipment or special requirements available as applicable. Prophylactic antibiotics given as applicable.  Fire risk safety assessment completed, shared with team and appropriate interventions implemented.  1039 Lidocaine time  1039 Medications administered and dose see MAR  1042 Access obtained at right femoral via sonosite  1048 Angiogram of right internal carotid. Multiple images obtained.  1051 Angiogram of right vertebral.  1052 Angiogram of Left internal common.  1054 Procedure completed; patient tolerated well.  1056 Sheath-removed, intact, manual pressure held  1105 Manual pressure released  1106 Right Femoral site; area soft to touch with no bleeding noted. Hemostasis achieved.  1106 Post-procedure peripheral pulse,  right pedal 2, right posterior tibial 2, left pedal 2, left posterior tibial 2  1106 Femoral dressing remains dry and intact with area soft. Neuro assessment remains the same.  Last set of vitals with O2 95 Martin Score10  1106 Patient on bed, patient exits procedural suite  1108 Case end time  1108 Patient take to 2E,

## 2024-10-29 NOTE — PLAN OF CARE
Patient underwent diagnostic cerebral angiogram today with Dr. Nye.  Coiled aneurysm looks good.  Patient will require 6 hours of bedrest.  Recommend a systolic blood pressure of .  We will have patient follow-up in the interventional neurology clinic in 1 year with an MRA head prior to visit.  Our office will call patient to schedule appointment.  Patient okay to be discharged once bedrest is up, patient has tolerated an oral diet, patient does use the restroom, and ambulated.    This was discussed with Dr. Nye and he is in agreement with the assessment and plan.    Electronically signed by YOVANNY Harvey on 10/29/24 at 5:57 PM EDT

## 2024-10-29 NOTE — PROGRESS NOTES
Returned to 2E05.  Monitor attached showing SR.  Dressing to right groin dry and intact.  No bleeding, swelling, or edema noted.  Hemostasis maintained.  0.9NSS infusing with approx 600 ml remaining.

## 2024-10-29 NOTE — PROGRESS NOTES
Patient admitted to [facility]  Ambulatory for cerebral angiogram.  Patient NPO. Patient accompanied by family.  Vital signs obtained.   Assessment and data collection intiated.   Oriented to room.  Policies and procedures for  explained.   All questions answered with no further questions at this time.   Fall prevention and safety precautions discussed with patient.

## 2024-10-29 NOTE — BRIEF OP NOTE
Brief Postoperative Note      Patient: Marika Peters  YOB: 1975  MRN: [id number]    Date of Procedure: 10/29/2024    Pre op Diagnosis: Ruptured coiled ACOM aneurysm.    Post-Op Diagnosis: Same. Still near completley occluded aneurysm.       Procedure: Cerebral angiogram.    Surgeon(s):  Denise Nye MD    Assistant:  None.    Anesthesia: Moderate and local.    Estimated Blood Loss (mL): < 10 ml    Complications: None    Specimens:   None.    Implants:  None.    Drains: None.    Findings:  As above      Electronically signed by Denise Nye MD on 10/29/2024 at 10:56 AM

## 2024-10-29 NOTE — OP NOTE
DATE OF PROCEDURE: 10/29/24.    PREOPERATIVE DIAGNOSIS: ruptured acom aneurysm.     POSTOPERATIVE DIAGNOSIS: stable near occluded aneurysm.    SURGEON: Denise Nye MD.    ANESTHESIA: Moderate and local.    MEDICATIONS: 1mg versed and 25 mcg fentanyl.     CONTRAST: 40 ml iso bernice.    FLUORO TIME: 3.2 minutes.    Estimated Blood Loss: < 10 ml.     INTERVENTIONAL PROCEDURE:  3-VESSEL CEREBRAL ANGIOGRAM.   US-GUIDED ARTERIAL ACCESS    CATHETERIZATION OF THE FOLLOWING VESSEL:  RIGHT COMMON CAROTID ARTERY.  RIGHT INTERNAL CAROTID ARTERY.  RIGHT SUBCLAVIAN ARTERY.  RIGHT VERTEBRAL ARTERY.  LEFT COMMON CAROTID ARTERY.  LEFT INTERNAL CAROTID ARTERY.     ANGIOGRAPHY AND INTERPRETATION OF THE FOLLOWING IMAGES  RIGHT COMMON CAROTID ARTERY CERVICAL STANDARD AP AND LATERAL ANGIOGRAM.  RIGHT INTERNAL CAROTID ARTERY CRANIAL STANDARD AP AND LATERAL ANGIOGRAM.   RIGHT INTERNAL CAROTID ARTERY CRANIAL OBLIQUE AP AND LATERAL ANGIOGRAM.   RIGHT SUBCLAVIAN ARTERY CERVICAL AP ANGIOGRAM.  RIGHT VERTEBRAL ARTERY STANDARD CRANIAL AP AND LATERAL ANGIOGRAM.  LEFT COMMON CAROTID ARTERY CERVICAL STANDARD AP AND LATERAL ANGIOGRAM.  LEFT INTERNAL CAROTID ARTERY CRANIAL STANDARD AP AND LATERAL ANGIOGRAM.        INDICATION:     49-year-old lady with ruptured coiled anterior communicating artery aneurysm. She is here for diagnostic cerebral angiogram to assess aneurysm occlusion. Consent was obtained from the patient after explaining benefits and risks. All questions were answered.      DESCRIPTION OF PROCEDURE AND FINDINGS:     The patient was brought into the angiography suite. Bilateral groins were sterilely prepped and draped in the usual fashion. I administered moderate sedation throughout this 21-minute procedure. An independent trained observer pushed medications at my direction, and monitored the patient’s level of consciousness and physiological status throughout. Because of inability to feel the femoral artery pulse well, the decision was

## 2024-10-29 NOTE — H&P
occlusion with cerebral infarction (HCC), and Hypertension.    Social History:   reports that she has been smoking cigarettes. She started smoking about 34 years ago. She has a 38.7 pack-year smoking history. She has never used smokeless tobacco. She reports that she does not currently use alcohol. She reports current drug use. Drug: Marijuana (Weed).     Family History: History reviewed. No pertinent family history.    Physical Examination      Vitals:  BP (!) 120/58   Pulse 61   Temp 98.2 °F (36.8 °C) (Oral)   Resp 18   Ht 1.499 m (4' 11\")   Wt 94.5 kg (208 lb 5.4 oz)   SpO2 96%   Breastfeeding No   BMI 42.08 kg/m²   Temp (24hrs), Av.2 °F (36.8 °C), Min:98.2 °F (36.8 °C), Max:98.2 °F (36.8 °C)      I/O (24Hr):  No intake or output data in the 24 hours ending 10/29/24 0830      Physical Exam  Vitals reviewed.   Constitutional:       General: She is not in acute distress.     Appearance: Normal appearance. She is not ill-appearing.   HENT:      Head: Normocephalic and atraumatic.      Right Ear: External ear normal.      Left Ear: External ear normal.      Nose: Nose normal.      Mouth/Throat:      Mouth: Mucous membranes are moist.      Pharynx: No oropharyngeal exudate or posterior oropharyngeal erythema.   Eyes:      Extraocular Movements: EOM normal.      Pupils: Pupils are equal, round, and reactive to light.   Cardiovascular:      Rate and Rhythm: Normal rate.      Pulses:           Dorsalis pedis pulses are 2+ on the right side and 2+ on the left side.        Posterior tibial pulses are 2+ on the right side and 2+ on the left side.      Heart sounds: Normal heart sounds.   Pulmonary:      Effort: Pulmonary effort is normal.   Abdominal:      Palpations: Abdomen is soft.      Tenderness: There is no abdominal tenderness.   Musculoskeletal:         General: Normal range of motion.      Right lower leg: No edema.      Left lower leg: No edema.   Skin:     General: Skin is warm.      Findings: No  no

## 2024-10-29 NOTE — DISCHARGE INSTRUCTIONS
Reviewed with the patient post cerebral angiogram instructions.  Discussed care of puncture site:  remove dressing in 24 hours, gently clean site with soap and water, pat dry, and apply bandaid daily for 5 days.  Keep site clean and dry.  Do not apply any creams, lotions, or powders to puncture site.  Do not submerse site in water (no tub baths, swimming, or whirlpool) for 5 days.  Take it easy for the next 3-5 days.  No driving for 3 days.  Avoid heaving lifting, pushing, pulling or straining.  Monitor site for bleeding, drainage, or swelling.  Monitor for signs of infection which include:  redness, drainage, soreness, or temp greater than 101 F.  Notify doctor of any abnormal findings as listed.  If bleeding occurs, hold firm pressure at site and call 911.

## 2025-08-22 ENCOUNTER — TRANSCRIBE ORDERS (OUTPATIENT)
Dept: ADMINISTRATIVE | Age: 50
End: 2025-08-22

## 2025-08-22 DIAGNOSIS — Z12.31 ENCOUNTER FOR SCREENING MAMMOGRAM FOR MALIGNANT NEOPLASM OF BREAST: Primary | ICD-10-CM
